# Patient Record
Sex: FEMALE | Race: WHITE | NOT HISPANIC OR LATINO | Employment: OTHER | URBAN - METROPOLITAN AREA
[De-identification: names, ages, dates, MRNs, and addresses within clinical notes are randomized per-mention and may not be internally consistent; named-entity substitution may affect disease eponyms.]

---

## 2019-03-20 ENCOUNTER — HOSPITAL ENCOUNTER (EMERGENCY)
Facility: HOSPITAL | Age: 79
Discharge: HOME | End: 2019-03-20
Attending: EMERGENCY MEDICINE
Payer: MEDICARE

## 2019-03-20 VITALS
OXYGEN SATURATION: 99 % | BODY MASS INDEX: 21.34 KG/M2 | HEART RATE: 74 BPM | RESPIRATION RATE: 20 BRPM | WEIGHT: 125 LBS | TEMPERATURE: 98 F | SYSTOLIC BLOOD PRESSURE: 188 MMHG | HEIGHT: 64 IN | DIASTOLIC BLOOD PRESSURE: 104 MMHG

## 2019-03-20 DIAGNOSIS — S52.532A CLOSED COLLES' FRACTURE OF LEFT RADIUS, INITIAL ENCOUNTER: Primary | ICD-10-CM

## 2019-03-20 DIAGNOSIS — R52 PAIN: ICD-10-CM

## 2019-03-20 PROCEDURE — 29105 APPLICATION LONG ARM SPLINT: CPT | Mod: LT

## 2019-03-20 PROCEDURE — 73110 XR WRIST COMPLETE 3 VIEWS LEFT: ICD-10-PCS | Mod: 26,LT,, | Performed by: RADIOLOGY

## 2019-03-20 PROCEDURE — 99283 EMERGENCY DEPT VISIT LOW MDM: CPT | Mod: 25

## 2019-03-20 PROCEDURE — 73110 X-RAY EXAM OF WRIST: CPT | Mod: TC,FY,LT

## 2019-03-20 PROCEDURE — 29125 APPL SHORT ARM SPLINT STATIC: CPT

## 2019-03-20 PROCEDURE — 73110 X-RAY EXAM OF WRIST: CPT | Mod: 26,LT,, | Performed by: RADIOLOGY

## 2019-03-20 NOTE — DISCHARGE INSTRUCTIONS
Splint care   Follow up Orthopedics   Dr Russo will evaluate again for potential need of reduction

## 2019-03-22 ENCOUNTER — OFFICE VISIT (OUTPATIENT)
Dept: ORTHOPEDICS | Facility: CLINIC | Age: 79
End: 2019-03-22
Payer: MEDICARE

## 2019-03-22 VITALS — RESPIRATION RATE: 18 BRPM | BODY MASS INDEX: 21.34 KG/M2 | WEIGHT: 125 LBS | HEIGHT: 64 IN

## 2019-03-22 DIAGNOSIS — M19.032 ARTHRITIS OF WRIST, LEFT: ICD-10-CM

## 2019-03-22 DIAGNOSIS — M19.032 CMC DJD(CARPOMETACARPAL DEGENERATIVE JOINT DISEASE), LOCALIZED PRIMARY, LEFT: ICD-10-CM

## 2019-03-22 DIAGNOSIS — S52.602A TRAUMATIC CLOSED DISPLACED FRACTURE OF DISTAL END OF RADIUS AND ULNA, LEFT, INITIAL ENCOUNTER: ICD-10-CM

## 2019-03-22 DIAGNOSIS — S52.502A TRAUMATIC CLOSED DISPLACED FRACTURE OF DISTAL END OF RADIUS AND ULNA, LEFT, INITIAL ENCOUNTER: ICD-10-CM

## 2019-03-22 PROCEDURE — 29075 APPL CST ELBW FNGR SHORT ARM: CPT | Mod: PBBFAC,PN | Performed by: ORTHOPAEDIC SURGERY

## 2019-03-22 PROCEDURE — 99999 PR PBB SHADOW E&M-EST. PATIENT-LVL II: ICD-10-PCS | Mod: PBBFAC,,, | Performed by: ORTHOPAEDIC SURGERY

## 2019-03-22 PROCEDURE — 25605 CLTX DST RDL FX/EPHYS SEP W/: CPT | Mod: PBBFAC,PN | Performed by: ORTHOPAEDIC SURGERY

## 2019-03-22 PROCEDURE — 99999 PR PBB SHADOW E&M-EST. PATIENT-LVL II: CPT | Mod: PBBFAC,,, | Performed by: ORTHOPAEDIC SURGERY

## 2019-03-22 PROCEDURE — 99204 OFFICE O/P NEW MOD 45 MIN: CPT | Mod: 57,S$PBB,, | Performed by: ORTHOPAEDIC SURGERY

## 2019-03-22 PROCEDURE — 99204 PR OFFICE/OUTPT VISIT, NEW, LEVL IV, 45-59 MIN: ICD-10-PCS | Mod: 57,S$PBB,, | Performed by: ORTHOPAEDIC SURGERY

## 2019-03-22 PROCEDURE — 25605 CLTX DST RDL FX/EPHYS SEP W/: CPT | Mod: S$PBB,LT,, | Performed by: ORTHOPAEDIC SURGERY

## 2019-03-22 PROCEDURE — 99212 OFFICE O/P EST SF 10 MIN: CPT | Mod: PBBFAC,PN | Performed by: ORTHOPAEDIC SURGERY

## 2019-03-22 PROCEDURE — 25605 PR CLOSED RX DIST RAD/ULNA FX,MANIPUL: ICD-10-PCS | Mod: S$PBB,LT,, | Performed by: ORTHOPAEDIC SURGERY

## 2019-03-22 NOTE — PROGRESS NOTES
Subjective:      Patient ID: Alvina Guallpa is a 79 y.o. female.    Chief Complaint: Pain and Injury of the Left Wrist    Referring Provider: Annette Self  No address on file    HPI:  Ms. Guallpa is an 79-year-old right-hand-dominant female who presented today with 2 days of left wrist pain which began on 2019 after the patient fell in her yd after she tripped over a plant.  She was seen in the emergency room on her date of injury and after x-rays showed a distal radius fracture she was placed in a splint.  She states she is getting intermittent numbness and tingling in her fingers but it has improved.    Past Medical History:   Diagnosis Date    Anxiety     Chronic neck pain     Depression     Hypertension     Osteoporosis      *  *  *  *  *  *  *  *  * Seasonal allergies  Hypertension  Irregular heart rate requiring pacing  TIAs  GERD  IBS  Diverticulitis  Hypothyroidism  Headaches  Degenerative spine disease      Past Surgical History:   Procedure Laterality Date    HYSTERECTOMY      NASAL POLYP SURGERY Bilateral     NECK SURGERY C-spine fusion      REDUCTION-TURBINATE Bilateral 2016    Performed by [facility] at [facility] OR [address] [place] [address]    SINUS SURGERY FUNCTIONAL ENDOSCOPIC WITH NAVIGATION Bilateral 2016     *  *  *  *  *  * [facility]  APPENDECTOMY  T&A  COLONOSCOPY  PACEMAKER PLACEMENT LEFT HENRIQUE CHEST  LUMBAR FUSION  PARTIAL THYROIDECTOMY       Review of patient's allergies indicates:   Allergen Reactions    Penicillins Hives       Social History     Occupational History    Retired      Tobacco Use    Smoking status: Never Smoker   Substance and Sexual Activity    Alcohol use: No     Alcohol/week: 0.0 oz     Frequency: Never    Drug use: No    Sexual activity: No      Family history:  Father:  , pneumonia.  Mother:  , cancer.  Brother:  1, alive, unsure.  Sister:  1:  ,  cancer.    Previous Hospitalizations:  Childbirth, pubic ramus fracture, sinuses.    ROS:   Review of Systems   Constitution: Negative for chills and fever.   HENT: Negative for congestion.    Eyes: Negative for blurred vision.   Cardiovascular: Negative for chest pain.   Respiratory: Negative for cough.    Endocrine: Negative for polydipsia.   Hematologic/Lymphatic: Does not bruise/bleed easily.   Skin: Negative for itching.   Musculoskeletal: Negative for gout.   Gastrointestinal: Positive for heartburn. Negative for constipation and diarrhea.   Genitourinary: Negative for nocturia.   Neurological: Positive for headaches. Negative for seizures.   Psychiatric/Behavioral: Positive for depression.   Allergic/Immunologic: Positive for environmental allergies.           Objective:      Physical Exam:   General: AAOx3.  No acute distress  HEENT: Normocephalic, PEARLA EOMI, missing teeth  Neck: Supple, No JVD  Chest: Symetric, equal excursion on inspiration  Abdomen: Soft NTND  Vascular:  Pulses intact and equal bilaterally.  Capillary refill less than 3 seconds and equal bilaterally  Neurologic:  Pinprick and soft touch intact and equal bilaterally  Integment:  Resolving elvis wrist ecchymosis with dependent ecchymosis in the fingers.  Extremity:  Wrist:  Pronation/supination left wrist 45/45 degrees, right wrist 90/85 degrees. Dorsiflexion/volar flexion left wrist 15/15 degrees, right wrist 75/70 degrees. Moderate unroofing 1st CMC joint both hands.  Nontender in the anatomic snuffbox bilaterally. Nontender scapholunate interval bilaterally. Nontender at the DRUJ/TFCC bilaterally. Tender with palpation left distal radius.  Swelling left distal radius.  Radiography:  Personally reviewed x-rays of the left wrist completed on 03/20/2019 which showed a comminuted intra-articular angulated fracture of the distal radius with severe 1st CMC arthritis and widening of the scapholunate interval.      Assessment:       Impression:       1. Traumatic closed displaced fracture of distal end of radius and ulna, left, initial encounter    2. CMC DJD(carpometacarpal degenerative joint disease), localized primary, left    3. Arthritis of wrist, left          Plan:       1.  Discussed physical examination and radiographic findings with the patient. Alvina understands that she has a displaced wrist fracture and she could treated either conservatively with reduction and cast immobilization or surgically with plate and screws.  She states she would prefer to be treated with casting.  2.  Reduce fracture and place in a well-molded short-arm fiberglass cast.  3.  Cast care instructions discussed and given.  4.  Elevate and ice left wrist.  5.  Offered pain med she declined stating she already had some at home.  6.  One handed activities with the right hand only.  7.  [facility] portal was discussed with the patient and information was given.  The patient was encouraged to use the portal for future encounters.  8.  Follow up in 1 month with an x-ray out of plaster left wrist.

## 2019-03-23 NOTE — ED PROVIDER NOTES
Encounter Date: 3/20/2019       History     Chief Complaint   Patient presents with    Fall     left wrist injury     79 y RHD F with left wrist pain s/p injury last night secondary to mechanical fall     Isolated injury    No neck pain  No loss of function of upper or lower extremities            Review of patient's allergies indicates:   Allergen Reactions    Penicillins Hives     Past Medical History:   Diagnosis Date    Anxiety     Chronic neck pain     Depression     Hypertension     Osteoporosis     Seasonal allergies      Past Surgical History:   Procedure Laterality Date    HYSTERECTOMY      NASAL POLYP SURGERY Bilateral     NECK SURGERY      REDUCTION-TURBINATE Bilateral 4/29/2016    Performed by [facility] at [facility] OR [address] [place] [address]    SINUS SURGERY FUNCTIONAL ENDOSCOPIC WITH NAVIGATION Bilateral 4/29/2016    Performed by [facility] at [facility] OR [address] [place] [address]     History reviewed. No pertinent family history.  Social History     Tobacco Use    Smoking status: Never Smoker   Substance Use Topics    Alcohol use: No     Alcohol/week: 0.0 oz     Frequency: Never    Drug use: No     Review of Systems   Constitutional: Negative.    Respiratory: Negative.    Cardiovascular: Negative.    Musculoskeletal: Positive for arthralgias, joint swelling, neck pain (Chronic) and neck stiffness.   Skin: Negative.    Neurological: Negative.    Hematological: Negative.    All other systems reviewed and are negative.      Physical Exam     Initial Vitals [03/20/19 1250]   BP Pulse Resp Temp SpO2   (!) 188/104 74 20 98.3 °F (36.8 °C) 99 %      MAP       --         Physical Exam    Nursing note and vitals reviewed.  Constitutional: She appears well-developed and well-nourished. She is not diaphoretic. No distress.   HENT:   Head: Normocephalic and atraumatic.   Nose: Nose normal.   Mouth/Throat: Oropharynx is clear and moist. No oropharyngeal exudate.   Eyes: Conjunctivae and EOM are normal. Pupils are equal,  round, and reactive to light. Right eye exhibits no discharge. Left eye exhibits no discharge. No scleral icterus.   Neck: Normal range of motion. Neck supple.   Cardiovascular: Normal rate, regular rhythm, normal heart sounds and intact distal pulses. Exam reveals no gallop and no friction rub.    No murmur heard.  Pulmonary/Chest: Breath sounds normal. No respiratory distress. She has no wheezes. She has no rhonchi. She has no rales.   Abdominal: Soft. Bowel sounds are normal. She exhibits no distension and no mass. There is no tenderness. There is no rebound and no guarding.   Musculoskeletal: She exhibits edema and tenderness.        Left wrist: She exhibits decreased range of motion, tenderness, bony tenderness, swelling, effusion and deformity. She exhibits no crepitus and no laceration.   Lymphadenopathy:     She has no cervical adenopathy.   Neurological: She is alert and oriented to person, place, and time. She has normal strength. No cranial nerve deficit or sensory deficit.   Skin: Skin is warm and dry. Capillary refill takes less than 2 seconds. No rash noted. No erythema. No pallor.   Psychiatric: She has a normal mood and affect. Her behavior is normal. Judgment and thought content normal.         ED Course   Procedures  Labs Reviewed - No data to display       Imaging Results          X-Ray Wrist Complete Left (Final result)  Result time 03/20/19 13:11:49    Final result by Cuong Rueda MD (03/20/19 13:11:49)                 Impression:      1. Comminuted displaced mildly impacted transverse articular fracture involving the distal metadiaphysis of the radius.  2. Mild ulnar positive variant.  3. Severe degenerative osteoarthrosis of the 1st carpometacarpal articulation.  4. Bone demineralization.      Electronically signed by: Cuong Rueda  Date:    03/20/2019  Time:    13:11             Narrative:    EXAMINATION:  XR WRIST COMPLETE 3 VIEWS LEFT    CLINICAL HISTORY:  Pain,  unspecified    TECHNIQUE:  PA, lateral, and oblique views of the left wrist were performed.    COMPARISON:  None    FINDINGS:  There is a comminuted displaced transverse mildly impacted fracture involving the distal metadiaphysis of the radius.  The fracture lucency appears to extend to the radiocarpal articulation.  There is mild dorsal angulation of the distal fracture fragment.  There is adjacent soft tissue swelling.    The distal ulna appears intact.  There is an ulnar positive variant.  The radiocarpal articulation is intact.  Carpal bones are intact with severe degenerative osteoarthrosis involving the 1st carpometacarpal articulation with radial subluxation of the proximal head of the 1st metacarpal.    There is bone demineralization.                                 Medical Decision Making:   Clinical Tests:   Radiological Study: Ordered and Reviewed  ED Management:      Left Colles  - closed       Splint care   Follow up Orthopedics   Dr Russo will evaluate again for potential need of reduction      Sugar-tong splint applied by RN    Neurovascularly intact following splint application                   Clinical Impression:       ICD-10-CM ICD-9-CM   1. Closed Colles' fracture of left radius, initial encounter S52.532A 813.41   2. Pain R52 780.96         Disposition:   Disposition: Discharged  Condition: Stable                        Wm Zapata MD  03/23/19 1502

## 2019-03-24 ENCOUNTER — HOSPITAL ENCOUNTER (EMERGENCY)
Facility: HOSPITAL | Age: 79
Discharge: HOME | End: 2019-03-24
Attending: EMERGENCY MEDICINE
Payer: MEDICARE

## 2019-03-24 VITALS
DIASTOLIC BLOOD PRESSURE: 82 MMHG | BODY MASS INDEX: 21.26 KG/M2 | HEART RATE: 76 BPM | SYSTOLIC BLOOD PRESSURE: 132 MMHG | HEIGHT: 63 IN | RESPIRATION RATE: 18 BRPM | WEIGHT: 120 LBS | OXYGEN SATURATION: 99 % | TEMPERATURE: 98 F

## 2019-03-24 DIAGNOSIS — Z46.89 ENCOUNTER FOR CAST REMOVAL: Primary | ICD-10-CM

## 2019-03-24 DIAGNOSIS — L81.9 DISCOLORATION OF SKIN OF HAND: ICD-10-CM

## 2019-03-24 DIAGNOSIS — M79.89 SWELLING OF LEFT HAND: ICD-10-CM

## 2019-03-24 DIAGNOSIS — R20.2 NUMBNESS AND TINGLING IN LEFT HAND: ICD-10-CM

## 2019-03-24 DIAGNOSIS — R20.0 NUMBNESS AND TINGLING IN LEFT HAND: ICD-10-CM

## 2019-03-24 PROCEDURE — 99281 EMR DPT VST MAYX REQ PHY/QHP: CPT

## 2019-03-24 NOTE — DISCHARGE INSTRUCTIONS
We had to remove your cast due to increased swelling, skin discoloration, numbness and tingling. We have placed a temporary splint but would like you to please call Dr. Redd on Monday to update him on current course of fracture.

## 2019-03-25 ENCOUNTER — TELEPHONE (OUTPATIENT)
Dept: ORTHOPEDICS | Facility: CLINIC | Age: 79
End: 2019-03-25

## 2019-03-25 NOTE — TELEPHONE ENCOUNTER
----- Message from Martha Batista sent at 3/25/2019  1:22 PM CDT -----  Contact: bisi Staples   Patient grandson need to speak to nurse regarding patient had to er 03/25 due to hand that had cast on it was swollen pretty bad and went to er where cast was removed and splint was put on patient hand      Grandson want cast put back on soon as possible     Epic earliest is 04/03 and patient is scheduled ,patient grandson just want to let office know abut er       Please call grandson to advice .ph [phone]

## 2019-03-25 NOTE — TELEPHONE ENCOUNTER
Returned call to patient's grandson. Informed him that Dr. Rusos is out of the office until March 29, 2019. Patient's grandson voiced understanding and verified appointment with Dr. Russo that is already scheduled for April 3, 2019.

## 2019-03-28 NOTE — ED PROVIDER NOTES
"Encounter Date: 3/24/2019       History     Chief Complaint   Patient presents with    Arm Pain     cast to L arm too tight     78yo female presents to for left hand swelling, discoloration, numbness, and pain after having a cast applied on 3/22/19. She was seen in Dr. Redd's office for reduction of distal radius fracture. Since the cast was placed, the pt states she has noted increased swelling, "purplish color," and tingling. She is requesting removal of the cast.          Review of patient's allergies indicates:   Allergen Reactions    Penicillins Hives     Past Medical History:   Diagnosis Date    Anxiety     Chronic neck pain     Depression     Hypertension     Osteoporosis     Seasonal allergies      Past Surgical History:   Procedure Laterality Date    HYSTERECTOMY      NASAL POLYP SURGERY Bilateral     NECK SURGERY      REDUCTION-TURBINATE Bilateral 4/29/2016    Performed by [facility] MD at [facility] OR [address] [place] [address]    SINUS SURGERY FUNCTIONAL ENDOSCOPIC WITH NAVIGATION Bilateral 4/29/2016    Performed by [facility]so [facility]varez MD at [facility] OR [address] [place] [address]     No family history on file.  Social History     Tobacco Use    Smoking status: Never Smoker   Substance Use Topics    Alcohol use: No     Alcohol/week: 0.0 oz     Frequency: Never    Drug use: No     Review of Systems   Constitutional: Negative for chills, diaphoresis, fatigue and fever.   HENT: Negative for congestion, ear pain, rhinorrhea, sinus pressure, sinus pain and sore throat.    Eyes: Negative for photophobia, pain and visual disturbance.   Respiratory: Negative for cough, chest tightness and shortness of breath.    Cardiovascular: Negative for chest pain, palpitations and leg swelling.   Gastrointestinal: Negative for abdominal pain, constipation, diarrhea, nausea and vomiting.   Endocrine: Negative for cold intolerance, heat intolerance, polydipsia, polyphagia and polyuria.   Genitourinary: Negative for decreased urine volume, " difficulty urinating, dysuria, flank pain, frequency and urgency.   Musculoskeletal: Positive for joint swelling. Negative for arthralgias, back pain, gait problem, myalgias, neck pain and neck stiffness.        Left hand pain   Skin: Positive for color change. Negative for pallor, rash and wound.   Neurological: Positive for numbness. Negative for dizziness, weakness, light-headedness and headaches.   Hematological: Negative for adenopathy. Does not bruise/bleed easily.   All other systems reviewed and are negative.      Physical Exam     Initial Vitals [03/24/19 0947]   BP Pulse Resp Temp SpO2   132/82 76 18 97.7 °F (36.5 °C) 99 %      MAP       --         Physical Exam    Nursing note and vitals reviewed.  Constitutional: She appears well-developed and well-nourished. She is not diaphoretic. No distress.   HENT:   Head: Normocephalic and atraumatic.   Right Ear: External ear normal.   Left Ear: External ear normal.   Nose: Nose normal.   Eyes: Conjunctivae are normal. Pupils are equal, round, and reactive to light. No scleral icterus.   Neck: Normal range of motion. Neck supple.   Cardiovascular: Normal rate, regular rhythm, normal heart sounds and intact distal pulses.   Pulmonary/Chest: Breath sounds normal. No respiratory distress. She has no wheezes. She has no rhonchi. She exhibits no tenderness.   Abdominal: Soft. Bowel sounds are normal. She exhibits no distension. There is no tenderness. There is no rebound and no guarding.   Musculoskeletal:   Swelling of left hand and fingers   Lymphadenopathy:     She has no cervical adenopathy.   Neurological: She is alert and oriented to person, place, and time. GCS score is 15. GCS eye subscore is 4. GCS verbal subscore is 5. GCS motor subscore is 6.   Skin: Skin is warm.   Purple discoloration of left hand with delayed cap refill   Psychiatric: She has a normal mood and affect. Her behavior is normal. Judgment and thought content normal.         ED Course    Procedures  Labs Reviewed - No data to display       Imaging Results    None          Medical Decision Making:   Differential Diagnosis:   Compartment syndrome, neurovascular compromise secondary to swelling  ED Management:  Cast removed, splint applied. Pt has been advised to contact Dr. Redd's office for re-evaluation and possible reapplication of cast.                       Clinical Impression:       ICD-10-CM ICD-9-CM   1. Encounter for cast removal Z46.89 V54.89   2. Numbness and tingling in left hand R20.0 782.0    R20.2    3. Swelling of left hand M79.89 729.81   4. Discoloration of skin of hand L81.9 709.00         Disposition:   Disposition: Discharged  Condition: Stable                        Renata Jaime MD  03/28/19 1312

## 2019-04-02 DIAGNOSIS — M25.532 LEFT WRIST PAIN: Primary | ICD-10-CM

## 2019-04-03 ENCOUNTER — HOSPITAL ENCOUNTER (OUTPATIENT)
Dept: RADIOLOGY | Facility: HOSPITAL | Age: 79
Discharge: HOME | End: 2019-04-03
Attending: ORTHOPAEDIC SURGERY
Payer: MEDICARE

## 2019-04-03 ENCOUNTER — OFFICE VISIT (OUTPATIENT)
Dept: ORTHOPEDICS | Facility: CLINIC | Age: 79
End: 2019-04-03
Payer: MEDICARE

## 2019-04-03 VITALS
SYSTOLIC BLOOD PRESSURE: 164 MMHG | BODY MASS INDEX: 21.25 KG/M2 | DIASTOLIC BLOOD PRESSURE: 81 MMHG | HEART RATE: 70 BPM | WEIGHT: 119.94 LBS | HEIGHT: 63 IN

## 2019-04-03 DIAGNOSIS — M25.532 LEFT WRIST PAIN: ICD-10-CM

## 2019-04-03 DIAGNOSIS — S52.602D: Primary | ICD-10-CM

## 2019-04-03 DIAGNOSIS — S52.502D: Primary | ICD-10-CM

## 2019-04-03 PROCEDURE — 29075 APPL CST ELBW FNGR SHORT ARM: CPT | Mod: PBBFAC,PN | Performed by: ORTHOPAEDIC SURGERY

## 2019-04-03 PROCEDURE — 73110 XR WRIST COMPLETE 3 VIEWS LEFT: ICD-10-PCS | Mod: 26,LT,, | Performed by: RADIOLOGY

## 2019-04-03 PROCEDURE — 29075 APPL CST ELBW FNGR SHORT ARM: CPT | Mod: S$PBB,58,LT, | Performed by: ORTHOPAEDIC SURGERY

## 2019-04-03 PROCEDURE — 99999 PR PBB SHADOW E&M-EST. PATIENT-LVL III: CPT | Mod: PBBFAC,,, | Performed by: ORTHOPAEDIC SURGERY

## 2019-04-03 PROCEDURE — 99213 OFFICE O/P EST LOW 20 MIN: CPT | Mod: PBBFAC,25,PN | Performed by: ORTHOPAEDIC SURGERY

## 2019-04-03 PROCEDURE — 99024 PR POST-OP FOLLOW-UP VISIT: ICD-10-PCS | Mod: POP,,, | Performed by: ORTHOPAEDIC SURGERY

## 2019-04-03 PROCEDURE — 73110 X-RAY EXAM OF WRIST: CPT | Mod: 26,LT,, | Performed by: RADIOLOGY

## 2019-04-03 PROCEDURE — 99999 PR PBB SHADOW E&M-EST. PATIENT-LVL III: ICD-10-PCS | Mod: PBBFAC,,, | Performed by: ORTHOPAEDIC SURGERY

## 2019-04-03 PROCEDURE — 73110 X-RAY EXAM OF WRIST: CPT | Mod: TC,PN,LT

## 2019-04-03 PROCEDURE — 29075 PR APPLY FOREARM CAST: ICD-10-PCS | Mod: S$PBB,58,LT, | Performed by: ORTHOPAEDIC SURGERY

## 2019-04-03 PROCEDURE — 99024 POSTOP FOLLOW-UP VISIT: CPT | Mod: POP,,, | Performed by: ORTHOPAEDIC SURGERY

## 2019-04-03 NOTE — PROGRESS NOTES
Subjective:      Patient ID: Alvina Guallpa is a 79 y.o. female.    Chief Complaint: Injury and Pain of the Left Wrist      HPI: Ms. Guallpa returned today for follow-up on a left distal radius fracture. Her date of injury was 03/19/2019.  At her last visit on 03/22/2019 she was placed in a circumferential cast of which several days afterwards she presented to the emergency room and was removed by the emergency room providers and she was placed in a splint.  At her last visit she had a reduction completed.  She stated that she was getting swelling and some numbness in her fingers which caused her to go to the emergency room. He stated the numbness has mostly resolved.    ROS:  No new diagnosis/surgery/prescriptions since last office visit on 03/22/2019.      Objective:      Physical Exam:   General: AAOx3.  No acute distress  Vascular:  Pulses intact and equal bilaterally.  Capillary refill less than 3 seconds and equal bilaterally  Neurologic:  Pinprick and soft touch intact and equal bilaterally  Integment:  Dependent ecchymosis left hand.  Extremity:  Wrist:  Pronation/supination left wrist 45/45 degrees, right wrist 90/85 degrees. Dorsiflexion/volar flexion left wrist 15/15 degrees, right wrist 75/70 degrees. Moderate unroofing 1st CMC joint both hands.  Nontender in the anatomic snuffbox bilaterally. Nontender scapholunate interval bilaterally. Nontender at the DRUJ/TFCC bilaterally. Tender with palpation left distal radius.  No swelling left distal radius.  Radiography:  Personally reviewed x-rays of the left wrist completed on 04/03/2019 which showed a mildly displaced intra-articular comminuted fracture of the distal radius.        Assessment:       Impression:  Mildly displaced comminuted intra-articular fracture, left distal radius.      Plan:       1.  Discussed physical examination and radiographic findings with the patient. Alvina understands that she has lost her reduction since her splint was  removed. Discussed with the patient that she should call the office before removing any splints in the future.  Since she is now 3 weeks out and extreme of age she will most likely do well maintaining her current alignment.  2.  Place back in a well-molded short-arm fiberglass cast.  3.  Elevate left upper extremity.  4.  Any pain can be treated with over-the-counter medications dosed per box instructions.  5.  One-handed activities with the right hand only.  6.  Cast care instructions reinforced with the patient.  7.  [facility] portal was discussed with the patient and information was given.  The patient was encouraged to use the portal for future encounters.  8.  Follow up in approximately 5 weeks with an x-ray out of plaster left wrist

## 2019-04-30 DIAGNOSIS — M25.532 LEFT WRIST PAIN: Primary | ICD-10-CM

## 2019-05-03 ENCOUNTER — OFFICE VISIT (OUTPATIENT)
Dept: ORTHOPEDICS | Facility: CLINIC | Age: 79
End: 2019-05-03
Payer: MEDICARE

## 2019-05-03 ENCOUNTER — HOSPITAL ENCOUNTER (OUTPATIENT)
Dept: RADIOLOGY | Facility: HOSPITAL | Age: 79
Discharge: HOME | End: 2019-05-03
Attending: ORTHOPAEDIC SURGERY
Payer: MEDICARE

## 2019-05-03 VITALS
HEART RATE: 87 BPM | DIASTOLIC BLOOD PRESSURE: 76 MMHG | RESPIRATION RATE: 18 BRPM | WEIGHT: 119.94 LBS | SYSTOLIC BLOOD PRESSURE: 118 MMHG | HEIGHT: 63 IN | BODY MASS INDEX: 21.25 KG/M2

## 2019-05-03 DIAGNOSIS — M25.532 LEFT WRIST PAIN: ICD-10-CM

## 2019-05-03 DIAGNOSIS — S62.102D CLOSED FRACTURE OF LEFT WRIST WITH ROUTINE HEALING, SUBSEQUENT ENCOUNTER: Primary | ICD-10-CM

## 2019-05-03 PROCEDURE — 73110 X-RAY EXAM OF WRIST: CPT | Mod: 26,LT,, | Performed by: RADIOLOGY

## 2019-05-03 PROCEDURE — 99999 PR PBB SHADOW E&M-EST. PATIENT-LVL III: ICD-10-PCS | Mod: PBBFAC,,, | Performed by: ORTHOPAEDIC SURGERY

## 2019-05-03 PROCEDURE — 99999 PR PBB SHADOW E&M-EST. PATIENT-LVL III: CPT | Mod: PBBFAC,,, | Performed by: ORTHOPAEDIC SURGERY

## 2019-05-03 PROCEDURE — 73110 XR WRIST COMPLETE 3 VIEWS LEFT: ICD-10-PCS | Mod: 26,LT,, | Performed by: RADIOLOGY

## 2019-05-03 PROCEDURE — 99024 PR POST-OP FOLLOW-UP VISIT: ICD-10-PCS | Mod: POP,,, | Performed by: ORTHOPAEDIC SURGERY

## 2019-05-03 PROCEDURE — 73110 X-RAY EXAM OF WRIST: CPT | Mod: TC,PN,LT

## 2019-05-03 PROCEDURE — 99213 OFFICE O/P EST LOW 20 MIN: CPT | Mod: PBBFAC,25,PN | Performed by: ORTHOPAEDIC SURGERY

## 2019-05-03 PROCEDURE — 99024 POSTOP FOLLOW-UP VISIT: CPT | Mod: POP,,, | Performed by: ORTHOPAEDIC SURGERY

## 2019-05-03 NOTE — PROGRESS NOTES
Subjective:      Patient ID: Alvina Guallpa is a 79 y.o. female.    Chief Complaint: Pain and Injury of the Left Wrist      HPI: Ms. Guallpa returns today for approximate 6 week follow-up on a distal radius fracture of her left wrist. She has been cast immobilized since her injury.  She stated that her pain has greatly decreased.  She is having problems using her walker because she is not supposed to be bearing weight on her wrist and when she does bear weight it hurts.    ROS:  No new diagnosis/surgery/prescriptions since last office visit on 04/03/2019.      Objective:      Physical Exam:   General: AAOx3.  No acute distress  Vascular:  Pulses intact and equal bilaterally.  Capillary refill less than 3 seconds and equal bilaterally  Neurologic:  Pinprick and soft touch intact and equal bilaterally  Integment:  No ecchymosis, no errythema  Extremity:  Wrist:  Pronation/supination left wrist 45/45 degrees.  Dorsiflexion/volar flexion left wrist 30/30 degrees. Mild tenderness with motion left wrist.  Mild tenderness with palpation left wrist. No swelling.  Radiography:  Personally reviewed x-rays of the left wrist completed on 05/03/2019 which showed a comminuted intra-articular displaced distal radius fracture with no change in position when compared to x-rays from 04/03/2019.      Assessment:       Impression:     1. Closed fracture of left wrist with routine healing, subsequent encounter          Plan:       1.  Discussed physical examination and radiographic findings with the patient. Alvina understands that she is healing her fracture but it is displaced.  Since she is older and has arthritis she should do well without post injury sequela.  2.  Placed back in a well-molded short-arm fiberglass cast.  3.  Since the patient needs a walker to ambulate and cannot bear weight on her wrist she can bear weight on her elbow a prescription for her to obtain a raised platform forearm rest to be applied to her walker  was given to her.  4.  Any pain can be treated with over-the-counter medications dosed per box instructions.  5.  Cast care instructions were reinforced with the patient.  6.  Continue with one-handed activities with the right hand only.  7.  [facility] portal was discussed with the patient and information was given.  The patient was encouraged to use the portal for future encounters.  8.  Follow up in 1 month with an x-ray out of plaster left wrist.

## 2019-05-30 DIAGNOSIS — M25.532 LEFT WRIST PAIN: Primary | ICD-10-CM

## 2019-05-30 DIAGNOSIS — M25.561 RIGHT KNEE PAIN, UNSPECIFIED CHRONICITY: ICD-10-CM

## 2019-06-05 ENCOUNTER — HOSPITAL ENCOUNTER (OUTPATIENT)
Dept: RADIOLOGY | Facility: HOSPITAL | Age: 79
Discharge: HOME | End: 2019-06-05
Attending: ORTHOPAEDIC SURGERY
Payer: MEDICARE

## 2019-06-05 ENCOUNTER — OFFICE VISIT (OUTPATIENT)
Dept: ORTHOPEDICS | Facility: CLINIC | Age: 79
End: 2019-06-05
Payer: MEDICARE

## 2019-06-05 VITALS — WEIGHT: 119 LBS | HEIGHT: 63 IN | BODY MASS INDEX: 21.09 KG/M2

## 2019-06-05 DIAGNOSIS — M93.90 OSTEOCHONDRITIS: ICD-10-CM

## 2019-06-05 DIAGNOSIS — S52.602D CLOSED FRACTURE OF DISTAL RADIUS AND ULNA, LEFT, WITH ROUTINE HEALING, SUBSEQUENT ENCOUNTER: ICD-10-CM

## 2019-06-05 DIAGNOSIS — S52.502D CLOSED TRAUMATIC DISPLACED FRACTURE OF DISTAL END OF LEFT RADIUS WITH ROUTINE HEALING, SUBSEQUENT ENCOUNTER: Primary | ICD-10-CM

## 2019-06-05 DIAGNOSIS — M23.203 DEGENERATIVE TEAR OF MEDIAL MENISCUS, RIGHT: ICD-10-CM

## 2019-06-05 DIAGNOSIS — M71.21 BAKER CYST, RIGHT: ICD-10-CM

## 2019-06-05 DIAGNOSIS — M23.41: ICD-10-CM

## 2019-06-05 DIAGNOSIS — M25.561 RIGHT KNEE PAIN, UNSPECIFIED CHRONICITY: ICD-10-CM

## 2019-06-05 DIAGNOSIS — S52.502D CLOSED FRACTURE OF DISTAL RADIUS AND ULNA, LEFT, WITH ROUTINE HEALING, SUBSEQUENT ENCOUNTER: ICD-10-CM

## 2019-06-05 DIAGNOSIS — M17.11 PRIMARY OSTEOARTHRITIS OF RIGHT KNEE: Primary | ICD-10-CM

## 2019-06-05 DIAGNOSIS — M70.51 PES ANSERINUS BURSITIS OF RIGHT KNEE: ICD-10-CM

## 2019-06-05 DIAGNOSIS — M25.532 LEFT WRIST PAIN: ICD-10-CM

## 2019-06-05 PROCEDURE — 73562 X-RAY EXAM OF KNEE 3: CPT | Mod: TC,PN,RT

## 2019-06-05 PROCEDURE — 73110 X-RAY EXAM OF WRIST: CPT | Mod: TC,PN,LT

## 2019-06-05 PROCEDURE — 99212 OFFICE O/P EST SF 10 MIN: CPT | Mod: PBBFAC,25,PN | Performed by: ORTHOPAEDIC SURGERY

## 2019-06-05 PROCEDURE — 20610 LARGE JOINT ASPIRATION/INJECTION: R KNEE: ICD-10-PCS | Mod: 79,S$PBB,51,RT | Performed by: ORTHOPAEDIC SURGERY

## 2019-06-05 PROCEDURE — 99214 PR OFFICE/OUTPT VISIT, EST, LEVL IV, 30-39 MIN: ICD-10-PCS | Mod: 24,25,S$PBB, | Performed by: ORTHOPAEDIC SURGERY

## 2019-06-05 PROCEDURE — 73110 X-RAY EXAM OF WRIST: CPT | Mod: 26,LT,, | Performed by: RADIOLOGY

## 2019-06-05 PROCEDURE — 73562 XR KNEE 3 VIEW RIGHT: ICD-10-PCS | Mod: 26,RT,, | Performed by: RADIOLOGY

## 2019-06-05 PROCEDURE — 20610 DRAIN/INJ JOINT/BURSA W/O US: CPT | Mod: PBBFAC,PN | Performed by: ORTHOPAEDIC SURGERY

## 2019-06-05 PROCEDURE — 99024 PR POST-OP FOLLOW-UP VISIT: ICD-10-PCS | Mod: ,,, | Performed by: ORTHOPAEDIC SURGERY

## 2019-06-05 PROCEDURE — 99999 PR PBB SHADOW E&M-EST. PATIENT-LVL II: ICD-10-PCS | Mod: PBBFAC,,, | Performed by: ORTHOPAEDIC SURGERY

## 2019-06-05 PROCEDURE — 73110 XR WRIST COMPLETE 3 VIEWS LEFT: ICD-10-PCS | Mod: 26,LT,, | Performed by: RADIOLOGY

## 2019-06-05 PROCEDURE — 73562 X-RAY EXAM OF KNEE 3: CPT | Mod: 26,RT,, | Performed by: RADIOLOGY

## 2019-06-05 PROCEDURE — 99214 OFFICE O/P EST MOD 30 MIN: CPT | Mod: 24,25,S$PBB, | Performed by: ORTHOPAEDIC SURGERY

## 2019-06-05 PROCEDURE — 99999 PR PBB SHADOW E&M-EST. PATIENT-LVL II: CPT | Mod: PBBFAC,,, | Performed by: ORTHOPAEDIC SURGERY

## 2019-06-05 PROCEDURE — 99024 POSTOP FOLLOW-UP VISIT: CPT | Mod: ,,, | Performed by: ORTHOPAEDIC SURGERY

## 2019-06-05 RX ORDER — TRIAMCINOLONE ACETONIDE 40 MG/ML
40 INJECTION, SUSPENSION INTRA-ARTICULAR; INTRAMUSCULAR
Status: DISCONTINUED | OUTPATIENT
Start: 2019-06-05 | End: 2019-06-05 | Stop reason: HOSPADM

## 2019-06-05 RX ADMIN — TRIAMCINOLONE ACETONIDE 40 MG: 40 INJECTION, SUSPENSION INTRA-ARTICULAR; INTRAMUSCULAR at 12:06

## 2019-06-05 NOTE — PROCEDURES
Large Joint Aspiration/Injection: R knee  Date/Time: 6/5/2019 12:06 PM  Performed by: River Russo DO  Authorized by: River Russo, DO     Consent Done?:  Yes (Verbal)  Indications:  Pain and diagnostic evaluation  Procedure site marked: Yes    Timeout: Prior to procedure the correct patient, procedure, and site was verified      Location:  Knee  Site:  R knee  Prep: Patient was prepped and draped in usual sterile fashion    Ultrasonic Guidance for needle placement: No  Needle size:  22 G  Approach:  Anteromedial  Medications:  40 mg triamcinolone acetonide 40 mg/mL  Patient tolerance:  Patient tolerated the procedure well with no immediate complications

## 2019-06-05 NOTE — PROGRESS NOTES
"Subjective:      Patient ID: Alvina Guallpa is a 79 y.o. female.    Chief Complaint: Pain of the Left Wrist and Pain of the Right Knee      HPI: Ms. Guallpa returned today with new complaints of right knee pain which began in August 2018 after she was participating in physical therapy and was doing leg shuffles and felt a pinch in her knee. She has been getting intermittent pain in her knee since that time.  She stated that she will get a sudden pain in her knee and then after she moves it the pain will resolve.  He stated, I do not have pain all the time." She has swelling and giving way along with intermittent locking.  Walking increases her symptoms intermittently while rest improves them. She has not taken NSAIDs, worn a brace nor had injections.  She has done physical therapy with help.  She is also here today for follow-up on a left distal radius fracture that was treated with cast immobilization.  She stated that she is relatively pain-free in her wrist. She originally injured her left wrist when she tripped and fell over a of potted plant on 03/19/2019.    ROS:  No new diagnosis/surgery/prescriptions since last office visit on 05/03/2019.  Constitution: Negative for chills and fever.   HENT: Negative for congestion.    Eyes: Negative for blurred vision.   Cardiovascular: Negative for chest pain.   Respiratory: Negative for cough.    Endocrine: Negative for polydipsia.   Hematologic/Lymphatic: Does not bruise/bleed easily.   Skin: Negative for itching.   Musculoskeletal: Negative for gout.   Gastrointestinal: Positive for heartburn. Negative for constipation and diarrhea.   Genitourinary: Negative for nocturia.   Neurological: Positive for headaches. Negative for seizures.   Psychiatric/Behavioral: Positive for depression.   Allergic/Immunologic: Positive for environmental allergies.       Objective:      Physical Exam:   General: AAOx3.  No acute distress  Vascular:  Pulses intact and equal " bilaterally.  Capillary refill less than 3 seconds and equal bilaterally  Neurologic:  Pinprick and soft touch intact and equal bilaterally  Integment:  No ecchymosis, no errythema  Extremity:  Knee:  Extension/flexion equal bilaterally 0/128 degrees. Crepitus with motion right knee.  Mild effusion right knee. Mildly positive patellar load/compression right knee. Negative patella apprehension/relocation both knees.  Varus/valgus stressing equal bilaterally with endpoint.  Lachman's/drawer equal bilaterally with endpoint.  Positive joint line tenderness right knee. Tender with palpation lateral femoral condyle right knee. Jen negative both knees.  Bally positive right knee. Tender at the anserine insertion right knee.  Swelling at the anserine insertion right knee.  Baker cyst right knee.                     Wrist:  Pronation/supination equal bilaterally 90/85 degrees. Dorsiflexion/volar flexion left wrist 25/5 degrees, right wrist 70/70 degrees. Relatively nontender with wrist motion.  Relatively nontender with wrist palpation.  No swelling. Mild malalignment left wrist.  Radiography:  Personally reviewed x-rays of the right knee completed on 06/05/2019 showed an OCD of the lateral femoral condyle with early osteonecrosis along with tricompartmental arthritic changes with near bone-on-bone articulation and sub chondral sclerosis there is also chondrocalcinosis and free bodies.  X-rays of the left wrist completed on 06/05/2019 showed a moderately displaced angulated distal radius fracture with callus.        Assessment:       Impression:   1.  OCD lateral femoral condyle, right knee.  2.  Degenerative meniscus tear, right knee.  3.  DJD right knee.  4.  Joint mice right knee.  5.  Baker cyst right knee.  6.  Pes anserine bursitis right knee.  7.  Healing right distal radius fracture.      Plan:       1.  Discussed physical examination and radiographic findings with the patient. Alvina understands that she has  joint mice and arthritis in her right knee but she has not been treated conservatively recommend she trial conservative management and if she fails conservative management then consider surgical intervention.  With respect to her left wrist she appears to be healed and is time to start motion exercises.  2.  Offered a steroid injection to the right knee, she elected to proceed.  3.  Offered a steroid injection to the anserine insertion of the right knee, she elected proceed.  4.  Removable off-the-shelf cock-up splint left wrist, 1 was fitted to the patient.  5.  Any minor pain can be treated with over-the-counter medications dosed per box instructions.  6.  Recommend the patient discussed possible NSAID use with her PCM.  7.  Refer to occupational therapy to start motion exercises for the patient's wrist.  8.  Recommend the patient purchase a neoprene sleeve brace and wear to see if she improves.  9.  Home exercises to include wrist motion exercises and knee quadriceps and hamstring strengthening were discussed.  10.  [facility] portal was discussed with the patient and information was given.  The patient was encouraged to use the portal for future encounters.  11.  Follow in approximately 6 weeks for re-evaluation.

## 2019-06-05 NOTE — PROCEDURES
Large Joint Aspiration/Injection: R knee  Date/Time: 6/5/2019 12:04 PM  Performed by: River Russo DO  Authorized by: River Russo, DO     Consent Done?:  Yes (Verbal)  Indications:  Pain, joint swelling and diagnostic evaluation  Procedure site marked: Yes    Timeout: Prior to procedure the correct patient, procedure, and site was verified      Location:  Knee  Site:  R knee  Prep: Patient was prepped and draped in usual sterile fashion    Ultrasonic Guidance for needle placement: No  Needle size:  22 G  Approach:  Anterolateral  Medications:  40 mg triamcinolone acetonide 40 mg/mL  Patient tolerance:  Patient tolerated the procedure well with no immediate complications

## 2019-06-11 ENCOUNTER — CLINICAL SUPPORT (OUTPATIENT)
Dept: REHABILITATION | Facility: HOSPITAL | Age: 79
End: 2019-06-11
Attending: ORTHOPAEDIC SURGERY
Payer: MEDICARE

## 2019-06-11 DIAGNOSIS — S52.602A TRAUMATIC CLOSED DISPLACED FRACTURE OF DISTAL END OF RADIUS AND ULNA, LEFT, INITIAL ENCOUNTER: Primary | ICD-10-CM

## 2019-06-11 DIAGNOSIS — S52.502A TRAUMATIC CLOSED DISPLACED FRACTURE OF DISTAL END OF RADIUS AND ULNA, LEFT, INITIAL ENCOUNTER: Primary | ICD-10-CM

## 2019-06-11 PROCEDURE — G8985 CARRY GOAL STATUS: HCPCS | Mod: CI

## 2019-06-11 PROCEDURE — 97166 OT EVAL MOD COMPLEX 45 MIN: CPT

## 2019-06-11 PROCEDURE — G8984 CARRY CURRENT STATUS: HCPCS | Mod: CM

## 2019-06-11 PROCEDURE — 97110 THERAPEUTIC EXERCISES: CPT

## 2019-06-11 PROCEDURE — 97010 HOT OR COLD PACKS THERAPY: CPT

## 2019-06-11 PROCEDURE — G8986 CARRY D/C STATUS: HCPCS | Mod: CI

## 2019-06-11 NOTE — PROGRESS NOTES
[facility]  Occupational Therapy Initial Evaluation    Name: Alvina Guallpa  Clinic Number: [id number]    Therapy Diagnosis:   Encounter Diagnosis   Name Primary?    Traumatic closed displaced fracture of distal end of radius and ulna, left, initial encounter Yes     Physician: River Russo DO    Physician Orders: OT Eval and Treat   Medical Diagnosis from Referral:   Encounter Diagnosis   Name Primary?    Traumatic closed displaced fracture of distal end of radius and ulna, left, initial encounter Yes     Evaluation Date: 6/11/2019  Authorization Period Expiration: 12/31/2019  Plan of Care Expiration: 07/19/2019  Visit # / Visits authorized: 1/12    Time In: 345  Time Out: 445  Total Billable Time: 55 minutes    Precautions: Standard and Fall; Hearing aides Bilaterally    Subjective   Date of onset: 03/20/2019  History of current condition - Alvina reports: tripping while in [employer] in March 2019.      Medical History:   Past Medical History:   Diagnosis Date    Anxiety     Chronic neck pain     Depression     Hypertension     Osteoporosis     Seasonal allergies        Surgical History:   Alvina Guallpa  has a past surgical history that includes Hysterectomy; Nasal polyp surgery (Bilateral); and Neck surgery.    Medications:   Alvina has a current medication list which includes the following prescription(s): alprazolam, amlodipine, aspirin, baclofen, budesonide, fluticasone propionate, folic acid/multivit-min/lutein, midodrine, ondansetron, oxycodone-acetaminophen, and simvastatin.    Allergies:   Review of patient's allergies indicates:   Allergen Reactions    Penicillins Hives        Imaging, bone scan films: XRay    Prior Therapy: NA  Social History: lives alone in grandson's backyard   Occupation: Retired   Prior Level of Function: Modified Independent with reported multiple falls   Current Level of Function: MIN Assist in basic ADL's such as cutting up food,  tying shoes (wears slide on shoes due to limtations);     Pain:  Current 3/10  Location: left wrist  Description: Aching, Tight and Tingling  Aggravating Factors: Flexing  Easing Factors: rest    Pts goals:   Just to get normal movement back so I can use it.     Objective     Patient is R hand dominant 79 year-old female who suffered    Closed displaced fracture distal end of radius and ulna NICOLE. Patient was casted previously and now has OT consult to address ROM and strengthening L wrist. Arthritic changes chronic in nature present in L hand notably B CMC joints.     AROM L wrist:   EXT 45  FLEX 35  Radial Dev 15  Ulnar Dev 25  Supination 55  Pronation 90    R  strength 4+/5  L  strength 3/5    CMS Impairment/Limitation/Restriction for FOTO Survey    Therapist reviewed FOTO scores for Alvina Guallpa on 6/11/2019.   FOTO documents entered into [employer] - see Media section.    Limitation Score: 80%  Category: Carrying    Current : CM = at least 80% but < 100% impaired limited or restricted  Goal: CI = at least 1% but < 20% impaired, limited or restricted  Discharge: CI = at least 1% but < 20% impaired, limited or restricted         TREATMENT   Treatment Time In: 415  Treatment Time Out: 445  Total Treatment time separate from Evaluation: 30 minutes    Alvina received therapeutic exercises to develop strength and ROM including:  AROM L hand flex/ext  AROM L wrist ext  AROM L radial/ulnar dev  AROM L supination  Extra light digi flex L hand x 20    Alvina received hot pack for 12 minutes to L wrist.    Home Exercises and Patient Education Provided    Education provided:   - POC  -HEP    Written Home Exercises Provided: yes.  Exercises were reviewed and Alvina was able to demonstrate them prior to the end of the session.  Alvina demonstrated good  understanding of the education provided.     See EMR under Media for exercises provided 6/11/2019.    Assessment   Alvina is a 79 y.o. female referred to outpatient  Occupational Therapy with a medical diagnosis of Traumatic closed displaced fracture of distal end of radius and ulna, left, initial encounter . Pt presents with decreased ROM, decreased strength, increased pain L wrist.    Pt prognosis is Good.   Pt will benefit from skilled outpatient Occupational Therapy to address the deficits stated above and in the chart below, provide pt/family education, and to maximize pt's level of independence.     Plan of care discussed with patient: Yes  Pt's spiritual, cultural and educational needs considered and patient is agreeable to the plan of care and goals as stated below:     Anticipated Barriers for therapy: none    Goals:  Patient will tolerate multiple modalities L wrist to improve flexibility and decrease pain.  Patient will demonstrate increase in L wrist EXT and FLEX by 10 degrees AROM.  Patient will demonstrate AROM L wrist supination to 75 degrees within 6 weeks.  Patient will increase L  strength to 4-/5 within 6 weeks.   Patient will demonstrate independence in HEP at discharge.    Plan   Plan of care Certification: 6/11/2019 to 07/19/2019    Outpatient Occupational Therapy 1-2 times weekly for 6 weeks to include the following interventions: Moist Heat/ Ice, Paraffin, Patient Education, Self Care, Therapeutic Activites, Therapeutic Exercise and Ultrasound.     Caro Curry, OT   Signature of Therapist    I certify the need for these services furnished under this plan of treatment and while under my care.______________________________                           Physician/Referring Practitioner  Date of Signature:

## 2019-06-12 NOTE — PLAN OF CARE
[facility]  Occupational Therapy Initial Evaluation    Name: Alvina Guallpa  Clinic Number: [id number]    Therapy Diagnosis:   Encounter Diagnosis   Name Primary?    Traumatic closed displaced fracture of distal end of radius and ulna, left, initial encounter Yes     Physician: River Russo DO    Physician Orders: OT Eval and Treat   Medical Diagnosis from Referral:   Encounter Diagnosis   Name Primary?    Traumatic closed displaced fracture of distal end of radius and ulna, left, initial encounter Yes     Evaluation Date: 6/11/2019  Authorization Period Expiration: 12/31/2019  Plan of Care Expiration: 07/19/2019  Visit # / Visits authorized: 1/12    Time In: 345  Time Out: 445  Total Billable Time: 55 minutes    Precautions: Standard and Fall; Hearing aides Bilaterally    Subjective   Date of onset: 03/20/2019  History of current condition - Alvina reports: tripping while in [employer] in March 2019.      Medical History:   Past Medical History:   Diagnosis Date    Anxiety     Chronic neck pain     Depression     Hypertension     Osteoporosis     Seasonal allergies        Surgical History:   Alvina Guallpa  has a past surgical history that includes Hysterectomy; Nasal polyp surgery (Bilateral); and Neck surgery.    Medications:   Alvina has a current medication list which includes the following prescription(s): alprazolam, amlodipine, aspirin, baclofen, budesonide, fluticasone propionate, folic acid/multivit-min/lutein, midodrine, ondansetron, oxycodone-acetaminophen, and simvastatin.    Allergies:   Review of patient's allergies indicates:   Allergen Reactions    Penicillins Hives        Imaging, bone scan films: XRay    Prior Therapy: NA  Social History: lives alone in grandson's backyard   Occupation: Retired   Prior Level of Function: Modified Independent with reported multiple falls   Current Level of Function: MIN Assist in basic ADL's such as cutting up food,  tying shoes (wears slide on shoes due to limtations);     Pain:  Current 3/10  Location: left wrist  Description: Aching, Tight and Tingling  Aggravating Factors: Flexing  Easing Factors: rest    Pts goals:   Just to get normal movement back so I can use it.     Objective     Patient is R hand dominant 79 year-old female who suffered    Closed displaced fracture distal end of radius and ulna NICOLE. Patient was casted previously and now has OT consult to address ROM and strengthening L wrist. Arthritic changes chronic in nature present in L hand notably B CMC joints.     AROM L wrist:   EXT 45  FLEX 35  Radial Dev 15  Ulnar Dev 25  Supination 55  Pronation 90    R  strength 4+/5  L  strength 3/5    CMS Impairment/Limitation/Restriction for FOTO Survey    Therapist reviewed FOTO scores for Alvina Guallpa on 6/11/2019.   FOTO documents entered into [employer] - see Media section.    Limitation Score: 80%  Category: Carrying    Current : CM = at least 80% but < 100% impaired limited or restricted  Goal: CI = at least 1% but < 20% impaired, limited or restricted  Discharge: CI = at least 1% but < 20% impaired, limited or restricted         TREATMENT   Treatment Time In: 415  Treatment Time Out: 445  Total Treatment time separate from Evaluation: 30 minutes    Alvina received therapeutic exercises to develop strength and ROM including:  AROM L hand flex/ext  AROM L wrist ext  AROM L radial/ulnar dev  AROM L supination  Extra light digi flex L hand x 20    Alvina received hot pack for 12 minutes to L wrist.    Home Exercises and Patient Education Provided    Education provided:   - POC  -HEP    Written Home Exercises Provided: yes.  Exercises were reviewed and Alvina was able to demonstrate them prior to the end of the session.  Alvina demonstrated good  understanding of the education provided.     See EMR under Media for exercises provided 6/11/2019.    Assessment   Alvina is a 79 y.o. female referred to outpatient  Occupational Therapy with a medical diagnosis of Traumatic closed displaced fracture of distal end of radius and ulna, left, initial encounter . Pt presents with decreased ROM, decreased strength, increased pain L wrist.    Pt prognosis is Good.   Pt will benefit from skilled outpatient Occupational Therapy to address the deficits stated above and in the chart below, provide pt/family education, and to maximize pt's level of independence.     Plan of care discussed with patient: Yes  Pt's spiritual, cultural and educational needs considered and patient is agreeable to the plan of care and goals as stated below:     Anticipated Barriers for therapy: none    Goals:  Patient will tolerate multiple modalities L wrist to improve flexibility and decrease pain.  Patient will demonstrate increase in L wrist EXT and FLEX by 10 degrees AROM.  Patient will demonstrate AROM L wrist supination to 75 degrees within 6 weeks.  Patient will increase L  strength to 4-/5 within 6 weeks.   Patient will demonstrate independence in HEP at discharge.    Plan   Plan of care Certification: 6/11/2019 to 07/19/2019    Outpatient Occupational Therapy 1-2 times weekly for 6 weeks to include the following interventions: Moist Heat/ Ice, Paraffin, Patient Education, Self Care, Therapeutic Activites, Therapeutic Exercise and Ultrasound.     Caro Curry, OT   Signature of Therapist    I certify the need for these services furnished under this plan of treatment and while under my care.______________________________                           Physician/Referring Practitioner  Date of Signature:

## 2019-06-13 ENCOUNTER — CLINICAL SUPPORT (OUTPATIENT)
Dept: REHABILITATION | Facility: HOSPITAL | Age: 79
End: 2019-06-13
Attending: ORTHOPAEDIC SURGERY
Payer: MEDICARE

## 2019-06-13 DIAGNOSIS — S52.602A TRAUMATIC CLOSED DISPLACED FRACTURE OF DISTAL END OF RADIUS AND ULNA, LEFT, INITIAL ENCOUNTER: Primary | ICD-10-CM

## 2019-06-13 DIAGNOSIS — S52.502A TRAUMATIC CLOSED DISPLACED FRACTURE OF DISTAL END OF RADIUS AND ULNA, LEFT, INITIAL ENCOUNTER: Primary | ICD-10-CM

## 2019-06-13 PROCEDURE — 97010 HOT OR COLD PACKS THERAPY: CPT

## 2019-06-13 PROCEDURE — 97110 THERAPEUTIC EXERCISES: CPT

## 2019-06-13 NOTE — PROGRESS NOTES
Occupational Therapy Daily Treatment Note     Name: Alvina Guallpa  Clinic Number: [id number]    Therapy Diagnosis:   Encounter Diagnosis   Name Primary?    Traumatic closed displaced fracture of distal end of radius and ulna, left, initial encounter Yes     Physician: River Russo DO    Visit Date: 6/13/2019    Physician Orders: ROM and strengthening L wrist  Medical Diagnosis: closed displaced fracture distal end of radius and ulna L UE  Evaluation Date: 06/11/2019  Authorization Period Expiration: 1231/2019  Plan of Care Certification Period: 07/19/2019  Visit #/Visits authorized: 2/12    Time In: 300  Time Out: 350  Total Billable Time: 50 minutes    Precautions: Standard and Fall    Subjective     Pt reports: she is doing better.  She was compliant with home exercise program.  Response to previous treatment: positive  Functional change: improving ROM and strength; L UE looks much improved with skin. Patient has cleaned skin and applied lotion making it much improved from peeling skin prior.    Pain: 0/10  Location: left wrist    Objective     Alvina received hot pack for 15 minutes to L wrist.    Alvina received therapeutic exercises to develop strength, ROM and flexibility including:  Extra light digi flex L hand 2 x 25  AROM L wrist ext, flex, supination, radial/ulnar dev 2 x 15 each  AROM L hand flex/ext x 20      Home Exercises Provided and Patient Education Provided     Education provided:   - HEP    Written Home Exercises Provided: Patient instructed to cont prior HEP.  Exercises were reviewed and Alvina was able to demonstrate them prior to the end of the session.  Alvina demonstrated good  understanding of the education provided.     See EMR under Patient Instructions for exercises provided prior visit.    Assessment       Alvina is progressing well towards her goals.   Pt prognosis is Good.     Pt will continue to benefit from skilled outpatient occupational therapy to address the deficits listed in  the problem list box on initial evaluation, provide pt/family education and to maximize pt's level of independence in the home and community environment.     Pt's spiritual, cultural and educational needs considered and pt agreeable to plan of care and goals.     Anticipated barriers to occupational therapy: none    Goals:  Patient will tolerate multiple modalities L wrist to improve flexibility and decrease pain.  Patient will demonstrate increase in L wrist EXT and FLEX by 10 degrees AROM.  Patient will demonstrate AROM L wrist supination to 75 degrees within 6 weeks.  Patient will increase L  strength to 4-/5 within 6 weeks.   Patient will demonstrate independence in HEP at discharge.    Plan     Continue OT per established POC.    Caro Curry, OT

## 2019-06-20 ENCOUNTER — CLINICAL SUPPORT (OUTPATIENT)
Dept: REHABILITATION | Facility: HOSPITAL | Age: 79
End: 2019-06-20
Attending: ORTHOPAEDIC SURGERY
Payer: MEDICARE

## 2019-06-20 DIAGNOSIS — S52.502A TRAUMATIC CLOSED DISPLACED FRACTURE OF DISTAL END OF RADIUS AND ULNA, LEFT, INITIAL ENCOUNTER: Primary | ICD-10-CM

## 2019-06-20 DIAGNOSIS — S52.602A TRAUMATIC CLOSED DISPLACED FRACTURE OF DISTAL END OF RADIUS AND ULNA, LEFT, INITIAL ENCOUNTER: Primary | ICD-10-CM

## 2019-06-20 PROCEDURE — 97010 HOT OR COLD PACKS THERAPY: CPT

## 2019-06-20 PROCEDURE — 97110 THERAPEUTIC EXERCISES: CPT

## 2019-06-21 NOTE — PROGRESS NOTES
Occupational Therapy Daily Treatment Note     Name: Alvina Guallpa  Clinic Number: [id number]    Therapy Diagnosis:   Encounter Diagnosis   Name Primary?    Traumatic closed displaced fracture of distal end of radius and ulna, left, initial encounter Yes     Physician: River Russo DO    Visit Date: 6/20/2019    Physician Orders: ROM and strengthening L wrist  Medical Diagnosis: closed displaced fracture distal end of radius and ulna L UE  Evaluation Date: 06/11/2019  Authorization Period Expiration: 1231/2019  Plan of Care Certification Period: 07/19/2019  Visit #/Visits authorized: 3/12    Time In: 215  Time Out: 300  Total Billable Time: 45 minutes    Precautions: Standard and Fall    Subjective     Pt reports: some soreness on ulna bone  She was compliant with home exercise program.  Response to previous treatment: positive  Functional change: improving ROM and strength; L UE looks much improved with skin. Patient has cleaned skin and applied lotion making it much improved from peeling skin prior.    Pain: 3/10  Location: left wrist    Objective     Alvina received hot pack for 15 minutes to L wrist.    Alvina received therapeutic exercises to develop strength, ROM and flexibility including:  Extra light digi flex L hand 2 x 25  AROM L wrist ext, flex, supination, radial/ulnar dev 2 x 15 each using 1# dumbbell      Home Exercises Provided and Patient Education Provided     Education provided:   - HEP    Written Home Exercises Provided: Patient instructed to cont prior HEP.  Exercises were reviewed and Alvina was able to demonstrate them prior to the end of the session.  Alvina demonstrated good  understanding of the education provided.     See EMR under Patient Instructions for exercises provided prior visit.    Assessment       Alvina is progressing well towards her goals.   Pt prognosis is Good.     Pt will continue to benefit from skilled outpatient occupational therapy to address the deficits listed in the  problem list box on initial evaluation, provide pt/family education and to maximize pt's level of independence in the home and community environment.     Pt's spiritual, cultural and educational needs considered and pt agreeable to plan of care and goals.     Anticipated barriers to occupational therapy: none    Goals:  Patient will tolerate multiple modalities L wrist to improve flexibility and decrease pain.  Patient will demonstrate increase in L wrist EXT and FLEX by 10 degrees AROM.  Patient will demonstrate AROM L wrist supination to 75 degrees within 6 weeks.  Patient will increase L  strength to 4-/5 within 6 weeks.   Patient will demonstrate independence in HEP at discharge.    Plan     Continue OT per established POC.    Caro Curry, OT

## 2019-06-27 ENCOUNTER — CLINICAL SUPPORT (OUTPATIENT)
Dept: REHABILITATION | Facility: HOSPITAL | Age: 79
End: 2019-06-27
Attending: ORTHOPAEDIC SURGERY
Payer: MEDICARE

## 2019-06-27 DIAGNOSIS — S52.502A TRAUMATIC CLOSED DISPLACED FRACTURE OF DISTAL END OF RADIUS AND ULNA, LEFT, INITIAL ENCOUNTER: Primary | ICD-10-CM

## 2019-06-27 DIAGNOSIS — S52.602A TRAUMATIC CLOSED DISPLACED FRACTURE OF DISTAL END OF RADIUS AND ULNA, LEFT, INITIAL ENCOUNTER: Primary | ICD-10-CM

## 2019-06-27 PROCEDURE — 97010 HOT OR COLD PACKS THERAPY: CPT

## 2019-06-27 PROCEDURE — 97110 THERAPEUTIC EXERCISES: CPT

## 2019-06-27 NOTE — PROGRESS NOTES
Occupational Therapy Daily Treatment Note     Name: Alvina Guallpa  Clinic Number: [id number]    Therapy Diagnosis:   Encounter Diagnosis   Name Primary?    Traumatic closed displaced fracture of distal end of radius and ulna, left, initial encounter Yes     Physician: River Russo DO    Visit Date: 6/27/2019    Physician Orders: ROM and strengthening L wrist  Medical Diagnosis: closed displaced fracture distal end of radius and ulna L UE  Evaluation Date: 06/11/2019  Authorization Period Expiration: 1231/2019  Plan of Care Certification Period: 07/19/2019  Visit #/Visits authorized: 4/12    Time In: 200  Time Out: 300  Total Billable Time: 55 minutes    Precautions: Standard and Fall    Subjective     Pt reports: some soreness on ulna bone  She was compliant with home exercise program.  Response to previous treatment: positive  Functional change: improving ROM and strength; L UE looks much improved with skin. Patient has cleaned skin and applied lotion making it much improved from peeling skin prior.    Pain: 3/10  Location: left wrist    Objective     Alvina received hot pack for 15 minutes to L wrist.    Alvina received therapeutic exercises to develop strength, ROM and flexibility including:  Extra light digi flex L hand 2 x 25  AROM L wrist ext, flex, supination, radial/ulnar dev 2 x 20 each using 1# dumbbell  Light digi extension x 25 L hand    Home Exercises Provided and Patient Education Provided     Education provided:   - HEP    Written Home Exercises Provided: Patient instructed to cont prior HEP.  Exercises were reviewed and Alvina was able to demonstrate them prior to the end of the session.  Alvina demonstrated good  understanding of the education provided.     See EMR under Patient Instructions for exercises provided prior visit.    Assessment       Alvina is progressing well towards her goals.   Pt prognosis is Good.     Pt will continue to benefit from skilled outpatient occupational therapy to  address the deficits listed in the problem list box on initial evaluation, provide pt/family education and to maximize pt's level of independence in the home and community environment.     Pt's spiritual, cultural and educational needs considered and pt agreeable to plan of care and goals.     Anticipated barriers to occupational therapy: none    Goals:  Patient will tolerate multiple modalities L wrist to improve flexibility and decrease pain.  Patient will demonstrate increase in L wrist EXT and FLEX by 10 degrees AROM.  Patient will demonstrate AROM L wrist supination to 75 degrees within 6 weeks.  Patient will increase L  strength to 4-/5 within 6 weeks.   Patient will demonstrate independence in HEP at discharge.    Plan     Continue OT per established POC.    Caro Curry, OT

## 2019-07-02 ENCOUNTER — CLINICAL SUPPORT (OUTPATIENT)
Dept: REHABILITATION | Facility: HOSPITAL | Age: 79
End: 2019-07-02
Attending: ORTHOPAEDIC SURGERY
Payer: MEDICARE

## 2019-07-02 DIAGNOSIS — S52.502A TRAUMATIC CLOSED DISPLACED FRACTURE OF DISTAL END OF RADIUS AND ULNA, LEFT, INITIAL ENCOUNTER: Primary | ICD-10-CM

## 2019-07-02 DIAGNOSIS — S52.602A TRAUMATIC CLOSED DISPLACED FRACTURE OF DISTAL END OF RADIUS AND ULNA, LEFT, INITIAL ENCOUNTER: Primary | ICD-10-CM

## 2019-07-02 PROCEDURE — 97110 THERAPEUTIC EXERCISES: CPT

## 2019-07-02 PROCEDURE — 97010 HOT OR COLD PACKS THERAPY: CPT

## 2019-07-02 NOTE — PROGRESS NOTES
Occupational Therapy Daily Treatment Note     Name: Alvina Guallpa  Clinic Number: [id number]    Therapy Diagnosis:   Encounter Diagnosis   Name Primary?    Traumatic closed displaced fracture of distal end of radius and ulna, left, initial encounter Yes     Physician: River Russo DO    Visit Date: 7/2/2019    Physician Orders: ROM and strengthening L wrist  Medical Diagnosis: closed displaced fracture distal end of radius and ulna L UE  Evaluation Date: 06/11/2019  Authorization Period Expiration: 1231/2019  Plan of Care Certification Period: 07/19/2019  Visit #/Visits authorized: 5/12    Time In: 205  Time Out: 255  Total Billable Time: 45 minutes    Precautions: Standard and Fall    Subjective     Pt reports: some soreness on ulna bone  She was compliant with home exercise program.  Response to previous treatment: positive  Functional change: improving ROM and strength; L UE looks much improved with skin. Patient has cleaned skin and applied lotion making it much improved from peeling skin prior.    Pain: 3/10  Location: left wrist    Objective     Alvina received hot pack for 15 minutes to L wrist.    Alvina received therapeutic exercises to develop strength, ROM and flexibility including:  Extra light digi flex L hand x 30  AROM L wrist ext, flex, supination, radial/ulnar dev x 30 each using 1# dumbbell  Power flexor 6# BUE x 25    Home Exercises Provided and Patient Education Provided     Education provided:   - HEP    Written Home Exercises Provided: Patient instructed to cont prior HEP.  Exercises were reviewed and Alvina was able to demonstrate them prior to the end of the session.  Alvina demonstrated good  understanding of the education provided.     See EMR under Patient Instructions for exercises provided prior visit.    Assessment       Alvina is progressing well towards her goals.   Pt prognosis is Good.     Pt will continue to benefit from skilled outpatient occupational therapy to address the  deficits listed in the problem list box on initial evaluation, provide pt/family education and to maximize pt's level of independence in the home and community environment.     Pt's spiritual, cultural and educational needs considered and pt agreeable to plan of care and goals.     Anticipated barriers to occupational therapy: none    Goals:  Patient will tolerate multiple modalities L wrist to improve flexibility and decrease pain.  Patient will demonstrate increase in L wrist EXT and FLEX by 10 degrees AROM.  Patient will demonstrate AROM L wrist supination to 75 degrees within 6 weeks.  Patient will increase L  strength to 4-/5 within 6 weeks.   Patient will demonstrate independence in HEP at discharge.    Plan     Continue OT per established POC.    Caro Curry, OT

## 2019-07-05 ENCOUNTER — CLINICAL SUPPORT (OUTPATIENT)
Dept: REHABILITATION | Facility: HOSPITAL | Age: 79
End: 2019-07-05
Attending: ORTHOPAEDIC SURGERY
Payer: MEDICARE

## 2019-07-05 DIAGNOSIS — S52.602A TRAUMATIC CLOSED DISPLACED FRACTURE OF DISTAL END OF RADIUS AND ULNA, LEFT, INITIAL ENCOUNTER: Primary | ICD-10-CM

## 2019-07-05 DIAGNOSIS — S52.502A TRAUMATIC CLOSED DISPLACED FRACTURE OF DISTAL END OF RADIUS AND ULNA, LEFT, INITIAL ENCOUNTER: Primary | ICD-10-CM

## 2019-07-05 PROCEDURE — 97010 HOT OR COLD PACKS THERAPY: CPT

## 2019-07-05 PROCEDURE — 97110 THERAPEUTIC EXERCISES: CPT

## 2019-07-17 ENCOUNTER — OFFICE VISIT (OUTPATIENT)
Dept: ORTHOPEDICS | Facility: CLINIC | Age: 79
End: 2019-07-17
Payer: MEDICARE

## 2019-07-17 ENCOUNTER — TELEPHONE (OUTPATIENT)
Dept: ORTHOPEDICS | Facility: CLINIC | Age: 79
End: 2019-07-17

## 2019-07-17 VITALS
BODY MASS INDEX: 21.09 KG/M2 | HEART RATE: 66 BPM | SYSTOLIC BLOOD PRESSURE: 170 MMHG | RESPIRATION RATE: 18 BRPM | WEIGHT: 119.06 LBS | HEIGHT: 63 IN | DIASTOLIC BLOOD PRESSURE: 86 MMHG

## 2019-07-17 DIAGNOSIS — S52.509D: Primary | ICD-10-CM

## 2019-07-17 DIAGNOSIS — S52.609D CLOSED FRACTURE DISTAL RADIUS AND ULNA, UNSPECIFIED LATERALITY, WITH ROUTINE HEALING, SUBSEQUENT ENCOUNTER: Primary | ICD-10-CM

## 2019-07-17 DIAGNOSIS — S52.509D CLOSED FRACTURE DISTAL RADIUS AND ULNA, UNSPECIFIED LATERALITY, WITH ROUTINE HEALING, SUBSEQUENT ENCOUNTER: Primary | ICD-10-CM

## 2019-07-17 PROCEDURE — 99213 OFFICE O/P EST LOW 20 MIN: CPT | Mod: S$PBB,,, | Performed by: ORTHOPAEDIC SURGERY

## 2019-07-17 PROCEDURE — 99999 PR PBB SHADOW E&M-EST. PATIENT-LVL III: CPT | Mod: PBBFAC,,, | Performed by: ORTHOPAEDIC SURGERY

## 2019-07-17 PROCEDURE — 99999 PR PBB SHADOW E&M-EST. PATIENT-LVL III: ICD-10-PCS | Mod: PBBFAC,,, | Performed by: ORTHOPAEDIC SURGERY

## 2019-07-17 PROCEDURE — 99213 OFFICE O/P EST LOW 20 MIN: CPT | Mod: PBBFAC,PN | Performed by: ORTHOPAEDIC SURGERY

## 2019-07-17 PROCEDURE — 99213 PR OFFICE/OUTPT VISIT, EST, LEVL III, 20-29 MIN: ICD-10-PCS | Mod: S$PBB,,, | Performed by: ORTHOPAEDIC SURGERY

## 2019-07-17 RX ORDER — HYDROCODONE BITARTRATE AND ACETAMINOPHEN 5; 325 MG/1; MG/1
1 TABLET ORAL EVERY 6 HOURS PRN
Status: ON HOLD | COMMUNITY
End: 2023-08-11 | Stop reason: SDUPTHER

## 2019-07-17 NOTE — PROGRESS NOTES
Subjective:      Patient ID: Alvina Guallpa is a 79 y.o. female.    Chief Complaint: Pain of the Left Wrist      HPI: Ms. Guallpa returns today for follow-up on a left wrist fracture. At her last visit on 06/05/2019 she was placed in a removable splint.  She states she is doing well and her only complaint is she lacks strength in her hand.  She originally injured her wrist on 03/19/2019 after the patient fell in her yd after she tripped over a plant.  She is now nearly 4 months post injury.    ROS:  No new diagnosis/surgery/prescriptions since last office on 06/05/2019.  Constitution: Negative for chills and fever.   HENT: Negative for congestion.    Eyes: Negative for blurred vision.   Cardiovascular: Negative for chest pain.   Respiratory: Negative for cough.    Endocrine: Negative for polydipsia.   Hematologic/Lymphatic: Does not bruise/bleed easily.   Skin: Negative for itching.   Musculoskeletal: Negative for gout.   Gastrointestinal: Positive for heartburn. Negative for constipation and diarrhea.   Genitourinary: Negative for nocturia.   Neurological: Positive for headaches. Negative for seizures.   Psychiatric/Behavioral: Positive for depression.   Allergic/Immunologic: Positive for environmental allergies.       Objective:      Physical Exam:   General: AAOx3.  No acute distress  Vascular:  Pulses intact and equal bilaterally.  Capillary refill less than 3 seconds and equal bilaterally  Neurologic:  Pinprick and soft touch intact and equal bilaterally  Integment:  No ecchymosis, no errythema  Extremity:  Wrist:  Pronation/supination equal bilaterally 85/85 degrees. Dorsiflexion/volar flexion left wrist 65/20 degrees, right wrist 80/75°.  Nontender with palpation.  Nontender with motion.  strength decreased when compared to the chondral extremity of the left hand.  Radiography:  Personally reviewed x-rays of the left wrist completed on 07/15/2019 showed a healed distal radius fracture with  displacement.      Assessment:       Impression:  Healed left distal radius fracture.      Plan:       1.  Discussed physical examination and radiographic findings with the patient. Alvina understands that she has a healed radius fracture and in order to maximize her recovery she needs to continue with physical therapy with strengthening of her muscles.  2.  Refer back to physical therapy/occupational therapy start aggressive strengthening of the patient's wrist.  3.  May wear cock-up wrist splint as tolerated may discontinue it if desired.  4.  Any minor pain can be treated with over-the-counter medications dosed per box instructions.  5.  [facility] portal was discussed with the patient and information was given.  The patient was encouraged to use the portal for future encounters.  6.  Follow up in 6 weeks with an x-ray of the left wrist expect discharge the patient to full unrestricted activities at that visit

## 2019-08-19 ENCOUNTER — LAB VISIT (OUTPATIENT)
Dept: LAB | Facility: HOSPITAL | Age: 79
End: 2019-08-19
Attending: INTERNAL MEDICINE
Payer: MEDICARE

## 2019-08-19 DIAGNOSIS — R53.1 WEAKNESS: Primary | ICD-10-CM

## 2019-08-19 LAB
ALBUMIN SERPL BCP-MCNC: 3.8 G/DL (ref 3.5–5.2)
ALP SERPL-CCNC: 117 U/L (ref 55–135)
ALT SERPL W/O P-5'-P-CCNC: 15 U/L (ref 10–44)
ANION GAP SERPL CALC-SCNC: 11 MMOL/L (ref 8–16)
AST SERPL-CCNC: 24 U/L (ref 10–40)
BASOPHILS # BLD AUTO: 0.07 K/UL (ref 0–0.2)
BASOPHILS NFR BLD: 0.7 % (ref 0–1.9)
BILIRUB SERPL-MCNC: 0.9 MG/DL (ref 0.1–1)
BNP SERPL-MCNC: 324 PG/ML (ref 0–99)
BUN SERPL-MCNC: 11 MG/DL (ref 8–23)
CALCIUM SERPL-MCNC: 9.3 MG/DL (ref 8.7–10.5)
CHLORIDE SERPL-SCNC: 92 MMOL/L (ref 95–110)
CO2 SERPL-SCNC: 26 MMOL/L (ref 23–29)
CREAT SERPL-MCNC: 0.8 MG/DL (ref 0.5–1.4)
DIFFERENTIAL METHOD: ABNORMAL
EOSINOPHIL # BLD AUTO: 0.7 K/UL (ref 0–0.5)
EOSINOPHIL NFR BLD: 7.2 % (ref 0–8)
ERYTHROCYTE [DISTWIDTH] IN BLOOD BY AUTOMATED COUNT: 12.3 % (ref 11.5–14.5)
EST. GFR  (AFRICAN AMERICAN): >60 ML/MIN/1.73 M^2
EST. GFR  (NON AFRICAN AMERICAN): >60 ML/MIN/1.73 M^2
GLUCOSE SERPL-MCNC: 90 MG/DL (ref 70–110)
HCT VFR BLD AUTO: 40.2 % (ref 37–48.5)
HGB BLD-MCNC: 13.5 G/DL (ref 12–16)
IMM GRANULOCYTES # BLD AUTO: 0.06 K/UL (ref 0–0.04)
IMM GRANULOCYTES NFR BLD AUTO: 0.6 % (ref 0–0.5)
LYMPHOCYTES # BLD AUTO: 1.8 K/UL (ref 1–4.8)
LYMPHOCYTES NFR BLD: 18 % (ref 18–48)
MCH RBC QN AUTO: 31 PG (ref 27–31)
MCHC RBC AUTO-ENTMCNC: 33.6 G/DL (ref 32–36)
MCV RBC AUTO: 92 FL (ref 82–98)
MONOCYTES # BLD AUTO: 0.9 K/UL (ref 0.3–1)
MONOCYTES NFR BLD: 9.5 % (ref 4–15)
NEUTROPHILS # BLD AUTO: 6.3 K/UL (ref 1.8–7.7)
NEUTROPHILS NFR BLD: 64 % (ref 38–73)
NRBC BLD-RTO: 0 /100 WBC
PLATELET # BLD AUTO: 366 K/UL (ref 150–350)
PMV BLD AUTO: 11.7 FL (ref 9.2–12.9)
POTASSIUM SERPL-SCNC: 4.1 MMOL/L (ref 3.5–5.1)
PROT SERPL-MCNC: 7.1 G/DL (ref 6–8.4)
RBC # BLD AUTO: 4.35 M/UL (ref 4–5.4)
SODIUM SERPL-SCNC: 129 MMOL/L (ref 136–145)
WBC # BLD AUTO: 9.77 K/UL (ref 3.9–12.7)

## 2019-08-19 PROCEDURE — 85025 COMPLETE CBC W/AUTO DIFF WBC: CPT

## 2019-08-19 PROCEDURE — 36415 COLL VENOUS BLD VENIPUNCTURE: CPT

## 2019-08-19 PROCEDURE — 87086 URINE CULTURE/COLONY COUNT: CPT

## 2019-08-19 PROCEDURE — 80053 COMPREHEN METABOLIC PANEL: CPT

## 2019-08-19 PROCEDURE — 83880 ASSAY OF NATRIURETIC PEPTIDE: CPT

## 2019-08-21 LAB
BACTERIA UR CULT: NORMAL
BACTERIA UR CULT: NORMAL

## 2019-08-22 DIAGNOSIS — M25.532 LEFT WRIST PAIN: Primary | ICD-10-CM

## 2019-09-30 ENCOUNTER — OFFICE VISIT (OUTPATIENT)
Dept: ORTHOPEDICS | Facility: CLINIC | Age: 79
End: 2019-09-30
Payer: MEDICARE

## 2019-09-30 ENCOUNTER — HOSPITAL ENCOUNTER (OUTPATIENT)
Dept: RADIOLOGY | Facility: HOSPITAL | Age: 79
Discharge: HOME | End: 2019-09-30
Attending: ORTHOPAEDIC SURGERY
Payer: MEDICARE

## 2019-09-30 VITALS
HEART RATE: 72 BPM | HEIGHT: 63 IN | WEIGHT: 119.06 LBS | SYSTOLIC BLOOD PRESSURE: 178 MMHG | RESPIRATION RATE: 18 BRPM | DIASTOLIC BLOOD PRESSURE: 77 MMHG | BODY MASS INDEX: 21.09 KG/M2

## 2019-09-30 DIAGNOSIS — M25.532 LEFT WRIST PAIN: ICD-10-CM

## 2019-09-30 DIAGNOSIS — S52.602D CLOSED FRACTURE OF DISTAL RADIUS AND ULNA, LEFT, WITH ROUTINE HEALING, SUBSEQUENT ENCOUNTER: Primary | ICD-10-CM

## 2019-09-30 DIAGNOSIS — S52.502D CLOSED FRACTURE OF DISTAL RADIUS AND ULNA, LEFT, WITH ROUTINE HEALING, SUBSEQUENT ENCOUNTER: Primary | ICD-10-CM

## 2019-09-30 PROCEDURE — 99999 PR PBB SHADOW E&M-EST. PATIENT-LVL III: CPT | Mod: PBBFAC,,, | Performed by: ORTHOPAEDIC SURGERY

## 2019-09-30 PROCEDURE — 73110 X-RAY EXAM OF WRIST: CPT | Mod: TC,FY,LT

## 2019-09-30 PROCEDURE — 99213 OFFICE O/P EST LOW 20 MIN: CPT | Mod: S$PBB,,, | Performed by: ORTHOPAEDIC SURGERY

## 2019-09-30 PROCEDURE — 99213 PR OFFICE/OUTPT VISIT, EST, LEVL III, 20-29 MIN: ICD-10-PCS | Mod: S$PBB,,, | Performed by: ORTHOPAEDIC SURGERY

## 2019-09-30 PROCEDURE — 73110 XR WRIST COMPLETE 3 VIEWS LEFT: ICD-10-PCS | Mod: 26,LT,, | Performed by: RADIOLOGY

## 2019-09-30 PROCEDURE — 99999 PR PBB SHADOW E&M-EST. PATIENT-LVL III: ICD-10-PCS | Mod: PBBFAC,,, | Performed by: ORTHOPAEDIC SURGERY

## 2019-09-30 PROCEDURE — 73110 X-RAY EXAM OF WRIST: CPT | Mod: 26,LT,, | Performed by: RADIOLOGY

## 2019-09-30 PROCEDURE — 99213 OFFICE O/P EST LOW 20 MIN: CPT | Mod: PBBFAC,25 | Performed by: ORTHOPAEDIC SURGERY

## 2019-09-30 NOTE — PROGRESS NOTES
Subjective:      Patient ID: Alvina Guallpa is a 79 y.o. female.    Chief Complaint: Pain and Injury of the Left Wrist      HPI: Ms. Guallpa returns today for final follow-up on a distal radius fracture which was treated with cast immobilization.  She originally injured her left wrist on 03/19/2019 after she fell over a planned.  She was offered surgical intervention and declined it. At her last visit she was placed in a removable splint and sent to occupational therapy.  She stated she is relatively pain-free is doing well her only problem is she lacks some motion and has little weakness in her hand.    ROS:  No new diagnosis/surgery/prescriptions since last office visit on 07/17/2019.  Constitution: Negative for chills and fever.   HENT: Negative for congestion.    Eyes: Negative for blurred vision.   Cardiovascular: Negative for chest pain.   Respiratory: Negative for cough.    Endocrine: Negative for polydipsia.   Hematologic/Lymphatic: Does not bruise/bleed easily.   Skin: Negative for itching.   Musculoskeletal: Negative for gout.   Gastrointestinal: Positive for heartburn. Negative for constipation and diarrhea.   Genitourinary: Negative for nocturia.   Neurological: Positive for headaches. Negative for seizures.   Psychiatric/Behavioral: Positive for depression.   Allergic/Immunologic: Positive for environmental allergies.       Objective:      Physical Exam:   General: AAOx3.  No acute distress  Vascular:  Pulses intact and equal bilaterally.  Capillary refill less than 3 seconds and equal bilaterally  Neurologic:  Pinprick and soft touch intact and equal bilaterally  Integment:  No ecchymosis, no errythema  Extremity:  Wrist:  Pronation/supination equal bilaterally 90/90 degrees. Dorsiflexion/volar flexion left wrist 85/20 degrees, right wrist 85/80 degrees. No swelling. Nontender with palpation.  Nontender with motion.  Radiography:  Personally reviewed x-rays of the left wrist completed on  09/30/2019 showed a healed displaced angulated mildly shortened comminuted intra-articular distal radius fracture.      Assessment:       Impression:  Healed displaced intra-articular distal radius fracture, left wrist      Plan:       1.  Discussed physical examination and radiographic findings with the patient. Alvina understands that she has healed her fracture and is regain most of her motion.  At this point do not feel she will regain any more motion.  Functionally she is doing well and her pain is well controlled if she does develop any moderate pain she can return and entertain a steroid injection to her wrist.  2.  May discontinue wrist splint, the patient understands if she feels more comfortable wearing it she can continue to wear it.  3.  Any pain can be treated with over-the-counter medications dosed per box instructions.  4.  Finish current OT prescription then discharged HEP.  5.  Continue with home exercises as previously shown.  She can return to full unrestricted activities with respect to her wrist.  6.  [facility] portal was discussed with the patient and information was given.  The patient was encouraged to use the portal for future encounters.  7.  Follow up p.r.n..

## 2020-03-03 ENCOUNTER — HOSPITAL ENCOUNTER (EMERGENCY)
Facility: HOSPITAL | Age: 80
Discharge: HOME | End: 2020-03-03
Attending: FAMILY MEDICINE
Payer: MEDICARE

## 2020-03-03 VITALS
BODY MASS INDEX: 20.14 KG/M2 | SYSTOLIC BLOOD PRESSURE: 206 MMHG | HEART RATE: 71 BPM | OXYGEN SATURATION: 100 % | TEMPERATURE: 98 F | RESPIRATION RATE: 18 BRPM | WEIGHT: 118 LBS | HEIGHT: 64 IN | DIASTOLIC BLOOD PRESSURE: 106 MMHG

## 2020-03-03 DIAGNOSIS — T78.3XXA ALLERGIC ANGIOEDEMA, INITIAL ENCOUNTER: ICD-10-CM

## 2020-03-03 DIAGNOSIS — R06.02 SOB (SHORTNESS OF BREATH): ICD-10-CM

## 2020-03-03 DIAGNOSIS — R22.0 FACIAL SWELLING: Primary | ICD-10-CM

## 2020-03-03 PROCEDURE — 96372 THER/PROPH/DIAG INJ SC/IM: CPT

## 2020-03-03 PROCEDURE — 93005 ELECTROCARDIOGRAM TRACING: CPT

## 2020-03-03 PROCEDURE — 99284 EMERGENCY DEPT VISIT MOD MDM: CPT | Mod: 25

## 2020-03-03 PROCEDURE — 93010 ELECTROCARDIOGRAM REPORT: CPT | Mod: ,,, | Performed by: INTERNAL MEDICINE

## 2020-03-03 PROCEDURE — 63600175 PHARM REV CODE 636 W HCPCS: Performed by: FAMILY MEDICINE

## 2020-03-03 PROCEDURE — 93010 EKG 12-LEAD: ICD-10-PCS | Mod: ,,, | Performed by: INTERNAL MEDICINE

## 2020-03-03 RX ORDER — METHYLPREDNISOLONE SOD SUCC 125 MG
125 VIAL (EA) INJECTION
Status: COMPLETED | OUTPATIENT
Start: 2020-03-03 | End: 2020-03-03

## 2020-03-03 RX ADMIN — METHYLPREDNISOLONE SODIUM SUCCINATE 125 MG: 125 INJECTION, POWDER, FOR SOLUTION INTRAMUSCULAR; INTRAVENOUS at 05:03

## 2020-03-03 NOTE — ED PROVIDER NOTES
Encounter Date: 3/3/2020       History     Chief Complaint   Patient presents with    Facial Swelling     noted to the Left eye. onset 1-2 hours pta. Patient does not remember taking anything or eating anything.     Shortness of Breath     Patient reports chest tightness and SOB onset approx yesterday morning.      80-year-old female presents complaining swelling around the left eye and she has had some mild pruritus there is a high level of pollen in the area currently with a number pupil having allergic type symptoms no history of trauma to the eye rubbing any chemical in the on she does not take an ACE-inhibitor        Review of patient's allergies indicates:   Allergen Reactions    Penicillins Hives     Past Medical History:   Diagnosis Date    Anxiety     Chronic neck pain     Depression     Hypertension     Osteoporosis     Seasonal allergies     TIA (transient ischemic attack)      Past Surgical History:   Procedure Laterality Date    HYSTERECTOMY      NASAL POLYP SURGERY Bilateral     NECK SURGERY       History reviewed. No pertinent family history.  Social History     Tobacco Use    Smoking status: Never Smoker    Smokeless tobacco: Never Used   Substance Use Topics    Alcohol use: No     Alcohol/week: 0.0 standard drinks     Frequency: Never    Drug use: No     Review of Systems   Constitutional: Negative for fever.   HENT: Negative for sore throat.    Eyes: Positive for itching. Negative for pain and discharge.   Respiratory: Negative for shortness of breath.    Cardiovascular: Negative for chest pain.   Gastrointestinal: Negative for nausea.   Genitourinary: Negative for dysuria.   Musculoskeletal: Negative for back pain.   Skin: Negative for rash.   Neurological: Negative for weakness.   Hematological: Does not bruise/bleed easily.       Physical Exam     Initial Vitals [03/03/20 1612]   BP Pulse Resp Temp SpO2   (!) 206/106 71 18 97.9 °F (36.6 °C) 100 %      MAP       --          Physical Exam    Nursing note and vitals reviewed.  Constitutional: She appears well-developed and well-nourished. She is not diaphoretic. No distress.   HENT:   Head: Normocephalic and atraumatic.   Right Ear: External ear normal.   Left Ear: External ear normal.   Eyes: Pupils are equal, round, and reactive to light. Right eye exhibits no discharge. Left eye exhibits no discharge.   Minimal translucent swelling around the left eyelid head has the appearance of an allergic angioedema the cornea and the sclera is not infected   Neck: No tracheal deviation present. No JVD present.   Cardiovascular: Exam reveals no friction rub.    No murmur heard.  Pulmonary/Chest: No stridor. No respiratory distress. She has no wheezes. She has no rales.   Abdominal: Bowel sounds are normal. She exhibits no distension.   Musculoskeletal: Normal range of motion.   Neurological: She is alert.   Skin: Skin is warm.   Psychiatric: She has a normal mood and affect.         ED Course   Procedures  Labs Reviewed - No data to display  EKG Readings: (Independently Interpreted)   Initial Reading: No STEMI. Rhythm: Normal Sinus Rhythm. Heart Rate: 71. Ectopy: No Ectopy. Conduction: Normal. ST Segments: Normal ST Segments. T Waves: Normal.       Imaging Results    None                                           Clinical Impression:       ICD-10-CM ICD-9-CM   1. Facial swelling R22.0 784.2   2. SOB (shortness of breath) R06.02 786.05   3. Allergic angioedema, initial encounter T78.3XXA 995.1             ED Disposition Condition    Discharge Stable        ED Prescriptions     None        Follow-up Information    None                                    Morgan Arevalo MD  03/03/20 3572

## 2020-06-14 ENCOUNTER — HOSPITAL ENCOUNTER (EMERGENCY)
Facility: HOSPITAL | Age: 80
Discharge: HOME | End: 2020-06-14
Attending: EMERGENCY MEDICINE
Payer: MEDICARE

## 2020-06-14 VITALS
HEIGHT: 64 IN | HEART RATE: 96 BPM | TEMPERATURE: 99 F | OXYGEN SATURATION: 96 % | SYSTOLIC BLOOD PRESSURE: 174 MMHG | RESPIRATION RATE: 18 BRPM | DIASTOLIC BLOOD PRESSURE: 80 MMHG | WEIGHT: 123 LBS | BODY MASS INDEX: 21 KG/M2

## 2020-06-14 DIAGNOSIS — L50.9 URTICARIA: ICD-10-CM

## 2020-06-14 PROCEDURE — 99284 EMERGENCY DEPT VISIT MOD MDM: CPT | Mod: 25

## 2020-06-14 PROCEDURE — 63600175 PHARM REV CODE 636 W HCPCS: Performed by: NURSE PRACTITIONER

## 2020-06-14 PROCEDURE — 96372 THER/PROPH/DIAG INJ SC/IM: CPT

## 2020-06-14 RX ORDER — METHYLPREDNISOLONE 4 MG/1
4 TABLET ORAL DAILY
COMMUNITY
End: 2020-12-08

## 2020-06-14 RX ORDER — HYDROXYZINE HYDROCHLORIDE 25 MG/1
25 TABLET, FILM COATED ORAL 3 TIMES DAILY PRN
Qty: 25 TABLET | Refills: 0 | Status: SHIPPED | OUTPATIENT
Start: 2020-06-14 | End: 2022-09-14

## 2020-06-14 RX ORDER — CELECOXIB 100 MG/1
100 CAPSULE ORAL 2 TIMES DAILY
Status: ON HOLD | COMMUNITY
End: 2023-08-11 | Stop reason: HOSPADM

## 2020-06-14 RX ORDER — DIPHENHYDRAMINE HYDROCHLORIDE 50 MG/ML
25 INJECTION INTRAMUSCULAR; INTRAVENOUS
Status: COMPLETED | OUTPATIENT
Start: 2020-06-14 | End: 2020-06-14

## 2020-06-14 RX ADMIN — DIPHENHYDRAMINE HYDROCHLORIDE 25 MG: 50 INJECTION INTRAMUSCULAR; INTRAVENOUS at 06:06

## 2020-06-14 NOTE — DISCHARGE INSTRUCTIONS
Cont with Medrol dose pack    Atarax as prescribed    Derm referral     Return to ED if symptoms worsen

## 2020-06-14 NOTE — ED PROVIDER NOTES
Encounter Date: 6/14/2020       History     Chief Complaint   Patient presents with    Allergic Reaction     Alvina Guallpa is a 80 year old female with past medical history including anxiety, depression, hypertension and CVA. She presents to ED with granddaughter for generalized itchy rash    No airway compromise or wheezing. Vitals are stable. Patient does not appear to be in any distress at this time    Patient reports rash start last Saturday. She was seen by her PCP on Tues and given a steroid injection. Rash did not improve. She was started on steroid dose pack 2 days after injection.    She reports that she last took a antihistamine on Monday    No fever, chills, chest pain, dyspnea, weakness, dizziness, syncope or vomiting        Review of patient's allergies indicates:   Allergen Reactions    Penicillins Hives     Past Medical History:   Diagnosis Date    Anxiety     Chronic neck pain     Depression     Hypertension     Osteoporosis     Seasonal allergies     TIA (transient ischemic attack)      Past Surgical History:   Procedure Laterality Date    HYSTERECTOMY      NASAL POLYP SURGERY Bilateral     NECK SURGERY       History reviewed. No pertinent family history.  Social History     Tobacco Use    Smoking status: Never Smoker    Smokeless tobacco: Never Used   Substance Use Topics    Alcohol use: No     Alcohol/week: 0.0 standard drinks     Frequency: Never    Drug use: No     Review of Systems   Constitutional: Negative.    HENT: Negative.    Eyes: Negative.    Respiratory: Negative.    Cardiovascular: Negative.    Gastrointestinal: Negative.    Endocrine: Negative.    Genitourinary: Negative.    Musculoskeletal: Negative.    Skin: Positive for rash. Negative for pallor.   Allergic/Immunologic: Negative.    Neurological: Negative.    Hematological: Negative.    Psychiatric/Behavioral: Negative.    All other systems reviewed and are negative.      Physical Exam     Initial Vitals [06/14/20  1726]   BP Pulse Resp Temp SpO2   (!) 174/80 96 18 98.6 °F (37 °C) 96 %      MAP       --         Physical Exam    Nursing note and vitals reviewed.  Constitutional: She appears well-developed and well-nourished. She is not diaphoretic. No distress.   HENT:   Head: Normocephalic.   Right Ear: External ear normal.   Left Ear: External ear normal.   Nose: Nose normal.   Mouth/Throat: Oropharynx is clear and moist.   Eyes: Conjunctivae are normal.   Neck: Normal range of motion. Neck supple.   Cardiovascular: Normal rate.   Pulmonary/Chest: Breath sounds normal.   Musculoskeletal: Normal range of motion.   Neurological: She is alert and oriented to person, place, and time. GCS score is 15. GCS eye subscore is 4. GCS verbal subscore is 5. GCS motor subscore is 6.   Skin: Skin is warm. Capillary refill takes less than 2 seconds. Rash noted. Rash is urticarial. No erythema.   Psychiatric: She has a normal mood and affect. Her behavior is normal. Judgment and thought content normal.         ED Course   Procedures  Labs Reviewed - No data to display       Imaging Results    None          Medical Decision Making:   Initial Assessment:   Patient with granddaughter for generalized itchy rash    No airway compromise or wheezing. Vitals are stable. Patient does not appear to be in any distress at this time    Patient reports rash start last Saturday. She was seen by her PCP on Tues and given a steroid injection. Rash did not improve. She was started on steroid dose pack 2 days after injection.    She reports that she last took a antihistamine on Monday    No fever, chills, chest pain, dyspnea, weakness, dizziness, syncope or vomiting    Differential Diagnosis:   Allergic dermatitis, contact dermatitis, medication reaction  ED Management:  Benadryl injection admin    Discussed physical exam findings with patient  No acute emergent medical condition identified at this time to warrant further testing/diagnostics  At this time, I  believe the patient is clinically stable for discharge.   Patient to follow up with PCP in 1-2 days.  The patient acknowledges that close follow up with a MD is required after all ER visits  Pt given instructions; take all medications prescribed in the ER as directed.   Patient agrees to comply with all instruction and direction given in the ER  Pt agrees to return to ER if any symptoms reoccur                                          Clinical Impression:       ICD-10-CM ICD-9-CM   1. Urticaria  L50.9 708.9             ED Disposition Condition    Discharge Stable        ED Prescriptions     Medication Sig Dispense Start Date End Date Auth. Provider    hydrOXYzine HCL (ATARAX) 25 MG tablet Take 1 tablet (25 mg total) by mouth 3 (three) times daily as needed for Itching or Anxiety. 25 tablet 6/14/2020  Bruna Nazario NP        Follow-up Information    None                                    Bruna Nazario NP  06/15/20 1917

## 2021-07-14 ENCOUNTER — HOSPITAL ENCOUNTER (EMERGENCY)
Facility: HOSPITAL | Age: 81
Discharge: HOME | End: 2021-07-14
Payer: MEDICARE

## 2021-07-14 VITALS
TEMPERATURE: 99 F | HEIGHT: 60 IN | OXYGEN SATURATION: 96 % | SYSTOLIC BLOOD PRESSURE: 149 MMHG | WEIGHT: 109 LBS | RESPIRATION RATE: 17 BRPM | DIASTOLIC BLOOD PRESSURE: 96 MMHG | BODY MASS INDEX: 21.4 KG/M2 | HEART RATE: 91 BPM

## 2021-07-14 DIAGNOSIS — S60.222A TRAUMATIC HEMATOMA OF LEFT HAND, INITIAL ENCOUNTER: Primary | ICD-10-CM
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
## 2021-07-14 DIAGNOSIS — S61.412A LACERATION OF LEFT HAND WITHOUT FOREIGN BODY, INITIAL ENCOUNTER: ICD-10-CM

## 2021-07-14 PROCEDURE — 73110 X-RAY EXAM OF WRIST: CPT | Mod: TC,FY,LT

## 2021-07-14 PROCEDURE — 73130 X-RAY EXAM OF HAND: CPT | Mod: 26,LT,, | Performed by: RADIOLOGY

## 2021-07-14 PROCEDURE — 73130 X-RAY EXAM OF HAND: CPT | Mod: TC,FY,LT

## 2021-07-14 PROCEDURE — 73130 XR HAND COMPLETE 3 VIEW LEFT: ICD-10-PCS | Mod: 26,LT,, | Performed by: RADIOLOGY

## 2021-07-14 PROCEDURE — 90471 IMMUNIZATION ADMIN: CPT | Performed by: PHYSICIAN ASSISTANT

## 2021-07-14 PROCEDURE — 73110 XR WRIST COMPLETE 3 VIEWS LEFT: ICD-10-PCS | Mod: 26,LT,, | Performed by: RADIOLOGY

## 2021-07-14 PROCEDURE — 73110 X-RAY EXAM OF WRIST: CPT | Mod: 26,LT,, | Performed by: RADIOLOGY

## 2021-07-14 PROCEDURE — 90714 TD VACC NO PRESV 7 YRS+ IM: CPT | Performed by: PHYSICIAN ASSISTANT

## 2021-07-14 PROCEDURE — 63600175 PHARM REV CODE 636 W HCPCS: Performed by: PHYSICIAN ASSISTANT

## 2021-07-14 PROCEDURE — 99284 EMERGENCY DEPT VISIT MOD MDM: CPT | Mod: 25

## 2021-07-14 RX ADMIN — TETANUS AND DIPHTHERIA TOXOIDS ADSORBED 0.5 ML: 2; 2 INJECTION INTRAMUSCULAR at 03:07

## 2021-12-21 NOTE — PROCEDURES
Procedures:  Left wrist fracture reduction.         The patient's left wrist was anesthetized utilizing a hematoma block with a 50/50 mixture of lidocaine and Marcaine. After the hematoma block it set the patient's wrist had longitudinal traction applied and then the fracture was over reduced and then reduced to anatomic alignment. The reduction was maintained in a short-arm fiberglass cast was then placed on the patient's wrist with good 3 point molding.  The patient tolerated the procedure well without complications.  
as tolerated

## 2023-06-22 ENCOUNTER — HOSPITAL ENCOUNTER (EMERGENCY)
Facility: HOSPITAL | Age: 83
Discharge: HOME | End: 2023-06-22
Payer: MEDICARE

## 2023-06-22 VITALS
DIASTOLIC BLOOD PRESSURE: 78 MMHG | WEIGHT: 115 LBS | SYSTOLIC BLOOD PRESSURE: 135 MMHG | TEMPERATURE: 98 F | OXYGEN SATURATION: 99 % | HEART RATE: 91 BPM | RESPIRATION RATE: 18 BRPM | HEIGHT: 64 IN | BODY MASS INDEX: 19.63 KG/M2

## 2023-06-22 DIAGNOSIS — E87.1 HYPONATREMIA: Primary | ICD-10-CM

## 2023-06-22 DIAGNOSIS — G89.29 CHRONIC MIDLINE LOW BACK PAIN WITHOUT SCIATICA: ICD-10-CM

## 2023-06-22 DIAGNOSIS — M54.50 CHRONIC MIDLINE LOW BACK PAIN WITHOUT SCIATICA: ICD-10-CM

## 2023-06-22 DIAGNOSIS — Z87.81 HISTORY OF VERTEBRAL COMPRESSION FRACTURE: ICD-10-CM

## 2023-06-22 DIAGNOSIS — R41.0 CHRONIC CONFUSION: ICD-10-CM

## 2023-06-22 LAB
ALBUMIN SERPL BCP-MCNC: 4.1 G/DL (ref 3.5–5.2)
ALP SERPL-CCNC: 81 U/L (ref 55–135)
ALT SERPL W/O P-5'-P-CCNC: 17 U/L (ref 10–44)
AMORPH CRY URNS QL MICRO: ABNORMAL
ANION GAP SERPL CALC-SCNC: 11 MMOL/L (ref 8–16)
ANION GAP SERPL CALC-SCNC: 13 MMOL/L (ref 8–16)
AST SERPL-CCNC: 29 U/L (ref 10–40)
BACTERIA #/AREA URNS HPF: ABNORMAL /HPF
BASOPHILS # BLD AUTO: 0.05 K/UL (ref 0–0.2)
BASOPHILS NFR BLD: 0.5 % (ref 0–1.9)
BILIRUB SERPL-MCNC: 0.9 MG/DL (ref 0.1–1)
BILIRUB UR QL STRIP: NEGATIVE
BUN SERPL-MCNC: 14 MG/DL (ref 8–23)
BUN SERPL-MCNC: 17 MG/DL (ref 8–23)
CALCIUM SERPL-MCNC: 8.9 MG/DL (ref 8.7–10.5)
CALCIUM SERPL-MCNC: 9.4 MG/DL (ref 8.7–10.5)
CHLORIDE SERPL-SCNC: 93 MMOL/L (ref 95–110)
CHLORIDE SERPL-SCNC: 99 MMOL/L (ref 95–110)
CLARITY UR: CLEAR
CO2 SERPL-SCNC: 22 MMOL/L (ref 23–29)
CO2 SERPL-SCNC: 23 MMOL/L (ref 23–29)
COLOR UR: YELLOW
CREAT SERPL-MCNC: 0.8 MG/DL (ref 0.5–1.4)
CREAT SERPL-MCNC: 0.9 MG/DL (ref 0.5–1.4)
DIFFERENTIAL METHOD: ABNORMAL
EOSINOPHIL # BLD AUTO: 0.1 K/UL (ref 0–0.5)
EOSINOPHIL NFR BLD: 1.1 % (ref 0–8)
ERYTHROCYTE [DISTWIDTH] IN BLOOD BY AUTOMATED COUNT: 11.8 % (ref 11.5–14.5)
EST. GFR  (NO RACE VARIABLE): >60 ML/MIN/1.73 M^2
EST. GFR  (NO RACE VARIABLE): >60 ML/MIN/1.73 M^2
GLUCOSE SERPL-MCNC: 116 MG/DL (ref 70–110)
GLUCOSE SERPL-MCNC: 121 MG/DL (ref 70–110)
GLUCOSE UR QL STRIP: NEGATIVE
HCT VFR BLD AUTO: 38.4 % (ref 37–48.5)
HGB BLD-MCNC: 13.6 G/DL (ref 12–16)
HGB UR QL STRIP: ABNORMAL
HYALINE CASTS #/AREA URNS LPF: 2 /LPF
IMM GRANULOCYTES # BLD AUTO: 0.04 K/UL (ref 0–0.04)
IMM GRANULOCYTES NFR BLD AUTO: 0.4 % (ref 0–0.5)
KETONES UR QL STRIP: ABNORMAL
LEUKOCYTE ESTERASE UR QL STRIP: NEGATIVE
LYMPHOCYTES # BLD AUTO: 2 K/UL (ref 1–4.8)
LYMPHOCYTES NFR BLD: 19.3 % (ref 18–48)
MCH RBC QN AUTO: 31.5 PG (ref 27–31)
MCHC RBC AUTO-ENTMCNC: 35.4 G/DL (ref 32–36)
MCV RBC AUTO: 89 FL (ref 82–98)
MICROSCOPIC COMMENT: ABNORMAL
MONOCYTES # BLD AUTO: 0.8 K/UL (ref 0.3–1)
MONOCYTES NFR BLD: 7.5 % (ref 4–15)
NEUTROPHILS # BLD AUTO: 7.3 K/UL (ref 1.8–7.7)
NEUTROPHILS NFR BLD: 71.2 % (ref 38–73)
NITRITE UR QL STRIP: NEGATIVE
NRBC BLD-RTO: 0 /100 WBC
PH UR STRIP: 7 [PH] (ref 5–8)
PLATELET # BLD AUTO: 269 K/UL (ref 150–450)
PMV BLD AUTO: 11.4 FL (ref 9.2–12.9)
POTASSIUM SERPL-SCNC: 3.4 MMOL/L (ref 3.5–5.1)
POTASSIUM SERPL-SCNC: 3.5 MMOL/L (ref 3.5–5.1)
PROT SERPL-MCNC: 6.7 G/DL (ref 6–8.4)
PROT UR QL STRIP: ABNORMAL
RBC # BLD AUTO: 4.32 M/UL (ref 4–5.4)
RBC #/AREA URNS HPF: 5 /HPF (ref 0–4)
SODIUM SERPL-SCNC: 128 MMOL/L (ref 136–145)
SODIUM SERPL-SCNC: 133 MMOL/L (ref 136–145)
SP GR UR STRIP: 1.01 (ref 1–1.03)
URN SPEC COLLECT METH UR: ABNORMAL
UROBILINOGEN UR STRIP-ACNC: 1 EU/DL
WBC # BLD AUTO: 10.19 K/UL (ref 3.9–12.7)
WBC #/AREA URNS HPF: 6 /HPF (ref 0–5)

## 2023-06-22 PROCEDURE — 96360 HYDRATION IV INFUSION INIT: CPT

## 2023-06-22 PROCEDURE — 99285 EMERGENCY DEPT VISIT HI MDM: CPT | Mod: 25

## 2023-06-22 PROCEDURE — 70450 CT HEAD WITHOUT CONTRAST: ICD-10-PCS | Mod: 26,,, | Performed by: RADIOLOGY

## 2023-06-22 PROCEDURE — 70450 CT HEAD/BRAIN W/O DYE: CPT | Mod: 26,,, | Performed by: RADIOLOGY

## 2023-06-22 PROCEDURE — 80048 BASIC METABOLIC PNL TOTAL CA: CPT | Mod: XB | Performed by: NURSE PRACTITIONER

## 2023-06-22 PROCEDURE — 80053 COMPREHEN METABOLIC PANEL: CPT | Performed by: NURSE PRACTITIONER

## 2023-06-22 PROCEDURE — 81000 URINALYSIS NONAUTO W/SCOPE: CPT | Performed by: NURSE PRACTITIONER

## 2023-06-22 PROCEDURE — 25000003 PHARM REV CODE 250: Performed by: NURSE PRACTITIONER

## 2023-06-22 PROCEDURE — 70450 CT HEAD/BRAIN W/O DYE: CPT | Mod: TC

## 2023-06-22 PROCEDURE — 96361 HYDRATE IV INFUSION ADD-ON: CPT

## 2023-06-22 PROCEDURE — 85025 COMPLETE CBC W/AUTO DIFF WBC: CPT | Performed by: NURSE PRACTITIONER

## 2023-06-22 RX ORDER — HYDROCODONE BITARTRATE AND ACETAMINOPHEN 7.5; 325 MG/1; MG/1
1 TABLET ORAL 2 TIMES DAILY
Status: ON HOLD | COMMUNITY
Start: 2023-06-11 | End: 2023-08-11 | Stop reason: HOSPADM

## 2023-06-22 RX ORDER — HYDROCODONE BITARTRATE AND ACETAMINOPHEN 5; 325 MG/1; MG/1
1 TABLET ORAL
Status: COMPLETED | OUTPATIENT
Start: 2023-06-22 | End: 2023-06-22

## 2023-06-22 RX ORDER — LISINOPRIL 10 MG/1
10 TABLET ORAL
Status: ON HOLD | COMMUNITY
Start: 2023-05-10 | End: 2023-08-11 | Stop reason: HOSPADM

## 2023-06-22 RX ORDER — SODIUM CHLORIDE 9 MG/ML
1000 INJECTION, SOLUTION INTRAVENOUS
Status: COMPLETED | OUTPATIENT
Start: 2023-06-22 | End: 2023-06-22

## 2023-06-22 RX ORDER — DILTIAZEM HYDROCHLORIDE 120 MG/1
1 CAPSULE, EXTENDED RELEASE ORAL EVERY 12 HOURS
Status: ON HOLD | COMMUNITY
Start: 2023-04-20 | End: 2023-08-11 | Stop reason: HOSPADM

## 2023-06-22 RX ADMIN — SODIUM CHLORIDE 1000 ML: 9 INJECTION, SOLUTION INTRAVENOUS at 04:06

## 2023-06-22 RX ADMIN — HYDROCODONE BITARTRATE AND ACETAMINOPHEN 1 TABLET: 5; 325 TABLET ORAL at 06:06

## 2023-06-22 RX ADMIN — SODIUM CHLORIDE 1000 ML: 9 INJECTION, SOLUTION INTRAVENOUS at 03:06

## 2023-06-22 NOTE — ED PROVIDER NOTES
"Encounter Date: 6/22/2023       History     Chief Complaint   Patient presents with    Altered Mental Status     Started yesterday. Grandson reports patient woke up yesterday more confused than normal. AAOx4 during triage.     Back Pain     Patient reports lower back pain that started yesterday. Patient states "I think I may have a bladder infection". Grandson reports patient was incontinent of urine and stool last night which does happen from time to time, maybe once every three to four months.     Chills     Onset yesterday. Afebrile at this time.      Patient is a 83-year-old female presents emergency room with increasing confusion as well as back pain per grandson.  Grandson states patient lives with him, and states he is noticed she is been more confused than normal over the past couple days and complain of back pain.  He stated yesterday patient states she felt like she had a catheter in her, which she did not.  Grandson states patient had an accident couple days ago where she had urinated and defecated on herself in her brief.  This does not happen very often per grandson.  Patient denies having any fevers, nausea, vomiting, diarrhea.  She states she has had some chills yesterday.  She denies any runny nose or sore throat.  She denies any chest pain or shortness of breath.  No reports of abdominal pain.  Movements have been normal, last 1 was yesterday.    Review of patient's allergies indicates:   Allergen Reactions    Penicillins Hives     Past Medical History:   Diagnosis Date    Anxiety     Chronic neck pain     Depression     Hypertension     Osteoporosis     Seasonal allergies     TIA (transient ischemic attack)      Past Surgical History:   Procedure Laterality Date    HYSTERECTOMY      INSERTION OF PACEMAKER      NASAL POLYP SURGERY Bilateral     NECK SURGERY       Family History   Problem Relation Age of Onset    Cancer Mother     Stomach cancer Mother      Social History     Tobacco Use    Smoking " status: Never    Smokeless tobacco: Never   Substance Use Topics    Alcohol use: No     Alcohol/week: 0.0 standard drinks    Drug use: No     Review of Systems   Constitutional: Negative.    HENT: Negative.     Eyes: Negative.    Respiratory: Negative.     Cardiovascular: Negative.    Gastrointestinal: Negative.    Endocrine: Negative.    Genitourinary:  Positive for urgency.   Musculoskeletal:  Positive for back pain.   Skin: Negative.    Allergic/Immunologic: Negative for food allergies.   Neurological: Negative.         Increased confusion   Hematological: Negative.    Psychiatric/Behavioral: Negative.     All other systems reviewed and are negative.    Physical Exam     Initial Vitals [06/22/23 1445]   BP Pulse Resp Temp SpO2   (!) 121/93 110 18 97.9 °F (36.6 °C) 95 %      MAP       --         Physical Exam    Nursing note and vitals reviewed.  Constitutional: She appears well-developed and well-nourished. She is not diaphoretic. No distress.   HENT:   Head: Normocephalic and atraumatic.   Mouth/Throat: Oropharynx is clear and moist.   Eyes: Conjunctivae and EOM are normal. Pupils are equal, round, and reactive to light. No scleral icterus.   Neck: No thyromegaly present. No tracheal deviation present. No JVD present.   Normal range of motion.  Cardiovascular:  Regular rhythm, normal heart sounds and intact distal pulses.           No murmur heard.  Slightly tachycardic at 110   Pulmonary/Chest: Breath sounds normal. No respiratory distress. She has no wheezes. She has no rhonchi. She has no rales. She exhibits no tenderness.   Abdominal: Abdomen is soft. Bowel sounds are normal. She exhibits no distension and no mass. There is no abdominal tenderness. There is no rebound.   Musculoskeletal:         General: No tenderness or edema. Normal range of motion.      Cervical back: Normal range of motion.     Lymphadenopathy:     She has no cervical adenopathy.   Neurological: She is alert and oriented to person,  place, and time. She has normal strength. No cranial nerve deficit. GCS score is 15. GCS eye subscore is 4. GCS verbal subscore is 5. GCS motor subscore is 6.   Skin: Skin is warm and dry. Capillary refill takes 2 to 3 seconds.   Psychiatric: She has a normal mood and affect.       ED Course   Procedures  Labs Reviewed   URINALYSIS, REFLEX TO URINE CULTURE - Abnormal; Notable for the following components:       Result Value    Protein, UA 2+ (*)     Ketones, UA Trace (*)     Occult Blood UA 1+ (*)     All other components within normal limits    Narrative:     Preferred Collection Type->Urine, Clean Catch  Specimen Source->Urine   CBC W/ AUTO DIFFERENTIAL - Abnormal; Notable for the following components:    MCH 31.5 (*)     All other components within normal limits   COMPREHENSIVE METABOLIC PANEL - Abnormal; Notable for the following components:    Sodium 128 (*)     Chloride 93 (*)     CO2 22 (*)     Glucose 116 (*)     All other components within normal limits   URINALYSIS MICROSCOPIC - Abnormal; Notable for the following components:    RBC, UA 5 (*)     WBC, UA 6 (*)     Hyaline Casts, UA 2 (*)     All other components within normal limits    Narrative:     Preferred Collection Type->Urine, Clean Catch  Specimen Source->Urine   BASIC METABOLIC PANEL - Abnormal; Notable for the following components:    Sodium 133 (*)     Potassium 3.4 (*)     Glucose 121 (*)     All other components within normal limits          Imaging Results              CT Head Without Contrast (Final result)  Result time 06/22/23 17:14:47      Final result by Rich Hood MD (06/22/23 17:14:47)                   Impression:      No acute intracranial abnormality.  Moderate to severe chronic microvascular ischemia.      Electronically signed by: Rich Hood  Date:    06/22/2023  Time:    17:14               Narrative:    EXAMINATION:  CT HEAD WITHOUT CONTRAST    CLINICAL HISTORY:  Mental status change, unknown  cause;    TECHNIQUE:  Low dose axial CT images obtained throughout the head without intravenous contrast. Sagittal and coronal reconstructions were performed.    COMPARISON:  02/27/2017    FINDINGS:  Intracranial compartment:    Ventricles and sulci are normal in size for age without evidence of hydrocephalus. No extra-axial blood or fluid collections.    Moderate to severe periventricular and deep white matter changes of chronic microvascular ischemia.  No parenchymal mass, hemorrhage, edema or major vascular distribution infarct.    Skull/extracranial contents (limited evaluation): No fracture. Mastoid air cells and paranasal sinuses are essentially clear.                                    X-Rays:   Independently Interpreted Readings:   Other Readings:  CT of the head     No acute intracranial abnormalities identified, microscopic vascular disease is noted most likely secondary to age.  Radiologist report reviewed, I agree, see findings below.      FINDINGS:  Intracranial compartment:     Ventricles and sulci are normal in size for age without evidence of hydrocephalus. No extra-axial blood or fluid collections.     Moderate to severe periventricular and deep white matter changes of chronic microvascular ischemia.  No parenchymal mass, hemorrhage, edema or major vascular distribution infarct.     Skull/extracranial contents (limited evaluation): No fracture. Mastoid air cells and paranasal sinuses are essentially clear.  Medications   HYDROcodone-acetaminophen 5-325 mg per tablet 1 tablet (has no administration in time range)   sodium chloride 0.9% bolus 1,000 mL 1,000 mL (0 mLs Intravenous Stopped 6/22/23 1613)   0.9%  NaCl infusion (1,000 mLs Intravenous New Bag 6/22/23 1646)     Medical Decision Making:   History:   I obtained history from: someone other than patient.       <> Summary of History: Details as noted in HPI per grandson.  Old Medical Records: I decided to obtain old medical records.  Initial  Assessment:   Patient was seen examined emergency room, no acute distress this time.  Detailed assessment as noted above.  Differential Diagnosis:   UTI, kidney stone, pyelonephritis, hydronephrosis, dementia, dehydration  Clinical Tests:   Lab Tests: Ordered and Reviewed  The following lab test(s) were unremarkable: CBC and Urinalysis       <> Summary of Lab: CMP reviewed, sodium noted 128, chloride 76.  Radiological Study: Ordered and Reviewed  ED Management:  Patient seen examined emergency room, urinalysis, IV, 1 L normal saline, CBC and CMP was ordered.    Urinalysis mostly unremarkable, trace blood noted.  CBC CMP mostly unremarkable, except for sodium being 128 chloride being 76.  Patient has already had 1 L normal saline, started 2 L normal saline at 125 ml/hr.  Will get a CT of the head to rule out  other causes of the confusion.    CT report reviewed, no acute abnormalities identified.  Long discussion with grandson is the patient's primary caregiver about current findings.  No UTI, no stroke, sodium was mildly low.  He stated patient does drink a lot of water, has not been eating well past several weeks.  Patient does follow up with the primary care provider monthly secondary to chronic Norco she takes daily for her compression fracture and back.    Family member came to state patient is complaining of severe back pain.  Will give the patient Norco for back pain.    Repeat BMP shows sodium level is now 133.  Will discharge the patient home.  Advised family members to be sure to patient at least 3 meals a day, supplement her meals with boost, ensure at least twice a day.  Follow up with primary care provider in 3 days to repeat labs as I recommended.  Home health may be helpful getting labs.  Patient and family members verbalized understanding treatment plan.                              Clinical Impression:   Final diagnoses:  [M54.50, G89.29] Chronic midline low back pain without sciatica  [Z87.81]  History of vertebral compression fracture  [E87.1] Hyponatremia (Primary)  [R41.0] Chronic confusion        ED Disposition Condition    Discharge Stable          ED Prescriptions    None       Follow-up Information       Follow up With Specialties Details Why Contact Info    [facility] In 3 days If symptoms worsen, As needed, repeat labs [address] [facility] [address] [phone]               Sunny Maradiaga NP  06/22/23 [id number]

## 2023-06-22 NOTE — DISCHARGE INSTRUCTIONS
Continue taking previously prescribed medication as directed by primary care provider.    Increase salt intake, this may help by adding Ensure or boost to diet to 2 3 times a day in conjunction with eating regular meals.    Follow-up primary care provider in 3-5 days to repeat labs as highly recommended.    Return emergency room if patient develops any new or other worsening conditions.

## 2023-06-22 NOTE — ED TRIAGE NOTES
"Patient to triage via wheelchair accompanied by Jhoan escobedo. Patient complains of lower back pain and chills that started yesterday. Patient states, "I think I may have a bladder infection". Gregg reports that the patient woke up yesterday morning more confused than normal and the patient has episode of urine and stool incontinence last night which does happen from time to time, maybe once every three to four months- per gregg. Patient AAOX4 during triage. Rates pain 8/10. Respirations even and unlabored. No distress noted.   "

## 2023-07-12 ENCOUNTER — HOSPITAL ENCOUNTER (OUTPATIENT)
Facility: HOSPITAL | Age: 83
Discharge: ANOTHER HOSPITAL | End: 2023-07-13
Attending: INTERNAL MEDICINE | Admitting: INTERNAL MEDICINE
Payer: MEDICARE

## 2023-07-12 DIAGNOSIS — S27.0XXA TRAUMATIC PNEUMOTHORAX, INITIAL ENCOUNTER: ICD-10-CM

## 2023-07-12 DIAGNOSIS — I21.4 NSTEMI (NON-ST ELEVATED MYOCARDIAL INFARCTION): ICD-10-CM

## 2023-07-12 DIAGNOSIS — J93.9 PNEUMOTHORAX: ICD-10-CM

## 2023-07-12 DIAGNOSIS — R07.9 CHEST PAIN: ICD-10-CM

## 2023-07-12 DIAGNOSIS — S22.41XA CLOSED FRACTURE OF MULTIPLE RIBS OF RIGHT SIDE, INITIAL ENCOUNTER: Primary | ICD-10-CM

## 2023-07-12 DIAGNOSIS — S27.0XXA CLOSED TRAUMATIC FRACTURE OF RIBS OF RIGHT SIDE WITH PNEUMOTHORAX: ICD-10-CM

## 2023-07-12 DIAGNOSIS — S22.41XA CLOSED TRAUMATIC FRACTURE OF RIBS OF RIGHT SIDE WITH PNEUMOTHORAX: ICD-10-CM

## 2023-07-12 DIAGNOSIS — R00.0 TACHYCARDIA: ICD-10-CM

## 2023-07-12 PROBLEM — S27.2XXA TRAUMATIC HEMOPNEUMOTHORAX, INITIAL ENCOUNTER: Status: ACTIVE | Noted: 2023-07-12

## 2023-07-12 PROBLEM — S22.000A COMPRESSION FRACTURE OF BODY OF THORACIC VERTEBRA: Status: ACTIVE | Noted: 2023-07-12

## 2023-07-12 PROBLEM — Z95.0 CARDIAC PACEMAKER IN SITU: Status: ACTIVE | Noted: 2023-07-12

## 2023-07-12 PROBLEM — S20.211A CONTUSION OF CHEST WALL, RIGHT, INITIAL ENCOUNTER: Status: RESOLVED | Noted: 2023-07-12 | Resolved: 2023-07-12

## 2023-07-12 PROBLEM — I10 PRIMARY HYPERTENSION: Status: ACTIVE | Noted: 2023-07-12

## 2023-07-12 PROBLEM — E87.1 HYPONATREMIA: Status: ACTIVE | Noted: 2023-07-12

## 2023-07-12 PROBLEM — S20.211A CONTUSION OF CHEST WALL, RIGHT, INITIAL ENCOUNTER: Status: ACTIVE | Noted: 2023-07-12

## 2023-07-12 PROBLEM — D62 ACUTE POSTHEMORRHAGIC ANEMIA: Status: ACTIVE | Noted: 2023-07-12

## 2023-07-12 PROBLEM — N18.32 CHRONIC KIDNEY DISEASE, STAGE 3B: Status: ACTIVE | Noted: 2023-07-12

## 2023-07-12 PROBLEM — S27.329A: Status: ACTIVE | Noted: 2023-07-12

## 2023-07-12 PROBLEM — R04.89 PULMONARY HEMORRHAGE: Status: ACTIVE | Noted: 2023-07-12

## 2023-07-12 PROBLEM — Z92.29 PERSONAL HISTORY OF LONG-TERM (CURRENT) USE OF ANTICOAGULANTS: Status: ACTIVE | Noted: 2023-07-12

## 2023-07-12 LAB
ALBUMIN SERPL BCP-MCNC: 4.1 G/DL (ref 3.5–5.2)
ALP SERPL-CCNC: 70 U/L (ref 55–135)
ALT SERPL W/O P-5'-P-CCNC: 13 U/L (ref 10–44)
ANION GAP SERPL CALC-SCNC: 12 MMOL/L (ref 8–16)
APTT PPP: <21 SEC (ref 21–32)
AST SERPL-CCNC: 24 U/L (ref 10–40)
BASOPHILS # BLD AUTO: 0.07 K/UL (ref 0–0.2)
BASOPHILS NFR BLD: 0.5 % (ref 0–1.9)
BILIRUB SERPL-MCNC: 0.4 MG/DL (ref 0.1–1)
BUN SERPL-MCNC: 25 MG/DL (ref 8–23)
CALCIUM SERPL-MCNC: 9.5 MG/DL (ref 8.7–10.5)
CHLORIDE SERPL-SCNC: 94 MMOL/L (ref 95–110)
CO2 SERPL-SCNC: 25 MMOL/L (ref 23–29)
CREAT SERPL-MCNC: 1.2 MG/DL (ref 0.5–1.4)
DIFFERENTIAL METHOD: ABNORMAL
EOSINOPHIL # BLD AUTO: 0.2 K/UL (ref 0–0.5)
EOSINOPHIL NFR BLD: 1.4 % (ref 0–8)
ERYTHROCYTE [DISTWIDTH] IN BLOOD BY AUTOMATED COUNT: 11.8 % (ref 11.5–14.5)
EST. GFR  (NO RACE VARIABLE): 44.4 ML/MIN/1.73 M^2
GLUCOSE SERPL-MCNC: 115 MG/DL (ref 70–110)
HCT VFR BLD AUTO: 33.8 % (ref 37–48.5)
HGB BLD-MCNC: 11.7 G/DL (ref 12–16)
IMM GRANULOCYTES # BLD AUTO: 0.1 K/UL (ref 0–0.04)
IMM GRANULOCYTES NFR BLD AUTO: 0.8 % (ref 0–0.5)
INR PPP: 1.1 (ref 0.8–1.2)
LYMPHOCYTES # BLD AUTO: 1.3 K/UL (ref 1–4.8)
LYMPHOCYTES NFR BLD: 10.2 % (ref 18–48)
MCH RBC QN AUTO: 31.5 PG (ref 27–31)
MCHC RBC AUTO-ENTMCNC: 34.6 G/DL (ref 32–36)
MCV RBC AUTO: 91 FL (ref 82–98)
MONOCYTES # BLD AUTO: 0.7 K/UL (ref 0.3–1)
MONOCYTES NFR BLD: 5.1 % (ref 4–15)
NEUTROPHILS # BLD AUTO: 10.8 K/UL (ref 1.8–7.7)
NEUTROPHILS NFR BLD: 82 % (ref 38–73)
NRBC BLD-RTO: 0 /100 WBC
PLATELET # BLD AUTO: 201 K/UL (ref 150–450)
PMV BLD AUTO: 11.8 FL (ref 9.2–12.9)
POTASSIUM SERPL-SCNC: 4.9 MMOL/L (ref 3.5–5.1)
PROT SERPL-MCNC: 6.5 G/DL (ref 6–8.4)
PROTHROMBIN TIME: 11.3 SEC (ref 9–12.5)
RBC # BLD AUTO: 3.71 M/UL (ref 4–5.4)
SODIUM SERPL-SCNC: 131 MMOL/L (ref 136–145)
WBC # BLD AUTO: 13.1 K/UL (ref 3.9–12.7)

## 2023-07-12 PROCEDURE — 99285 EMERGENCY DEPT VISIT HI MDM: CPT | Mod: 25

## 2023-07-12 PROCEDURE — 71250 CT THORAX DX C-: CPT | Mod: TC

## 2023-07-12 PROCEDURE — G0378 HOSPITAL OBSERVATION PER HR: HCPCS

## 2023-07-12 PROCEDURE — 85025 COMPLETE CBC W/AUTO DIFF WBC: CPT | Performed by: NURSE PRACTITIONER

## 2023-07-12 PROCEDURE — 25000003 PHARM REV CODE 250: Performed by: NURSE PRACTITIONER

## 2023-07-12 PROCEDURE — 71250 CT CHEST WITHOUT CONTRAST: ICD-10-PCS | Mod: 26,,, | Performed by: RADIOLOGY

## 2023-07-12 PROCEDURE — 71250 CT THORAX DX C-: CPT | Mod: 26,,, | Performed by: RADIOLOGY

## 2023-07-12 PROCEDURE — 85610 PROTHROMBIN TIME: CPT | Performed by: NURSE PRACTITIONER

## 2023-07-12 PROCEDURE — 85730 THROMBOPLASTIN TIME PARTIAL: CPT | Performed by: NURSE PRACTITIONER

## 2023-07-12 PROCEDURE — 99223 1ST HOSP IP/OBS HIGH 75: CPT | Mod: AI,,, | Performed by: INTERNAL MEDICINE

## 2023-07-12 PROCEDURE — 93010 ELECTROCARDIOGRAM REPORT: CPT | Mod: ,,, | Performed by: INTERNAL MEDICINE

## 2023-07-12 PROCEDURE — 93005 ELECTROCARDIOGRAM TRACING: CPT

## 2023-07-12 PROCEDURE — 93010 EKG 12-LEAD: ICD-10-PCS | Mod: ,,, | Performed by: INTERNAL MEDICINE

## 2023-07-12 PROCEDURE — 27000221 HC OXYGEN, UP TO 24 HOURS

## 2023-07-12 PROCEDURE — 80053 COMPREHEN METABOLIC PANEL: CPT | Performed by: NURSE PRACTITIONER

## 2023-07-12 PROCEDURE — 99223 PR INITIAL HOSPITAL CARE,LEVL III: ICD-10-PCS | Mod: AI,,, | Performed by: INTERNAL MEDICINE

## 2023-07-12 RX ORDER — ONDANSETRON 2 MG/ML
4 INJECTION INTRAMUSCULAR; INTRAVENOUS EVERY 8 HOURS PRN
Status: DISCONTINUED | OUTPATIENT
Start: 2023-07-13 | End: 2023-07-13 | Stop reason: HOSPADM

## 2023-07-12 RX ORDER — OXYCODONE AND ACETAMINOPHEN 10; 325 MG/1; MG/1
1 TABLET ORAL ONCE
Status: COMPLETED | OUTPATIENT
Start: 2023-07-12 | End: 2023-07-12

## 2023-07-12 RX ORDER — HYDROCODONE BITARTRATE AND ACETAMINOPHEN 5; 325 MG/1; MG/1
1 TABLET ORAL EVERY 6 HOURS PRN
Status: DISCONTINUED | OUTPATIENT
Start: 2023-07-13 | End: 2023-07-13 | Stop reason: HOSPADM

## 2023-07-12 RX ORDER — DEXTROSE 40 %
16 GEL (GRAM) ORAL
Status: DISCONTINUED | OUTPATIENT
Start: 2023-07-13 | End: 2023-07-13 | Stop reason: HOSPADM

## 2023-07-12 RX ORDER — ACETAMINOPHEN 325 MG/1
650 TABLET ORAL EVERY 4 HOURS PRN
Status: DISCONTINUED | OUTPATIENT
Start: 2023-07-13 | End: 2023-07-13 | Stop reason: HOSPADM

## 2023-07-12 RX ORDER — TALC
6 POWDER (GRAM) TOPICAL NIGHTLY PRN
Status: DISCONTINUED | OUTPATIENT
Start: 2023-07-13 | End: 2023-07-13 | Stop reason: HOSPADM

## 2023-07-12 RX ORDER — SODIUM CHLORIDE 9 MG/ML
INJECTION, SOLUTION INTRAVENOUS CONTINUOUS
Status: DISCONTINUED | OUTPATIENT
Start: 2023-07-13 | End: 2023-07-13

## 2023-07-12 RX ORDER — ATORVASTATIN CALCIUM 10 MG/1
10 TABLET, FILM COATED ORAL DAILY
Status: DISCONTINUED | OUTPATIENT
Start: 2023-07-13 | End: 2023-07-13 | Stop reason: HOSPADM

## 2023-07-12 RX ORDER — MAG HYDROX/ALUMINUM HYD/SIMETH 200-200-20
30 SUSPENSION, ORAL (FINAL DOSE FORM) ORAL 4 TIMES DAILY PRN
Status: DISCONTINUED | OUTPATIENT
Start: 2023-07-13 | End: 2023-07-13 | Stop reason: HOSPADM

## 2023-07-12 RX ORDER — IPRATROPIUM BROMIDE AND ALBUTEROL SULFATE 2.5; .5 MG/3ML; MG/3ML
3 SOLUTION RESPIRATORY (INHALATION) EVERY 4 HOURS PRN
Status: DISCONTINUED | OUTPATIENT
Start: 2023-07-13 | End: 2023-07-13 | Stop reason: HOSPADM

## 2023-07-12 RX ORDER — AMOXICILLIN 250 MG
1 CAPSULE ORAL 2 TIMES DAILY PRN
Status: DISCONTINUED | OUTPATIENT
Start: 2023-07-13 | End: 2023-07-13 | Stop reason: HOSPADM

## 2023-07-12 RX ORDER — SIMETHICONE 80 MG
1 TABLET,CHEWABLE ORAL 4 TIMES DAILY PRN
Status: DISCONTINUED | OUTPATIENT
Start: 2023-07-13 | End: 2023-07-13 | Stop reason: HOSPADM

## 2023-07-12 RX ORDER — GLUCAGON 1 MG
1 KIT INJECTION
Status: DISCONTINUED | OUTPATIENT
Start: 2023-07-13 | End: 2023-07-13 | Stop reason: HOSPADM

## 2023-07-12 RX ORDER — SODIUM CHLORIDE 0.9 % (FLUSH) 0.9 %
10 SYRINGE (ML) INJECTION EVERY 12 HOURS PRN
Status: DISCONTINUED | OUTPATIENT
Start: 2023-07-13 | End: 2023-07-13 | Stop reason: HOSPADM

## 2023-07-12 RX ORDER — PROCHLORPERAZINE EDISYLATE 5 MG/ML
5 INJECTION INTRAMUSCULAR; INTRAVENOUS EVERY 6 HOURS PRN
Status: DISCONTINUED | OUTPATIENT
Start: 2023-07-13 | End: 2023-07-13 | Stop reason: HOSPADM

## 2023-07-12 RX ORDER — NALOXONE HCL 0.4 MG/ML
0.02 VIAL (ML) INJECTION
Status: DISCONTINUED | OUTPATIENT
Start: 2023-07-13 | End: 2023-07-13 | Stop reason: HOSPADM

## 2023-07-12 RX ORDER — DEXTROSE 40 %
24 GEL (GRAM) ORAL
Status: DISCONTINUED | OUTPATIENT
Start: 2023-07-13 | End: 2023-07-13 | Stop reason: HOSPADM

## 2023-07-12 RX ORDER — POLYETHYLENE GLYCOL 3350 17 G/17G
17 POWDER, FOR SOLUTION ORAL DAILY
Status: DISCONTINUED | OUTPATIENT
Start: 2023-07-13 | End: 2023-07-13 | Stop reason: HOSPADM

## 2023-07-12 RX ADMIN — OXYCODONE HYDROCHLORIDE AND ACETAMINOPHEN 1 TABLET: 10; 325 TABLET ORAL at 08:07

## 2023-07-12 NOTE — Clinical Note
Diagnosis: Closed fracture of multiple ribs of right side, initial encounter [[id number]]   Future Attending Provider: SHEBA BEST [[id number]]   Admitting Provider:: SHEBA BEST [[id number]]

## 2023-07-13 ENCOUNTER — HOSPITAL ENCOUNTER (INPATIENT)
Facility: HOSPITAL | Age: 83
LOS: 12 days | Discharge: NURSING, REHAB OR LONG-TERM CARE | DRG: 964 | End: 2023-07-25
Attending: STUDENT IN AN ORGANIZED HEALTH CARE EDUCATION/TRAINING PROGRAM | Admitting: STUDENT IN AN ORGANIZED HEALTH CARE EDUCATION/TRAINING PROGRAM
Payer: MEDICARE

## 2023-07-13 VITALS
RESPIRATION RATE: 24 BRPM | DIASTOLIC BLOOD PRESSURE: 84 MMHG | OXYGEN SATURATION: 100 % | WEIGHT: 103 LBS | SYSTOLIC BLOOD PRESSURE: 141 MMHG | BODY MASS INDEX: 17.58 KG/M2 | TEMPERATURE: 98 F | HEIGHT: 64 IN | HEART RATE: 121 BPM

## 2023-07-13 DIAGNOSIS — I49.9 CARDIAC RHYTHM DISORDER OR DISTURBANCE OR CHANGE: ICD-10-CM

## 2023-07-13 DIAGNOSIS — S27.2XXA TRAUMATIC HEMOPNEUMOTHORAX, INITIAL ENCOUNTER: Primary | ICD-10-CM

## 2023-07-13 DIAGNOSIS — I21.3 ST ELEVATION MI (STEMI): ICD-10-CM

## 2023-07-13 DIAGNOSIS — J93.9 PNEUMOTHORAX: ICD-10-CM

## 2023-07-13 DIAGNOSIS — R79.89 ELEVATED TROPONIN: ICD-10-CM
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
## 2023-07-13 PROBLEM — S32.592A CLOSED FRACTURE OF RAMUS OF LEFT PUBIS: Status: ACTIVE | Noted: 2023-07-13

## 2023-07-13 LAB
ABO + RH BLD: NORMAL
ABO + RH BLD: NORMAL
ALBUMIN SERPL BCP-MCNC: 3.5 G/DL (ref 3.5–5.2)
ALP SERPL-CCNC: 58 U/L (ref 55–135)
ALT SERPL W/O P-5'-P-CCNC: 13 U/L (ref 10–44)
ANION GAP SERPL CALC-SCNC: 10 MMOL/L (ref 8–16)
ANION GAP SERPL CALC-SCNC: 9 MMOL/L (ref 8–16)
AST SERPL-CCNC: 26 U/L (ref 10–40)
BASOPHILS # BLD AUTO: 0.02 K/UL (ref 0–0.2)
BASOPHILS # BLD AUTO: 0.03 K/UL (ref 0–0.2)
BASOPHILS # BLD AUTO: 0.03 K/UL (ref 0–0.2)
BASOPHILS # BLD AUTO: 0.05 K/UL (ref 0–0.2)
BASOPHILS NFR BLD: 0.2 % (ref 0–1.9)
BASOPHILS NFR BLD: 0.3 % (ref 0–1.9)
BASOPHILS NFR BLD: 0.3 % (ref 0–1.9)
BASOPHILS NFR BLD: 0.4 % (ref 0–1.9)
BILIRUB SERPL-MCNC: 0.9 MG/DL (ref 0.1–1)
BLD GP AB SCN CELLS X3 SERPL QL: NORMAL
BLD GP AB SCN CELLS X3 SERPL QL: NORMAL
BUN SERPL-MCNC: 23 MG/DL (ref 8–23)
BUN SERPL-MCNC: 24 MG/DL (ref 8–23)
CALCIUM SERPL-MCNC: 8.6 MG/DL (ref 8.7–10.5)
CALCIUM SERPL-MCNC: 8.7 MG/DL (ref 8.7–10.5)
CHLORIDE SERPL-SCNC: 100 MMOL/L (ref 95–110)
CHLORIDE SERPL-SCNC: 97 MMOL/L (ref 95–110)
CK SERPL-CCNC: 354 U/L (ref 20–180)
CO2 SERPL-SCNC: 22 MMOL/L (ref 23–29)
CO2 SERPL-SCNC: 24 MMOL/L (ref 23–29)
CREAT SERPL-MCNC: 0.8 MG/DL (ref 0.5–1.4)
CREAT SERPL-MCNC: 0.9 MG/DL (ref 0.5–1.4)
DIFFERENTIAL METHOD: ABNORMAL
EOSINOPHIL # BLD AUTO: 0 K/UL (ref 0–0.5)
EOSINOPHIL NFR BLD: 0 % (ref 0–8)
EOSINOPHIL NFR BLD: 0.2 % (ref 0–8)
EOSINOPHIL NFR BLD: 0.3 % (ref 0–8)
EOSINOPHIL NFR BLD: 0.4 % (ref 0–8)
ERYTHROCYTE [DISTWIDTH] IN BLOOD BY AUTOMATED COUNT: 11.8 % (ref 11.5–14.5)
ERYTHROCYTE [DISTWIDTH] IN BLOOD BY AUTOMATED COUNT: 11.9 % (ref 11.5–14.5)
ERYTHROCYTE [DISTWIDTH] IN BLOOD BY AUTOMATED COUNT: 11.9 % (ref 11.5–14.5)
ERYTHROCYTE [DISTWIDTH] IN BLOOD BY AUTOMATED COUNT: 12 % (ref 11.5–14.5)
EST. GFR  (NO RACE VARIABLE): >60 ML/MIN/1.73 M^2
EST. GFR  (NO RACE VARIABLE): >60 ML/MIN/1.73 M^2
GLUCOSE SERPL-MCNC: 103 MG/DL (ref 70–110)
GLUCOSE SERPL-MCNC: 107 MG/DL (ref 70–110)
HCT VFR BLD AUTO: 23.6 % (ref 37–48.5)
HCT VFR BLD AUTO: 27.7 % (ref 37–48.5)
HCT VFR BLD AUTO: 29.9 % (ref 37–48.5)
HCT VFR BLD AUTO: 30.8 % (ref 37–48.5)
HGB BLD-MCNC: 10.3 G/DL (ref 12–16)
HGB BLD-MCNC: 10.6 G/DL (ref 12–16)
HGB BLD-MCNC: 8 G/DL (ref 12–16)
HGB BLD-MCNC: 9.7 G/DL (ref 12–16)
IMM GRANULOCYTES # BLD AUTO: 0.02 K/UL (ref 0–0.04)
IMM GRANULOCYTES # BLD AUTO: 0.06 K/UL (ref 0–0.04)
IMM GRANULOCYTES NFR BLD AUTO: 0.2 % (ref 0–0.5)
IMM GRANULOCYTES NFR BLD AUTO: 0.5 % (ref 0–0.5)
IMM GRANULOCYTES NFR BLD AUTO: 0.5 % (ref 0–0.5)
IMM GRANULOCYTES NFR BLD AUTO: 0.6 % (ref 0–0.5)
INR PPP: 1 (ref 0.8–1.2)
LACTATE SERPL-SCNC: 2.1 MMOL/L (ref 0.5–2.2)
LYMPHOCYTES # BLD AUTO: 0.6 K/UL (ref 1–4.8)
LYMPHOCYTES # BLD AUTO: 1 K/UL (ref 1–4.8)
LYMPHOCYTES # BLD AUTO: 1.1 K/UL (ref 1–4.8)
LYMPHOCYTES # BLD AUTO: 1.4 K/UL (ref 1–4.8)
LYMPHOCYTES NFR BLD: 10.5 % (ref 18–48)
LYMPHOCYTES NFR BLD: 10.6 % (ref 18–48)
LYMPHOCYTES NFR BLD: 11.1 % (ref 18–48)
LYMPHOCYTES NFR BLD: 4.6 % (ref 18–48)
MAGNESIUM SERPL-MCNC: 1.7 MG/DL (ref 1.6–2.6)
MCH RBC QN AUTO: 31.5 PG (ref 27–31)
MCH RBC QN AUTO: 32 PG (ref 27–31)
MCH RBC QN AUTO: 32.3 PG (ref 27–31)
MCH RBC QN AUTO: 32.4 PG (ref 27–31)
MCHC RBC AUTO-ENTMCNC: 33.9 G/DL (ref 32–36)
MCHC RBC AUTO-ENTMCNC: 34.4 G/DL (ref 32–36)
MCHC RBC AUTO-ENTMCNC: 34.4 G/DL (ref 32–36)
MCHC RBC AUTO-ENTMCNC: 35 G/DL (ref 32–36)
MCV RBC AUTO: 92 FL (ref 82–98)
MCV RBC AUTO: 92 FL (ref 82–98)
MCV RBC AUTO: 93 FL (ref 82–98)
MCV RBC AUTO: 96 FL (ref 82–98)
MONOCYTES # BLD AUTO: 0.4 K/UL (ref 0.3–1)
MONOCYTES # BLD AUTO: 0.7 K/UL (ref 0.3–1)
MONOCYTES # BLD AUTO: 0.7 K/UL (ref 0.3–1)
MONOCYTES # BLD AUTO: 0.8 K/UL (ref 0.3–1)
MONOCYTES NFR BLD: 3.2 % (ref 4–15)
MONOCYTES NFR BLD: 6.1 % (ref 4–15)
MONOCYTES NFR BLD: 6.7 % (ref 4–15)
MONOCYTES NFR BLD: 6.8 % (ref 4–15)
NEUTROPHILS # BLD AUTO: 10.6 K/UL (ref 1.8–7.7)
NEUTROPHILS # BLD AUTO: 11.1 K/UL (ref 1.8–7.7)
NEUTROPHILS # BLD AUTO: 7.9 K/UL (ref 1.8–7.7)
NEUTROPHILS # BLD AUTO: 8.4 K/UL (ref 1.8–7.7)
NEUTROPHILS NFR BLD: 81.3 % (ref 38–73)
NEUTROPHILS NFR BLD: 81.4 % (ref 38–73)
NEUTROPHILS NFR BLD: 82.3 % (ref 38–73)
NEUTROPHILS NFR BLD: 91.5 % (ref 38–73)
NRBC BLD-RTO: 0 /100 WBC
PHOSPHATE SERPL-MCNC: 4.3 MG/DL (ref 2.7–4.5)
PLATELET # BLD AUTO: 174 K/UL (ref 150–450)
PLATELET # BLD AUTO: 181 K/UL (ref 150–450)
PLATELET # BLD AUTO: 183 K/UL (ref 150–450)
PLATELET # BLD AUTO: 209 K/UL (ref 150–450)
PMV BLD AUTO: 11.5 FL (ref 9.2–12.9)
PMV BLD AUTO: 11.6 FL (ref 9.2–12.9)
PMV BLD AUTO: 12.1 FL (ref 9.2–12.9)
PMV BLD AUTO: 12.5 FL (ref 9.2–12.9)
POTASSIUM SERPL-SCNC: 4.3 MMOL/L (ref 3.5–5.1)
POTASSIUM SERPL-SCNC: 5.1 MMOL/L (ref 3.5–5.1)
PROT SERPL-MCNC: 5.6 G/DL (ref 6–8.4)
PROTHROMBIN TIME: 10.9 SEC (ref 9–12.5)
RBC # BLD AUTO: 2.47 M/UL (ref 4–5.4)
RBC # BLD AUTO: 3 M/UL (ref 4–5.4)
RBC # BLD AUTO: 3.22 M/UL (ref 4–5.4)
RBC # BLD AUTO: 3.36 M/UL (ref 4–5.4)
SODIUM SERPL-SCNC: 130 MMOL/L (ref 136–145)
SODIUM SERPL-SCNC: 132 MMOL/L (ref 136–145)
SPECIMEN OUTDATE: NORMAL
SPECIMEN OUTDATE: NORMAL
TROPONIN I SERPL DL<=0.01 NG/ML-MCNC: 0.01 NG/ML (ref 0–0.03)
TROPONIN I SERPL DL<=0.01 NG/ML-MCNC: 0.04 NG/ML (ref 0–0.03)
TROPONIN I SERPL HS-MCNC: 27.9 PG/ML (ref 0–14.9)
WBC # BLD AUTO: 10.29 K/UL (ref 3.9–12.7)
WBC # BLD AUTO: 12.12 K/UL (ref 3.9–12.7)
WBC # BLD AUTO: 12.92 K/UL (ref 3.9–12.7)
WBC # BLD AUTO: 9.75 K/UL (ref 3.9–12.7)

## 2023-07-13 PROCEDURE — 96360 HYDRATION IV INFUSION INIT: CPT

## 2023-07-13 PROCEDURE — 20000000 HC ICU ROOM

## 2023-07-13 PROCEDURE — 84484 ASSAY OF TROPONIN QUANT: CPT | Performed by: STUDENT IN AN ORGANIZED HEALTH CARE EDUCATION/TRAINING PROGRAM

## 2023-07-13 PROCEDURE — 99900031 HC PATIENT EDUCATION (STAT)

## 2023-07-13 PROCEDURE — 84100 ASSAY OF PHOSPHORUS: CPT | Performed by: INTERNAL MEDICINE

## 2023-07-13 PROCEDURE — 99291 PR CRITICAL CARE, E/M 30-74 MINUTES: ICD-10-PCS | Mod: ,,, | Performed by: INTERNAL MEDICINE

## 2023-07-13 PROCEDURE — 63600175 PHARM REV CODE 636 W HCPCS: Performed by: STUDENT IN AN ORGANIZED HEALTH CARE EDUCATION/TRAINING PROGRAM

## 2023-07-13 PROCEDURE — 36415 COLL VENOUS BLD VENIPUNCTURE: CPT | Performed by: STUDENT IN AN ORGANIZED HEALTH CARE EDUCATION/TRAINING PROGRAM

## 2023-07-13 PROCEDURE — 83735 ASSAY OF MAGNESIUM: CPT | Performed by: INTERNAL MEDICINE

## 2023-07-13 PROCEDURE — 93005 ELECTROCARDIOGRAM TRACING: CPT

## 2023-07-13 PROCEDURE — 94799 UNLISTED PULMONARY SVC/PX: CPT

## 2023-07-13 PROCEDURE — 99900035 HC TECH TIME PER 15 MIN (STAT)

## 2023-07-13 PROCEDURE — 25000003 PHARM REV CODE 250: Performed by: INTERNAL MEDICINE

## 2023-07-13 PROCEDURE — 86900 BLOOD TYPING SEROLOGIC ABO: CPT | Mod: 91 | Performed by: STUDENT IN AN ORGANIZED HEALTH CARE EDUCATION/TRAINING PROGRAM

## 2023-07-13 PROCEDURE — 83605 ASSAY OF LACTIC ACID: CPT | Performed by: STUDENT IN AN ORGANIZED HEALTH CARE EDUCATION/TRAINING PROGRAM

## 2023-07-13 PROCEDURE — 96361 HYDRATE IV INFUSION ADD-ON: CPT

## 2023-07-13 PROCEDURE — 85610 PROTHROMBIN TIME: CPT | Performed by: STUDENT IN AN ORGANIZED HEALTH CARE EDUCATION/TRAINING PROGRAM

## 2023-07-13 PROCEDURE — 80053 COMPREHEN METABOLIC PANEL: CPT | Performed by: STUDENT IN AN ORGANIZED HEALTH CARE EDUCATION/TRAINING PROGRAM

## 2023-07-13 PROCEDURE — 99291 CRITICAL CARE FIRST HOUR: CPT | Mod: ,,, | Performed by: INTERNAL MEDICINE

## 2023-07-13 PROCEDURE — 85025 COMPLETE CBC W/AUTO DIFF WBC: CPT | Mod: 91 | Performed by: STUDENT IN AN ORGANIZED HEALTH CARE EDUCATION/TRAINING PROGRAM

## 2023-07-13 PROCEDURE — 85025 COMPLETE CBC W/AUTO DIFF WBC: CPT | Performed by: INTERNAL MEDICINE

## 2023-07-13 PROCEDURE — 93010 EKG 12-LEAD: ICD-10-PCS | Mod: 76,,, | Performed by: INTERNAL MEDICINE

## 2023-07-13 PROCEDURE — 80048 BASIC METABOLIC PNL TOTAL CA: CPT | Performed by: INTERNAL MEDICINE

## 2023-07-13 PROCEDURE — 86920 COMPATIBILITY TEST SPIN: CPT | Performed by: INTERNAL MEDICINE

## 2023-07-13 PROCEDURE — 94761 N-INVAS EAR/PLS OXIMETRY MLT: CPT

## 2023-07-13 PROCEDURE — 84484 ASSAY OF TROPONIN QUANT: CPT | Mod: 91 | Performed by: STUDENT IN AN ORGANIZED HEALTH CARE EDUCATION/TRAINING PROGRAM

## 2023-07-13 PROCEDURE — G0378 HOSPITAL OBSERVATION PER HR: HCPCS

## 2023-07-13 PROCEDURE — 27000221 HC OXYGEN, UP TO 24 HOURS

## 2023-07-13 PROCEDURE — 36415 COLL VENOUS BLD VENIPUNCTURE: CPT | Performed by: INTERNAL MEDICINE

## 2023-07-13 PROCEDURE — 86900 BLOOD TYPING SEROLOGIC ABO: CPT | Performed by: INTERNAL MEDICINE

## 2023-07-13 PROCEDURE — 82550 ASSAY OF CK (CPK): CPT | Performed by: INTERNAL MEDICINE

## 2023-07-13 PROCEDURE — 93010 ELECTROCARDIOGRAM REPORT: CPT | Mod: 76,,, | Performed by: INTERNAL MEDICINE

## 2023-07-13 RX ORDER — CALCIUM GLUCONATE 20 MG/ML
2 INJECTION, SOLUTION INTRAVENOUS
Status: DISCONTINUED | OUTPATIENT
Start: 2023-07-13 | End: 2023-07-14

## 2023-07-13 RX ORDER — MAGNESIUM SULFATE HEPTAHYDRATE 40 MG/ML
2 INJECTION, SOLUTION INTRAVENOUS
Status: DISCONTINUED | OUTPATIENT
Start: 2023-07-13 | End: 2023-07-14

## 2023-07-13 RX ORDER — POTASSIUM CHLORIDE 7.45 MG/ML
80 INJECTION INTRAVENOUS
Status: DISCONTINUED | OUTPATIENT
Start: 2023-07-13 | End: 2023-07-14

## 2023-07-13 RX ORDER — ONDANSETRON 2 MG/ML
4 INJECTION INTRAMUSCULAR; INTRAVENOUS EVERY 8 HOURS PRN
Status: DISCONTINUED | OUTPATIENT
Start: 2023-07-13 | End: 2023-07-25 | Stop reason: HOSPADM

## 2023-07-13 RX ORDER — SODIUM CHLORIDE 0.9 % (FLUSH) 0.9 %
10 SYRINGE (ML) INJECTION
Status: DISCONTINUED | OUTPATIENT
Start: 2023-07-13 | End: 2023-07-25 | Stop reason: HOSPADM

## 2023-07-13 RX ORDER — MORPHINE SULFATE 2 MG/ML
2 INJECTION, SOLUTION INTRAMUSCULAR; INTRAVENOUS EVERY 4 HOURS PRN
Status: DISCONTINUED | OUTPATIENT
Start: 2023-07-13 | End: 2023-07-25 | Stop reason: HOSPADM

## 2023-07-13 RX ORDER — HYDROCODONE BITARTRATE AND ACETAMINOPHEN 500; 5 MG/1; MG/1
TABLET ORAL
Status: DISCONTINUED | OUTPATIENT
Start: 2023-07-13 | End: 2023-07-14

## 2023-07-13 RX ORDER — LIDOCAINE HYDROCHLORIDE AND EPINEPHRINE 10; 10 MG/ML; UG/ML
10 INJECTION, SOLUTION INFILTRATION; PERINEURAL ONCE
Status: DISCONTINUED | OUTPATIENT
Start: 2023-07-13 | End: 2023-07-14

## 2023-07-13 RX ORDER — ATORVASTATIN CALCIUM 10 MG/1
10 TABLET, FILM COATED ORAL DAILY
Status: DISCONTINUED | OUTPATIENT
Start: 2023-07-14 | End: 2023-07-25 | Stop reason: HOSPADM

## 2023-07-13 RX ORDER — PROCHLORPERAZINE EDISYLATE 5 MG/ML
5 INJECTION INTRAMUSCULAR; INTRAVENOUS EVERY 6 HOURS PRN
Status: DISCONTINUED | OUTPATIENT
Start: 2023-07-13 | End: 2023-07-25 | Stop reason: HOSPADM

## 2023-07-13 RX ORDER — POTASSIUM CHLORIDE 7.45 MG/ML
40 INJECTION INTRAVENOUS
Status: DISCONTINUED | OUTPATIENT
Start: 2023-07-13 | End: 2023-07-14

## 2023-07-13 RX ORDER — POTASSIUM CHLORIDE 7.45 MG/ML
60 INJECTION INTRAVENOUS
Status: DISCONTINUED | OUTPATIENT
Start: 2023-07-13 | End: 2023-07-14

## 2023-07-13 RX ORDER — CALCIUM GLUCONATE 20 MG/ML
1 INJECTION, SOLUTION INTRAVENOUS
Status: DISCONTINUED | OUTPATIENT
Start: 2023-07-13 | End: 2023-07-14

## 2023-07-13 RX ORDER — SODIUM CHLORIDE, SODIUM LACTATE, POTASSIUM CHLORIDE, CALCIUM CHLORIDE 600; 310; 30; 20 MG/100ML; MG/100ML; MG/100ML; MG/100ML
INJECTION, SOLUTION INTRAVENOUS CONTINUOUS
Status: DISCONTINUED | OUTPATIENT
Start: 2023-07-13 | End: 2023-07-13 | Stop reason: HOSPADM

## 2023-07-13 RX ORDER — MAGNESIUM SULFATE HEPTAHYDRATE 40 MG/ML
4 INJECTION, SOLUTION INTRAVENOUS
Status: DISCONTINUED | OUTPATIENT
Start: 2023-07-13 | End: 2023-07-14

## 2023-07-13 RX ORDER — DILTIAZEM HYDROCHLORIDE 120 MG/1
120 CAPSULE, COATED, EXTENDED RELEASE ORAL DAILY
Status: DISCONTINUED | OUTPATIENT
Start: 2023-07-14 | End: 2023-07-25 | Stop reason: HOSPADM

## 2023-07-13 RX ORDER — MUPIROCIN 20 MG/G
OINTMENT TOPICAL 2 TIMES DAILY
Status: DISPENSED | OUTPATIENT
Start: 2023-07-13 | End: 2023-07-18

## 2023-07-13 RX ORDER — CALCIUM GLUCONATE 20 MG/ML
3 INJECTION, SOLUTION INTRAVENOUS
Status: DISCONTINUED | OUTPATIENT
Start: 2023-07-13 | End: 2023-07-14

## 2023-07-13 RX ADMIN — HYDROCODONE BITARTRATE AND ACETAMINOPHEN 1 TABLET: 5; 325 TABLET ORAL at 02:07

## 2023-07-13 RX ADMIN — MORPHINE SULFATE 2 MG: 2 INJECTION, SOLUTION INTRAMUSCULAR; INTRAVENOUS at 05:07

## 2023-07-13 RX ADMIN — ONDANSETRON 4 MG: 2 INJECTION INTRAMUSCULAR; INTRAVENOUS at 05:07

## 2023-07-13 RX ADMIN — SODIUM CHLORIDE, POTASSIUM CHLORIDE, SODIUM LACTATE AND CALCIUM CHLORIDE: 600; 310; 30; 20 INJECTION, SOLUTION INTRAVENOUS at 08:07

## 2023-07-13 RX ADMIN — SODIUM CHLORIDE: 9 INJECTION, SOLUTION INTRAVENOUS at 12:07

## 2023-07-13 NOTE — ASSESSMENT & PLAN NOTE
No signs or symptoms of flail chest  Monitor closely on tele and continuous pulse ox  General Surgery is aware  Acute fractures of the right posterior 8th and 9th ribs

## 2023-07-13 NOTE — PLAN OF CARE
Problem: Gas Exchange Impaired  Goal: Optimal Gas Exchange  Outcome: Ongoing, Progressing  Intervention: Optimize Oxygenation and Ventilation  Flowsheets (Taken 7/13/2023 0822)  Airway/Ventilation Management: airway patency maintained  Head of Bed (HOB) Positioning: HOB elevated     Problem: Respiratory Compromise (Pneumothorax)  Goal: Optimal Oxygenation and Ventilation  Outcome: Ongoing, Progressing  Intervention: Manage Pneumothorax Effects  Flowsheets (Taken 7/13/2023 0822)  Airway/Ventilation Management: airway patency maintained   Patient in no apparent distress. Sat's  99 % on 2 lpm. Prn treatments not needed . Will continue to monitor.

## 2023-07-13 NOTE — HPI
Patient is an 83-year-old female with a history of AFib, hypertension, hyperlipidemia, scoliosis with multiple chronic spinal degenerative changes with chronic back pain, history of pelvic fracture and hip fracture in the past who presents after tripping and falling at home.  She was recently replaced on Eliquis by her cardiologist due to episodes of AFib.  She tripped and fell at home onto her right side and developed severe pain in her side.  She went to the ER at [place] and was evaluated and found to have rib fractures at level 8 and 9 in addition to what appeared to be multiple chronic spinal compression fractures on CT imaging.  CT of the chest reveals hemopneumothorax with small apical pneumothorax.  She was admitted there and monitored with repeat CT scan today showing enlarging hemopneumothorax.    Additionally, she was noted to have EKG changes with mildly elevated troponin though her troponin level trended down.  She is not having any chest pain.  She had an episode of hypotension that improved with IV fluids.    She was transferred per  transfer to [facility] for further evaluation.  EN route, per Pulmonary request the patient was redirected to the ER for further evaluation and chest tube placement.    On arrival in the ED:  The patient's vital signs are stable.  She is mildly tachycardic with heart rate in the low 100s.  She is comfortable and easily communicating.  She says she has back pain that is similar to her chronic back pain.  She has mild right rib pain.  Her pelvis is without pain at this time.  She has no other complaints.  She is breathing easily.  I discussed with the ED provider and Dr. Baker has graciously assisted with placement of chest tube.  Additionally, Dr Baker discussed with Dr Weiss who will consult for any potential need of surgical involvement and will assist with management of the chest tube.  I discussed with Dr. Carrasco and with Dr. Dhaliwal from the  Pulmonary team regarding further management.  The patient will be moved to the ICU and will complete trauma workup with scans once chest tube is placed.    See referring provider note below.    83-year-old woman with PMH HTN, TIA (on Eliquis), pacemaker, and osteoporosis adm to  on 07/12 after falling and hitting her right side on her wheelchair.  Patient said that she tripped.  There was no LOC and she did not hit her head.  ROS pos for right-sided chest wall pain.       On admission /93, HR 95.  Exam was pos for bruising on the right chest and arms and impaired recent memory.  Labs:  WBC 13.1, Hb 11.7, Na 131, K 4.9, Cr 1.2.      CT chest WO contrast pos for acute fractures of right ribs 8 -9 with small pneumothorax and extensive subcutaneous emphysema.  Small right hemothorax, right lower lung contusion/hematoma.  Also,, extensive compression deformities throughout the thoracic spine of uncertain chronicity.      EKG NSR notable for small T-wave inversion V2-4 which were not present on an earlier EKG (03/2020).      Patient became hypotensive this morning on her way to radiology for a repeat CT chest.  BP improved when patient placed in Trendelenburg and given IVF.      Repeat ECG  (829 h) pos for deep T-wave inversion in V2-6 which was not present on the earlier ECG. Troponin 0.037 > 0.013.      CT chest today pos for interval increase in right pleural effusion/ hemothorax with no significant change in small pneumothorax.  Also, increasing atelectasis of the right lower lobe obscuring the previously described hematoma/ contusion.  Also, mild atelectasis on the left.       Patient complained of left hip pain this morning.  X-ray pos for left superior pubic ramus fracture of uncertain chronicity possibly acute.  also,, old intertrochanteric left hip fracture s/p ORIF.  Severe bony demineralization and degenerative changes noted.     VS (12 18 h) /62, , RR 15, SpO2 100% (2 L).  Labs WBC 13.1 >  10.2, Hb 11.7 > 9.7, Na 131> 132, Cr 1.2 > 0.8     Transfer requested for higher level of care.  Transfer diagnosis multiple right-sided rib fractures, right pulmonary contusion, right apical pneumothorax (small), rt > lt pleural effusion, left pubic ramus fracture of uncertain chronicity, EKG changes concerning for ischemia

## 2023-07-13 NOTE — ASSESSMENT & PLAN NOTE
Patient with fall and multiple closed rib fractures  Pain control  Follow-up trauma CT scans to rule out additional occult fracture  Incentive spirometry  Oxygen

## 2023-07-13 NOTE — SUBJECTIVE & OBJECTIVE
Past Medical History:   Diagnosis Date    Anxiety     Chronic neck pain     Depression     Hypertension     Osteoporosis     Seasonal allergies     TIA (transient ischemic attack)        Past Surgical History:   Procedure Laterality Date    HYSTERECTOMY      INSERTION OF PACEMAKER      NASAL POLYP SURGERY Bilateral     NECK SURGERY         Review of patient's allergies indicates:   Allergen Reactions    Penicillins Hives       Current Facility-Administered Medications on File Prior to Encounter   Medication    [COMPLETED] oxyCODONE-acetaminophen  mg per tablet 1 tablet    [DISCONTINUED] 0.9%  NaCl infusion    [DISCONTINUED] acetaminophen tablet 650 mg    [DISCONTINUED] albuterol-ipratropium 2.5 mg-0.5 mg/3 mL nebulizer solution 3 mL    [DISCONTINUED] aluminum-magnesium hydroxide-simethicone 200-200-20 mg/5 mL suspension 30 mL    [DISCONTINUED] atorvastatin tablet 10 mg    [DISCONTINUED] dextrose 10% bolus 125 mL 125 mL    [DISCONTINUED] dextrose 10% bolus 250 mL 250 mL    [DISCONTINUED] dextrose 40 % gel 16,000 mg    [DISCONTINUED] dextrose 40 % gel 24,000 mg    [DISCONTINUED] glucagon (human recombinant) injection 1 mg    [DISCONTINUED] HYDROcodone-acetaminophen 5-325 mg per tablet 1 tablet    [DISCONTINUED] lactated ringers bolus 1,000 mL    [DISCONTINUED] lactated ringers infusion    [DISCONTINUED] melatonin tablet 6 mg    [DISCONTINUED] naloxone 0.4 mg/mL injection 0.02 mg    [DISCONTINUED] ondansetron injection 4 mg    [DISCONTINUED] polyethylene glycol packet 17 g    [DISCONTINUED] prochlorperazine injection Soln 5 mg    [DISCONTINUED] senna-docusate 8.6-50 mg per tablet 1 tablet    [DISCONTINUED] simethicone chewable tablet 80 mg    [DISCONTINUED] sodium chloride 0.9% flush 10 mL    [DISCONTINUED] trazodone split tablet 50 mg     Current Outpatient Medications on File Prior to Encounter   Medication Sig    aspirin (ECOTRIN) 81 MG EC tablet Take 81 mg by mouth once daily.    atorvastatin (LIPITOR) 10 MG  tablet Take 10 mg by mouth once daily.    baclofen (LIORESAL) 10 MG tablet Take 10 mg by mouth 2 (two) times daily. prn    budesonide (PULMICORT) 0.5 mg/2 mL nebulizer solution Take 2 mLs (0.5 mg total) by nebulization 2 (two) times daily. For use in saline irrigation as directed.    celecoxib (CELEBREX) 100 MG capsule Take 100 mg by mouth 2 (two) times daily.    clobetasoL (TEMOVATE) 0.05 % cream Apply topically every evening. X 2 weeks, then as needed.    cloNIDine (CATAPRES) 0.1 MG tablet Take 0.1 mg by mouth once daily.    diltiaZEM HCl 120 mg Cp12 Take 1 capsule by mouth every 12 (twelve) hours.    estradioL (ESTRACE) 0.01 % (0.1 mg/gram) vaginal cream Apply 1 gram vaginally daily for two weeks and then twice weekly    FOLIC ACID/MULTIVIT-MIN/LUTEIN (CENTRUM SILVER ORAL) Take by mouth.    HYDROcodone-acetaminophen (NORCO) 5-325 mg per tablet Take 1 tablet by mouth every 6 (six) hours as needed for Pain.    HYDROcodone-acetaminophen (NORCO) 7.5-325 mg per tablet Take 1 tablet by mouth 2 (two) times daily.    lisinopriL 10 MG tablet Take 10 mg by mouth.    rivaroxaban (XARELTO) 10 mg Tab Take 10 mg by mouth daily with dinner or evening meal.    traZODone (DESYREL) 50 MG tablet Take 50 mg by mouth every evening.     Family History       Problem Relation (Age of Onset)    Cancer Mother    Stomach cancer Mother          Tobacco Use    Smoking status: Never    Smokeless tobacco: Never   Substance and Sexual Activity    Alcohol use: No     Alcohol/week: 0.0 standard drinks    Drug use: No    Sexual activity: Not Currently     Review of Systems   All other systems reviewed and are negative.  Objective:     Vital Signs (Most Recent):  Temp: 97.6 °F (36.4 °C) (07/13/23 1620)  Pulse: (!) 111 (07/13/23 1714)  Resp: (!) 25 (07/13/23 1724)  BP: 130/66 (07/13/23 1714)  SpO2: 99 % (07/13/23 1714) Vital Signs (24h Range):  Temp:  [97.6 °F (36.4 °C)-97.7 °F (36.5 °C)] 97.6 °F (36.4 °C)  Pulse:  [] 111  Resp:  [10-33]  25  SpO2:  [93 %-100 %] 99 %  BP: ()/(29-93) 130/66        There is no height or weight on file to calculate BMI.     Physical Exam  Constitutional:       Appearance: Normal appearance. She is normal weight.   HENT:      Head: Normocephalic and atraumatic.      Nose: Nose normal.      Mouth/Throat:      Mouth: Mucous membranes are moist.   Eyes:      Conjunctiva/sclera: Conjunctivae normal.   Cardiovascular:      Rate and Rhythm: Regular rhythm. Tachycardia present.      Pulses: Normal pulses.      Heart sounds: Normal heart sounds. No murmur heard.    No friction rub. No gallop.   Pulmonary:      Effort: Pulmonary effort is normal.      Breath sounds: Normal breath sounds. No wheezing or rales.   Abdominal:      General: Abdomen is flat. Bowel sounds are normal. There is no distension.      Palpations: Abdomen is soft.      Tenderness: There is no abdominal tenderness. There is no guarding.   Musculoskeletal:         General: Tenderness present. No swelling. Normal range of motion.      Cervical back: Normal range of motion and neck supple.      Right lower leg: No edema.      Left lower leg: No edema.   Skin:     General: Skin is warm and dry.   Neurological:      General: No focal deficit present.      Mental Status: She is alert.   Psychiatric:         Mood and Affect: Mood normal.         Thought Content: Thought content normal.         Judgment: Judgment normal.              Significant Labs: All pertinent labs within the past 24 hours have been reviewed.    Significant Imaging: I have reviewed all pertinent imaging results/findings within the past 24 hours.

## 2023-07-13 NOTE — ASSESSMENT & PLAN NOTE
Left pubic ramus fracture difficult to tell if this is old or acute per x-ray read  Will obtain CT of the pelvis for further evaluation  Likely nonoperative  Ortho consult pending CT

## 2023-07-13 NOTE — SUBJECTIVE & OBJECTIVE
Past Medical History:   Diagnosis Date    Anxiety     Chronic neck pain     Depression     Hypertension     Osteoporosis     Seasonal allergies     TIA (transient ischemic attack)        Past Surgical History:   Procedure Laterality Date    HYSTERECTOMY      INSERTION OF PACEMAKER      NASAL POLYP SURGERY Bilateral     NECK SURGERY         Review of patient's allergies indicates:   Allergen Reactions    Penicillins Hives       No current facility-administered medications on file prior to encounter.     Current Outpatient Medications on File Prior to Encounter   Medication Sig    aspirin (ECOTRIN) 81 MG EC tablet Take 81 mg by mouth once daily.    atorvastatin (LIPITOR) 10 MG tablet Take 10 mg by mouth once daily.    baclofen (LIORESAL) 10 MG tablet Take 10 mg by mouth 2 (two) times daily. prn    budesonide (PULMICORT) 0.5 mg/2 mL nebulizer solution Take 2 mLs (0.5 mg total) by nebulization 2 (two) times daily. For use in saline irrigation as directed.    celecoxib (CELEBREX) 100 MG capsule Take 100 mg by mouth 2 (two) times daily.    clobetasoL (TEMOVATE) 0.05 % cream Apply topically every evening. X 2 weeks, then as needed.    cloNIDine (CATAPRES) 0.1 MG tablet Take 0.1 mg by mouth once daily.    diltiaZEM HCl 120 mg Cp12 Take 1 capsule by mouth every 12 (twelve) hours.    estradioL (ESTRACE) 0.01 % (0.1 mg/gram) vaginal cream Apply 1 gram vaginally daily for two weeks and then twice weekly    FOLIC ACID/MULTIVIT-MIN/LUTEIN (CENTRUM SILVER ORAL) Take by mouth.    HYDROcodone-acetaminophen (NORCO) 5-325 mg per tablet Take 1 tablet by mouth every 6 (six) hours as needed for Pain.    HYDROcodone-acetaminophen (NORCO) 7.5-325 mg per tablet Take 1 tablet by mouth 2 (two) times daily.    lisinopriL 10 MG tablet Take 10 mg by mouth.    rivaroxaban (XARELTO) 10 mg Tab Take 10 mg by mouth daily with dinner or evening meal.    traZODone (DESYREL) 50 MG tablet Take 50 mg by mouth every evening.     Family History        Problem Relation (Age of Onset)    Cancer Mother    Stomach cancer Mother          Tobacco Use    Smoking status: Never    Smokeless tobacco: Never   Substance and Sexual Activity    Alcohol use: No     Alcohol/week: 0.0 standard drinks    Drug use: No    Sexual activity: Not Currently     Review of Systems   Constitutional:  Positive for activity change. Negative for fatigue.   HENT:  Negative for congestion.    Eyes:  Negative for visual disturbance.   Respiratory:  Negative for shortness of breath.    Cardiovascular:  Positive for chest pain.   Gastrointestinal:  Negative for nausea and vomiting.   Endocrine: Positive for cold intolerance.   Genitourinary:  Negative for dysuria.   Musculoskeletal:  Positive for arthralgias and back pain.   Skin:  Positive for color change.   Allergic/Immunologic: Negative for immunocompromised state.   Neurological:  Negative for headaches.   Hematological:  Bruises/bleeds easily.   Psychiatric/Behavioral:  Positive for confusion and decreased concentration.    Objective:     Vital Signs (Most Recent):  Temp: 97.7 °F (36.5 °C) (07/12/23 1949)  Pulse: 95 (07/12/23 1949)  Resp: 18 (07/12/23 2004)  BP: (!) 155/93 (07/12/23 1949)  SpO2: 97 % (07/12/23 1949) Vital Signs (24h Range):  Temp:  [97.7 °F (36.5 °C)] 97.7 °F (36.5 °C)  Pulse:  [95] 95  Resp:  [18-19] 18  SpO2:  [97 %] 97 %  BP: (155)/(93) 155/93     Weight: 46.7 kg (103 lb)  Body mass index is 17.68 kg/m².     Physical Exam  Constitutional:       General: She is not in acute distress.     Appearance: She is underweight. She is not ill-appearing, toxic-appearing or diaphoretic.   HENT:      Head: Normocephalic and atraumatic.   Pulmonary:      Effort: No tachypnea or bradypnea.   Chest:      Comments: Left upper chest pacemaker noted.  I see no bruising to her chest on the camera as best I can tell.  Genitourinary:     Comments: No tenorio in place  Skin:     Comments: Natasha's purpura to arms   Neurological:      Mental  Status: She is alert and oriented to person, place, and time.      GCS: GCS eye subscore is 4. GCS verbal subscore is 5. GCS motor subscore is 6.   Psychiatric:         Attention and Perception: Attention and perception normal.         Behavior: Behavior is cooperative.         Cognition and Memory: Cognition is impaired. Memory is not impaired. She exhibits impaired recent memory. She does not exhibit impaired remote memory.              Significant Labs: All pertinent labs within the past 24 hours have been reviewed.  Recent Results (from the past 24 hour(s))   CBC auto differential    Collection Time: 07/12/23  9:26 PM   Result Value Ref Range    WBC 13.10 (H) 3.90 - 12.70 K/uL    RBC 3.71 (L) 4.00 - 5.40 M/uL    Hemoglobin 11.7 (L) 12.0 - 16.0 g/dL    Hematocrit 33.8 (L) 37.0 - 48.5 %    MCV 91 82 - 98 fL    MCH 31.5 (H) 27.0 - 31.0 pg    MCHC 34.6 32.0 - 36.0 g/dL    RDW 11.8 11.5 - 14.5 %    Platelets 201 150 - 450 K/uL    MPV 11.8 9.2 - 12.9 fL    Immature Granulocytes 0.8 (H) 0.0 - 0.5 %    Gran # (ANC) 10.8 (H) 1.8 - 7.7 K/uL    Immature Grans (Abs) 0.10 (H) 0.00 - 0.04 K/uL    Lymph # 1.3 1.0 - 4.8 K/uL    Mono # 0.7 0.3 - 1.0 K/uL    Eos # 0.2 0.0 - 0.5 K/uL    Baso # 0.07 0.00 - 0.20 K/uL    nRBC 0 0 /100 WBC    Gran % 82.0 (H) 38.0 - 73.0 %    Lymph % 10.2 (L) 18.0 - 48.0 %    Mono % 5.1 4.0 - 15.0 %    Eosinophil % 1.4 0.0 - 8.0 %    Basophil % 0.5 0.0 - 1.9 %    Differential Method Automated    Comprehensive metabolic panel    Collection Time: 07/12/23  9:26 PM   Result Value Ref Range    Sodium 131 (L) 136 - 145 mmol/L    Potassium 4.9 3.5 - 5.1 mmol/L    Chloride 94 (L) 95 - 110 mmol/L    CO2 25 23 - 29 mmol/L    Glucose 115 (H) 70 - 110 mg/dL    BUN 25 (H) 8 - 23 mg/dL    Creatinine 1.2 0.5 - 1.4 mg/dL    Calcium 9.5 8.7 - 10.5 mg/dL    Total Protein 6.5 6.0 - 8.4 g/dL    Albumin 4.1 3.5 - 5.2 g/dL    Total Bilirubin 0.4 0.1 - 1.0 mg/dL    Alkaline Phosphatase 70 55 - 135 U/L    AST 24 10 - 40 U/L     ALT 13 10 - 44 U/L    eGFR 44.4 (A) >60 mL/min/1.73 m^2    Anion Gap 12 8 - 16 mmol/L   Protime-INR    Collection Time: 07/12/23  9:26 PM   Result Value Ref Range    Prothrombin Time 11.3 9.0 - 12.5 sec    INR 1.1 0.8 - 1.2   APTT    Collection Time: 07/12/23  9:26 PM   Result Value Ref Range    aPTT <21.0 21.0 - 32.0 sec         Significant Imaging: I have reviewed all pertinent imaging results/findings within the past 24 hours.

## 2023-07-13 NOTE — ED NOTES
Pt here for ground level fall at home.  Pt fell and landed on wheel chair at home.  Pt lost balance and fell backwards in to chair.  Abrasion noted to the back.  Pt appears uncomfortable.  No other concerns voiced at this time.  Pt does report taking eliquis.  NAD noted.

## 2023-07-13 NOTE — ASSESSMENT & PLAN NOTE
She has a poor memory of the day's events   -She denies CP and thinks she landed on her left side  Given the below sequelae of the contusion, she is an extremely high risk patient   -Low threshold for moving to MICU  Dr. Laguerre discussed the case with Gen Surgery on call, Dr. Allen, who is aware of the patient  Amazingly, she looks quite well at this time with normal respiration and O2 sats on RA of 97%

## 2023-07-13 NOTE — HPI
"Ms. Guallpa is an 82yo lady with a past medical history of anxiety, depression, HTN, and TIA.  She has a pacemaker in situ, but she cannot say why it's there.  Diann is also in her med list, but she cannot remember any of the medications she takes.  When asked about blood thinners, she states, "I think I might have been on one.  I don't know; my granddaughter usually answers all these questions."    Her son was with her originally on arrival, but has now gone.  The patient tells me that she was standing up trying to get some shoes out of a box and lost her balance.  She fell to the floor striking her left side.  She denies striking her head or having any LOC.  She crawled to the bed and called her grandson.  Family told the triage nurse she fell out of a wheelchair and that she is on Eliquis.     She adamantly denies having any current chest pain or recent chest pain from the fall, though the ED documented she was having right sided chest pain at 9/10 on arrival - so I am concerned her memory for the exact events is poor.     In the ED her VS were BP (!) 155/93 (BP Location: Left arm, Patient Position: Sitting)   Pulse 95   Temp 97.7 °F (36.5 °C)   Resp 18   Ht 5' 4" (1.626 m)   Wt 46.7 kg (103 lb)   SpO2 97% RA  Breastfeeding No   BMI 17.68 kg/m².  Labs showed WBC 13, hg 11.7, Na 131, Cr 1.2.    CT chest showed 1. Acute fractures of the right posterior 8th and 9th ribs with resultant small right pneumothorax, hemothorax, right lower lobe intraparenchymal hematoma, surrounding pulmonary contusion/hemorrhage, and right chest wall emphysema.  2. Multilevel mild to severe compression fractures throughout most of the thoracic spine. Age of these fractures is indeterminate by CT. Correlate with clinical findings. If additional evaluation is clinically warranted in this patient with a pacemaker/ICD, nonemergent bone scan can be considered.    EKG showed sinus with PVC's, rate 86, flipped T's inferior and " anterior, QTc 433 ms.     In the ED she was treated with:  Medications   oxyCODONE-acetaminophen  mg per tablet 1 tablet (1 tablet Oral Given 7/12/23 2004)

## 2023-07-13 NOTE — H&P
"[facility] Medicine  History & Physical    Patient Name: Alvina Guallpa  MRN: [id number]  Admission Date: 7/12/2023  Attending Physician: Alexis Nation MD   Primary Care Provider: EDY Hurd         Patient information was obtained from patient, past medical records and ER records.       Subjective:     Principal Problem:Contusion of lung, closed, initial encounter    Chief Complaint:   Chief Complaint   Patient presents with    Fall     Patient sustained a ground level fall and hit her right side on her wheelchair. Patient states she tripped on a shoe. No LOC, patient is on eliquis.         HPI:   Ms. Guallpa is an 84yo lady with a past medical history of anxiety, depression, HTN, and TIA.  She has a pacemaker in situ, but she cannot say why it's there.  Diann is also in her med list, but she cannot remember any of the medications she takes.  When asked about blood thinners, she states, "I think I might have been on one.  I don't know; my granddaughter usually answers all these questions."    Her son was with her originally on arrival, but has now gone.  The patient tells me that she was standing up trying to get some shoes out of a box and lost her balance.  She fell to the floor striking her left side.  She denies striking her head or having any LOC.  She crawled to the bed and called her grandson.  Family told the triage nurse she fell out of a wheelchair and that she is on Eliquis.     She adamantly denies having any current chest pain or recent chest pain from the fall, though the ED documented she was having right sided chest pain at 9/10 on arrival - so I am concerned her memory for the exact events is poor.     In the ED her VS were BP (!) 155/93 (BP Location: Left arm, Patient Position: Sitting)   Pulse 95   Temp 97.7 °F (36.5 °C)   Resp 18   Ht 5' 4" (1.626 m)   Wt 46.7 kg (103 lb)   SpO2 97% RA  Breastfeeding No   BMI 17.68 kg/m².  Labs showed WBC " 13, hg 11.7, Na 131, Cr 1.2.    CT chest showed 1. Acute fractures of the right posterior 8th and 9th ribs with resultant small right pneumothorax, hemothorax, right lower lobe intraparenchymal hematoma, surrounding pulmonary contusion/hemorrhage, and right chest wall emphysema.  2. Multilevel mild to severe compression fractures throughout most of the thoracic spine. Age of these fractures is indeterminate by CT. Correlate with clinical findings. If additional evaluation is clinically warranted in this patient with a pacemaker/ICD, nonemergent bone scan can be considered.    EKG showed sinus with PVC's, rate 86, flipped T's inferior and anterior, QTc 433 ms.     In the ED she was treated with:  Medications   oxyCODONE-acetaminophen  mg per tablet 1 tablet (1 tablet Oral Given 7/12/23 2004)               Past Medical History:   Diagnosis Date    Anxiety     Chronic neck pain     Depression     Hypertension     Osteoporosis     Seasonal allergies     TIA (transient ischemic attack)        Past Surgical History:   Procedure Laterality Date    HYSTERECTOMY      INSERTION OF PACEMAKER      NASAL POLYP SURGERY Bilateral     NECK SURGERY         Review of patient's allergies indicates:   Allergen Reactions    Penicillins Hives       No current facility-administered medications on file prior to encounter.     Current Outpatient Medications on File Prior to Encounter   Medication Sig    aspirin (ECOTRIN) 81 MG EC tablet Take 81 mg by mouth once daily.    atorvastatin (LIPITOR) 10 MG tablet Take 10 mg by mouth once daily.    baclofen (LIORESAL) 10 MG tablet Take 10 mg by mouth 2 (two) times daily. prn    budesonide (PULMICORT) 0.5 mg/2 mL nebulizer solution Take 2 mLs (0.5 mg total) by nebulization 2 (two) times daily. For use in saline irrigation as directed.    celecoxib (CELEBREX) 100 MG capsule Take 100 mg by mouth 2 (two) times daily.    clobetasoL (TEMOVATE) 0.05 % cream Apply topically every  evening. X 2 weeks, then as needed.    cloNIDine (CATAPRES) 0.1 MG tablet Take 0.1 mg by mouth once daily.    diltiaZEM HCl 120 mg Cp12 Take 1 capsule by mouth every 12 (twelve) hours.    estradioL (ESTRACE) 0.01 % (0.1 mg/gram) vaginal cream Apply 1 gram vaginally daily for two weeks and then twice weekly    FOLIC ACID/MULTIVIT-MIN/LUTEIN (CENTRUM SILVER ORAL) Take by mouth.    HYDROcodone-acetaminophen (NORCO) 5-325 mg per tablet Take 1 tablet by mouth every 6 (six) hours as needed for Pain.    HYDROcodone-acetaminophen (NORCO) 7.5-325 mg per tablet Take 1 tablet by mouth 2 (two) times daily.    lisinopriL 10 MG tablet Take 10 mg by mouth.    rivaroxaban (XARELTO) 10 mg Tab Take 10 mg by mouth daily with dinner or evening meal.    traZODone (DESYREL) 50 MG tablet Take 50 mg by mouth every evening.     Family History       Problem Relation (Age of Onset)    Cancer Mother    Stomach cancer Mother          Tobacco Use    Smoking status: Never    Smokeless tobacco: Never   Substance and Sexual Activity    Alcohol use: No     Alcohol/week: 0.0 standard drinks    Drug use: No    Sexual activity: Not Currently     Review of Systems   Constitutional:  Positive for activity change. Negative for fatigue.   HENT:  Negative for congestion.    Eyes:  Negative for visual disturbance.   Respiratory:  Negative for shortness of breath.    Cardiovascular:  Positive for chest pain.   Gastrointestinal:  Negative for nausea and vomiting.   Endocrine: Positive for cold intolerance.   Genitourinary:  Negative for dysuria.   Musculoskeletal:  Positive for arthralgias and back pain.   Skin:  Positive for color change.   Allergic/Immunologic: Negative for immunocompromised state.   Neurological:  Negative for headaches.   Hematological:  Bruises/bleeds easily.   Psychiatric/Behavioral:  Positive for confusion and decreased concentration.    Objective:     Vital Signs (Most Recent):  Temp: 97.7 °F (36.5 °C) (07/12/23  1949)  Pulse: 95 (07/12/23 1949)  Resp: 18 (07/12/23 2004)  BP: (!) 155/93 (07/12/23 1949)  SpO2: 97 % (07/12/23 1949) Vital Signs (24h Range):  Temp:  [97.7 °F (36.5 °C)] 97.7 °F (36.5 °C)  Pulse:  [95] 95  Resp:  [18-19] 18  SpO2:  [97 %] 97 %  BP: (155)/(93) 155/93     Weight: 46.7 kg (103 lb)  Body mass index is 17.68 kg/m².     Physical Exam  Constitutional:       General: She is not in acute distress.     Appearance: She is underweight. She is not ill-appearing, toxic-appearing or diaphoretic.   HENT:      Head: Normocephalic and atraumatic.   Pulmonary:      Effort: No tachypnea or bradypnea.   Chest:      Comments: Left upper chest pacemaker noted.  I see no bruising to her chest on the camera as best I can tell.  Genitourinary:     Comments: No tenorio in place  Skin:     Comments: Natasha's purpura to arms   Neurological:      Mental Status: She is alert and oriented to person, place, and time.      GCS: GCS eye subscore is 4. GCS verbal subscore is 5. GCS motor subscore is 6.   Psychiatric:         Attention and Perception: Attention and perception normal.         Behavior: Behavior is cooperative.         Cognition and Memory: Cognition is impaired. Memory is not impaired. She exhibits impaired recent memory. She does not exhibit impaired remote memory.              Significant Labs: All pertinent labs within the past 24 hours have been reviewed.  Recent Results (from the past 24 hour(s))   CBC auto differential    Collection Time: 07/12/23  9:26 PM   Result Value Ref Range    WBC 13.10 (H) 3.90 - 12.70 K/uL    RBC 3.71 (L) 4.00 - 5.40 M/uL    Hemoglobin 11.7 (L) 12.0 - 16.0 g/dL    Hematocrit 33.8 (L) 37.0 - 48.5 %    MCV 91 82 - 98 fL    MCH 31.5 (H) 27.0 - 31.0 pg    MCHC 34.6 32.0 - 36.0 g/dL    RDW 11.8 11.5 - 14.5 %    Platelets 201 150 - 450 K/uL    MPV 11.8 9.2 - 12.9 fL    Immature Granulocytes 0.8 (H) 0.0 - 0.5 %    Gran # (ANC) 10.8 (H) 1.8 - 7.7 K/uL    Immature Grans (Abs) 0.10 (H) 0.00 - 0.04  K/uL    Lymph # 1.3 1.0 - 4.8 K/uL    Mono # 0.7 0.3 - 1.0 K/uL    Eos # 0.2 0.0 - 0.5 K/uL    Baso # 0.07 0.00 - 0.20 K/uL    nRBC 0 0 /100 WBC    Gran % 82.0 (H) 38.0 - 73.0 %    Lymph % 10.2 (L) 18.0 - 48.0 %    Mono % 5.1 4.0 - 15.0 %    Eosinophil % 1.4 0.0 - 8.0 %    Basophil % 0.5 0.0 - 1.9 %    Differential Method Automated    Comprehensive metabolic panel    Collection Time: 07/12/23  9:26 PM   Result Value Ref Range    Sodium 131 (L) 136 - 145 mmol/L    Potassium 4.9 3.5 - 5.1 mmol/L    Chloride 94 (L) 95 - 110 mmol/L    CO2 25 23 - 29 mmol/L    Glucose 115 (H) 70 - 110 mg/dL    BUN 25 (H) 8 - 23 mg/dL    Creatinine 1.2 0.5 - 1.4 mg/dL    Calcium 9.5 8.7 - 10.5 mg/dL    Total Protein 6.5 6.0 - 8.4 g/dL    Albumin 4.1 3.5 - 5.2 g/dL    Total Bilirubin 0.4 0.1 - 1.0 mg/dL    Alkaline Phosphatase 70 55 - 135 U/L    AST 24 10 - 40 U/L    ALT 13 10 - 44 U/L    eGFR 44.4 (A) >60 mL/min/1.73 m^2    Anion Gap 12 8 - 16 mmol/L   Protime-INR    Collection Time: 07/12/23  9:26 PM   Result Value Ref Range    Prothrombin Time 11.3 9.0 - 12.5 sec    INR 1.1 0.8 - 1.2   APTT    Collection Time: 07/12/23  9:26 PM   Result Value Ref Range    aPTT <21.0 21.0 - 32.0 sec         Significant Imaging: I have reviewed all pertinent imaging results/findings within the past 24 hours.    Assessment/Plan:     * Contusion of lung, closed, initial encounter  She has a poor memory of the day's events   -She denies CP and thinks she landed on her left side  Given the below sequelae of the contusion, she is an extremely high risk patient   -Low threshold for moving to MICU  Dr. Laguerre discussed the case with Gen Surgery on call, Dr. Allen, who is aware of the patient  Amazingly, she looks quite well at this time with normal respiration and O2 sats on RA of 97%           Traumatic hemopneumothorax, initial encounter  Follow up CXR in am, PA and lateral   On O2  High risk patient for decompensation        Multiple closed fractures of ribs  of right side  No signs or symptoms of flail chest  Monitor closely on tele and continuous pulse ox  General Surgery is aware  Acute fractures of the right posterior 8th and 9th ribs       Pulmonary hemorrhage  She has a pulmonary contusion with hemorrhage on Xarelto and ASA  Type and screen ordered  Serial CBC q4  Low threshold for moving to ICU with any signs of decompensation  Avoiding all anticoagulants      Acute posthemorrhagic anemia  CBC q4 hours     Latest Reference Range & Units 08/19/19 16:00 06/22/23 15:16 07/12/23 21:26   Hemoglobin 12.0 - 16.0 g/dL 13.5 13.6 11.7 (L)          Personal history of long-term (current) use of anticoagulants  Per records, she takes the Xarelto qpm, so that means she has been 24 hours without it, hopefully  Holding all anticoagulants and anti-PLT meds      Cardiac pacemaker in situ  The pacemaker is visible and has no signs of trauma on camera  I'm not sure why this is there per records  Given anticoagulation, high suspicion for PAfib in past      Primary hypertension  Holding all anti-HTN meds for now with pulmonary hemorrhage:   -Lisinopril 10mg qday   -Diltiazem 120mg po BID      Chronic kidney disease, stage 3b  Stable to monitor     Latest Reference Range & Units 06/22/23 15:16 06/22/23 17:34 07/12/23 21:26   Creatinine 0.5 - 1.4 mg/dL 0.9 0.8 1.2         Hyponatremia  NS at 100 cc/hr x 1L      Compression fracture of body of thoracic vertebra  She denies all back pain  Suspect all is chronic  Consider bone scan if worse in am  No MRI with pacer        VTE Risk Mitigation (From admission, onward)         Ordered     IP VTE HIGH RISK PATIENT  Once         07/12/23 2321     Place sequential compression device  Until discontinued         07/12/23 2321     Place NELL hose  Until discontinued         07/12/23 2321     Reason for No Pharmacological VTE Prophylaxis  Once        Question:  Reasons:  Answer:  Active Bleeding    07/12/23 2321                     The attending  portion of this evaluation, treatment, and documentation was performed per LAUREN Flores MD via Telemedicine AudioVisual using the secure "[employer]" software platform with 2 way audio/video. The provider was located off-site and the patient is located in the hospital. The aforementioned video software was utilized to document the relevant history and physical exam    On 07/12/2023, patient should be placed in hospital observation services under my care.        LAUREN Flores MD  Department of Hospital Medicine   [place] - Emergency Dept

## 2023-07-13 NOTE — PROVIDER TRANSFER
Outside Transfer Acceptance Note / Regional Referral Center    Referring facility: [facility]   Referring provider: WILLIAM REGALADO  Accepting facility: [facility]  Accepting provider: SNEHAL KELLEY  Reason for transfer: Higher Level of Care   Transfer diagnosis: Hemo/Pneumothorax  Transfer specialty requested: Cardiothoracic Surgery  Transfer level: NUMBER 1-5: 2  Bed type requested: ICU  Isolation status: No active isolations   Admission class or status: IP- Inpatient    Narrative     83-year-old woman with PMH HTN, TIA (on Eliquis), pacemaker, and osteoporosis adm to  on 07/12 after falling and hitting her right side on her wheelchair.  Patient said that she tripped.  There was no LOC and she did not hit her head.  ROS pos for right-sided chest wall pain.      On admission /93, HR 95.  Exam was pos for bruising on the right chest and arms and impaired recent memory.  Labs:  WBC 13.1, Hb 11.7, Na 131, K 4.9, Cr 1.2.     CT chest WO contrast pos for acute fractures of right ribs 8 -9 with small pneumothorax and extensive subcutaneous emphysema.  Small right hemothorax, right lower lung contusion/hematoma.  Also,, extensive compression deformities throughout the thoracic spine of uncertain chronicity.     EKG NSR notable for small T-wave inversion V2-4 which were not present on an earlier EKG (03/2020).     Patient became hypotensive this morning on her way to radiology for a repeat CT chest.  BP improved when patient placed in Trendelenburg and given IVF.     Repeat ECG  (829 h) pos for deep T-wave inversion in V2-6 which was not present on the earlier ECG. Troponin 0.037 > 0.013.     CT chest today pos for interval increase in right pleural effusion/ hemothorax with no significant change in small pneumothorax.  Also, increasing atelectasis of the right lower lobe obscuring the previously described hematoma/ contusion.  Also, mild atelectasis on the left.      Patient  complained of left hip pain this morning.  X-ray pos for left superior pubic ramus fracture of uncertain chronicity possibly acute.  also,, old intertrochanteric left hip fracture s/p ORIF.  Severe bony demineralization and degenerative changes noted.    VS (12 18 h) /62, , RR 15, SpO2 100% (2 L).  Labs WBC 13.1 > 10.2, Hb 11.7 > 9.7, Na 131> 132, Cr 1.2 > 0.8    Transfer requested for higher level of care.  Transfer diagnosis multiple right-sided rib fractures, right pulmonary contusion, right apical pneumothorax (small), rt > lt pleural effusion, left pubic ramus fracture of uncertain chronicity, EKG changes concerning for ischemia      Instructions      [facility]  Admit to Hospital Medicine  Upon patient arrival to floor, please contact Hospital Medicine on call.

## 2023-07-13 NOTE — H&P
[facility] Hospital Medicine  History & Physical    Patient Name: Alvina Guallpa  MRN: [id number]  Patient Class: IP- Inpatient  Admission Date: 7/13/2023  Attending Physician: Armaan Sahu MD   Primary Care Provider: EDY Hurd         Patient information was obtained from patient and ER records.     Subjective:     Principal Problem:Traumatic hemopneumothorax, initial encounter    Chief Complaint:   Chief Complaint   Patient presents with    Fall        HPI: Patient is an 83-year-old female with a history of AFib, hypertension, hyperlipidemia, scoliosis with multiple chronic spinal degenerative changes with chronic back pain, history of pelvic fracture and hip fracture in the past who presents after tripping and falling at home.  She was recently replaced on Eliquis by her cardiologist due to episodes of AFib.  She tripped and fell at home onto her right side and developed severe pain in her side.  She went to the ER at [facility] and was evaluated and found to have rib fractures at level 8 and 9 in addition to what appeared to be multiple chronic spinal compression fractures on CT imaging.  CT of the chest reveals hemopneumothorax with small apical pneumothorax.  She was admitted there and monitored with repeat CT scan today showing enlarging hemopneumothorax.    Additionally, she was noted to have EKG changes with mildly elevated troponin though her troponin level trended down.  She is not having any chest pain.  She had an episode of hypotension that improved with IV fluids.    She was transferred per  transfer to [facility] for further evaluation.  EN route, per Pulmonary request the patient was redirected to the ER for further evaluation and chest tube placement.    On arrival in the ED:  The patient's vital signs are stable.  She is mildly tachycardic with heart rate in the low 100s.  She is comfortable and easily communicating.  She says she has back pain that is  similar to her chronic back pain.  She has mild right rib pain.  Her pelvis is without pain at this time.  She has no other complaints.  She is breathing easily.  I discussed with the ED provider and Dr. Baker has graciously assisted with placement of chest tube.  Additionally, Dr Baker discussed with Dr Weiss who will consult for any potential need of surgical involvement and will assist with management of the chest tube.  I discussed with Dr. Carrasco and with Dr. Dhaliwal from the Pulmonary team regarding further management.  The patient will be moved to the ICU and will complete trauma workup with scans once chest tube is placed.    See referring provider note below.    83-year-old woman with PMH HTN, TIA (on Eliquis), pacemaker, and osteoporosis adm to  on 07/12 after falling and hitting her right side on her wheelchair.  Patient said that she tripped.  There was no LOC and she did not hit her head.  ROS pos for right-sided chest wall pain.       On admission /93, HR 95.  Exam was pos for bruising on the right chest and arms and impaired recent memory.  Labs:  WBC 13.1, Hb 11.7, Na 131, K 4.9, Cr 1.2.      CT chest WO contrast pos for acute fractures of right ribs 8 -9 with small pneumothorax and extensive subcutaneous emphysema.  Small right hemothorax, right lower lung contusion/hematoma.  Also,, extensive compression deformities throughout the thoracic spine of uncertain chronicity.      EKG NSR notable for small T-wave inversion V2-4 which were not present on an earlier EKG (03/2020).      Patient became hypotensive this morning on her way to radiology for a repeat CT chest.  BP improved when patient placed in Trendelenburg and given IVF.      Repeat ECG  (829 h) pos for deep T-wave inversion in V2-6 which was not present on the earlier ECG. Troponin 0.037 > 0.013.      CT chest today pos for interval increase in right pleural effusion/ hemothorax with no significant change in small  pneumothorax.  Also, increasing atelectasis of the right lower lobe obscuring the previously described hematoma/ contusion.  Also, mild atelectasis on the left.       Patient complained of left hip pain this morning.  X-ray pos for left superior pubic ramus fracture of uncertain chronicity possibly acute.  also,, old intertrochanteric left hip fracture s/p ORIF.  Severe bony demineralization and degenerative changes noted.     VS (12 18 h) /62, , RR 15, SpO2 100% (2 L).  Labs WBC 13.1 > 10.2, Hb 11.7 > 9.7, Na 131> 132, Cr 1.2 > 0.8     Transfer requested for higher level of care.  Transfer diagnosis multiple right-sided rib fractures, right pulmonary contusion, right apical pneumothorax (small), rt > lt pleural effusion, left pubic ramus fracture of uncertain chronicity, EKG changes concerning for ischemia      Past Medical History:   Diagnosis Date    Anxiety     Chronic neck pain     Depression     Hypertension     Osteoporosis     Seasonal allergies     TIA (transient ischemic attack)        Past Surgical History:   Procedure Laterality Date    HYSTERECTOMY      INSERTION OF PACEMAKER      NASAL POLYP SURGERY Bilateral     NECK SURGERY         Review of patient's allergies indicates:   Allergen Reactions    Penicillins Hives       Current Facility-Administered Medications on File Prior to Encounter   Medication    [COMPLETED] oxyCODONE-acetaminophen  mg per tablet 1 tablet    [DISCONTINUED] 0.9%  NaCl infusion    [DISCONTINUED] acetaminophen tablet 650 mg    [DISCONTINUED] albuterol-ipratropium 2.5 mg-0.5 mg/3 mL nebulizer solution 3 mL    [DISCONTINUED] aluminum-magnesium hydroxide-simethicone 200-200-20 mg/5 mL suspension 30 mL    [DISCONTINUED] atorvastatin tablet 10 mg    [DISCONTINUED] dextrose 10% bolus 125 mL 125 mL    [DISCONTINUED] dextrose 10% bolus 250 mL 250 mL    [DISCONTINUED] dextrose 40 % gel 16,000 mg    [DISCONTINUED] dextrose 40 % gel 24,000 mg    [DISCONTINUED] glucagon  (human recombinant) injection 1 mg    [DISCONTINUED] HYDROcodone-acetaminophen 5-325 mg per tablet 1 tablet    [DISCONTINUED] lactated ringers bolus 1,000 mL    [DISCONTINUED] lactated ringers infusion    [DISCONTINUED] melatonin tablet 6 mg    [DISCONTINUED] naloxone 0.4 mg/mL injection 0.02 mg    [DISCONTINUED] ondansetron injection 4 mg    [DISCONTINUED] polyethylene glycol packet 17 g    [DISCONTINUED] prochlorperazine injection Soln 5 mg    [DISCONTINUED] senna-docusate 8.6-50 mg per tablet 1 tablet    [DISCONTINUED] simethicone chewable tablet 80 mg    [DISCONTINUED] sodium chloride 0.9% flush 10 mL    [DISCONTINUED] trazodone split tablet 50 mg     Current Outpatient Medications on File Prior to Encounter   Medication Sig    aspirin (ECOTRIN) 81 MG EC tablet Take 81 mg by mouth once daily.    atorvastatin (LIPITOR) 10 MG tablet Take 10 mg by mouth once daily.    baclofen (LIORESAL) 10 MG tablet Take 10 mg by mouth 2 (two) times daily. prn    budesonide (PULMICORT) 0.5 mg/2 mL nebulizer solution Take 2 mLs (0.5 mg total) by nebulization 2 (two) times daily. For use in saline irrigation as directed.    celecoxib (CELEBREX) 100 MG capsule Take 100 mg by mouth 2 (two) times daily.    clobetasoL (TEMOVATE) 0.05 % cream Apply topically every evening. X 2 weeks, then as needed.    cloNIDine (CATAPRES) 0.1 MG tablet Take 0.1 mg by mouth once daily.    diltiaZEM HCl 120 mg Cp12 Take 1 capsule by mouth every 12 (twelve) hours.    estradioL (ESTRACE) 0.01 % (0.1 mg/gram) vaginal cream Apply 1 gram vaginally daily for two weeks and then twice weekly    FOLIC ACID/MULTIVIT-MIN/LUTEIN (CENTRUM SILVER ORAL) Take by mouth.    HYDROcodone-acetaminophen (NORCO) 5-325 mg per tablet Take 1 tablet by mouth every 6 (six) hours as needed for Pain.    HYDROcodone-acetaminophen (NORCO) 7.5-325 mg per tablet Take 1 tablet by mouth 2 (two) times daily.    lisinopriL 10 MG tablet Take 10 mg by mouth.    rivaroxaban (XARELTO) 10 mg Tab  Take 10 mg by mouth daily with dinner or evening meal.    traZODone (DESYREL) 50 MG tablet Take 50 mg by mouth every evening.     Family History       Problem Relation (Age of Onset)    Cancer Mother    Stomach cancer Mother          Tobacco Use    Smoking status: Never    Smokeless tobacco: Never   Substance and Sexual Activity    Alcohol use: No     Alcohol/week: 0.0 standard drinks    Drug use: No    Sexual activity: Not Currently     Review of Systems   All other systems reviewed and are negative.  Objective:     Vital Signs (Most Recent):  Temp: 97.6 °F (36.4 °C) (07/13/23 1620)  Pulse: (!) 111 (07/13/23 1714)  Resp: (!) 25 (07/13/23 1724)  BP: 130/66 (07/13/23 1714)  SpO2: 99 % (07/13/23 1714) Vital Signs (24h Range):  Temp:  [97.6 °F (36.4 °C)-97.7 °F (36.5 °C)] 97.6 °F (36.4 °C)  Pulse:  [] 111  Resp:  [10-33] 25  SpO2:  [93 %-100 %] 99 %  BP: ()/(29-93) 130/66        There is no height or weight on file to calculate BMI.     Physical Exam  Constitutional:       Appearance: Normal appearance. She is normal weight.   HENT:      Head: Normocephalic and atraumatic.      Nose: Nose normal.      Mouth/Throat:      Mouth: Mucous membranes are moist.   Eyes:      Conjunctiva/sclera: Conjunctivae normal.   Cardiovascular:      Rate and Rhythm: Regular rhythm. Tachycardia present.      Pulses: Normal pulses.      Heart sounds: Normal heart sounds. No murmur heard.    No friction rub. No gallop.   Pulmonary:      Effort: Pulmonary effort is normal.      Breath sounds: Normal breath sounds. No wheezing or rales.   Abdominal:      General: Abdomen is flat. Bowel sounds are normal. There is no distension.      Palpations: Abdomen is soft.      Tenderness: There is no abdominal tenderness. There is no guarding.   Musculoskeletal:         General: Tenderness present. No swelling. Normal range of motion.      Cervical back: Normal range of motion and neck supple.      Right lower leg: No edema.      Left lower  leg: No edema.   Skin:     General: Skin is warm and dry.   Neurological:      General: No focal deficit present.      Mental Status: She is alert.   Psychiatric:         Mood and Affect: Mood normal.         Thought Content: Thought content normal.         Judgment: Judgment normal.              Significant Labs: All pertinent labs within the past 24 hours have been reviewed.    Significant Imaging: I have reviewed all pertinent imaging results/findings within the past 24 hours.    Assessment/Plan:     * Traumatic hemopneumothorax, initial encounter  Patient presents with traumatic hemopneumothorax  Chest tube placed by Dr. Baker in the ED, thank you  Stat CBC  FFP given that she is on Eliquis.  Last dose 24 hours ago.  Trend CBC  Monitor vital signs  CT surgery consulted Dr. Weiss will see  Pulmonary consulted and will follow  Follow-up chest x-ray postprocedure and in the morning      Troponin level elevated  Elevated troponin level the trended downward at the outside facility  EKG with nonspecific changes  Repeat EKG and troponin here  Cardiac monitoring  Hold aspirin for now  Continue statin      Fall  Appears to have had a mechanical fall  Obtain trauma CT scans  CT head pending  Does not appear to have any head injuries and no neurological deficits  Is on Eliquis      Closed fracture of ramus of left pubis  Left pubic ramus fracture difficult to tell if this is old or acute per x-ray read  Will obtain CT of the pelvis for further evaluation  Likely nonoperative  Ortho consult pending CT      Compression fracture of body of thoracic vertebra  She has what appears to be chronic degenerative changes of thoracic spine including compression fractures  Dedicated CT of the C-spine, thoracic spine, lumbar spine given trauma  She says her pain is similar to her chronic pain  Pain control      Contusion of lung, closed, initial encounter  Contusion due to traumatic rib fractures and resulting in  hemopneumothorax      Acute posthemorrhagic anemia  Trend CBC  Monitor chest tube output        Primary hypertension  Holding home blood pressure medications for now      Cardiac pacemaker in situ  Noted      Multiple closed fractures of ribs of right side  Patient with fall and multiple closed rib fractures  Pain control  Follow-up trauma CT scans to rule out additional occult fracture  Incentive spirometry  Oxygen        VTE Risk Mitigation (From admission, onward)           Ordered     IP VTE HIGH RISK PATIENT  Once         07/13/23 1650     Place sequential compression device  Until discontinued         07/13/23 1650                               Armaan Sahu MD  Department of Hospital Medicine  [facility]

## 2023-07-13 NOTE — ASSESSMENT & PLAN NOTE
Patient presents with traumatic hemopneumothorax  Chest tube placed by Dr. Baker in the ED, thank you  Stat CBC  FFP given that she is on Eliquis.  Last dose 24 hours ago.  Trend CBC  Monitor vital signs  CT surgery consulted Dr. Weiss will see  Pulmonary consulted and will follow  Follow-up chest x-ray postprocedure and in the morning

## 2023-07-13 NOTE — ASSESSMENT & PLAN NOTE
Stable to monitor     Latest Reference Range & Units 06/22/23 15:16 06/22/23 17:34 07/12/23 21:26   Creatinine 0.5 - 1.4 mg/dL 0.9 0.8 1.2

## 2023-07-13 NOTE — PLAN OF CARE
[facility] - Emergency Dept  Discharge Final Note    Primary Care Provider: EDY Hurd    Expected Discharge Date:       Per notes patient transferred to [facility] for upgrade in care.  Final Discharge Note (most recent)       Final Note - 07/13/23 1549          Final Note    Assessment Type Final Discharge Note     Anticipated Discharge Disposition Short Term Hospital     What phone number can be called within the next 1-3 days to see how you are doing after discharge? [phone]        Post-Acute Status    Discharge Delays None known at this time                     Important Message from Medicare  Important Message from Medicare regarding Discharge Appeal Rights: Explained to patient/caregiver, Signed/date by patient/caregiver     Date IMM was signed: 07/13/23  Time IMM was signed: 1150

## 2023-07-13 NOTE — ED NOTES
Pt had witnessed syncopal episode after getting out of bed and into a wheelchair.  Pt verbalized she felt like she was going to pass out immediately after sitting in the wheelchair. Pt then went unresponsive for about 10-30 seconds with hypotension and tachycardia noted on the monitor.  ml fluid bolus initiated.  Pt VS returned to normotensive within 3-5 minutes of episode.  RN remained at Pt side throughout episode and Pt was physically lifted and returned to bed using 2 persons lift.   Dr. Nation contacted immediately after episode and advised he would put in new orders.  RN will continue to monitor Pt closely.

## 2023-07-13 NOTE — ASSESSMENT & PLAN NOTE
Holding all anti-HTN meds for now with pulmonary hemorrhage:   -Lisinopril 10mg qday   -Diltiazem 120mg po BID

## 2023-07-13 NOTE — PLAN OF CARE
Problem: Adult Inpatient Plan of Care  Goal: Plan of Care Review  Outcome: Ongoing, Progressing  Goal: Patient-Specific Goal (Individualized)  Outcome: Ongoing, Progressing  Goal: Absence of Hospital-Acquired Illness or Injury  Outcome: Ongoing, Progressing  Goal: Optimal Comfort and Wellbeing  Outcome: Ongoing, Progressing  Goal: Readiness for Transition of Care  Outcome: Ongoing, Progressing     Problem: Fall Injury Risk  Goal: Absence of Fall and Fall-Related Injury  Outcome: Ongoing, Progressing     Problem: Gas Exchange Impaired  Goal: Optimal Gas Exchange  Outcome: Ongoing, Progressing     Problem: Respiratory Compromise (Pneumothorax)  Goal: Optimal Oxygenation and Ventilation  Outcome: Ongoing, Progressing

## 2023-07-13 NOTE — ED NOTES
Pt urinated around external catheter so clothes were changed with clean gown and diaper applied with purewick repositioned.  Family at bedside.

## 2023-07-13 NOTE — ASSESSMENT & PLAN NOTE
Mildly elevated. Stable. EKG shows TWIs at lateral leads.  -Continue telemetry  -Trend troponin  -Consider Cardiology consultation

## 2023-07-13 NOTE — ED PROVIDER NOTES
Encounter Date: 7/12/2023       History     Chief Complaint   Patient presents with    Fall     Patient sustained a ground level fall and hit her right side on her wheelchair. Patient states she tripped on a shoe. No LOC, patient is on eliquis.      83-year-old female who lost her balance and hit the right side of her chest on her wheelchair just PTA.  She denies loss of consciousness or any other pain or injury.  Pain is 9/10.  She does take Norco 7.5 mg twice daily for pain.      Review of patient's allergies indicates:   Allergen Reactions    Penicillins Hives     Past Medical History:   Diagnosis Date    Anxiety     Chronic neck pain     Depression     Hypertension     Osteoporosis     Seasonal allergies     TIA (transient ischemic attack)      Past Surgical History:   Procedure Laterality Date    HYSTERECTOMY      INSERTION OF PACEMAKER      NASAL POLYP SURGERY Bilateral     NECK SURGERY       Family History   Problem Relation Age of Onset    Cancer Mother     Stomach cancer Mother      Social History     Tobacco Use    Smoking status: Never    Smokeless tobacco: Never   Substance Use Topics    Alcohol use: No     Alcohol/week: 0.0 standard drinks    Drug use: No     Review of Systems   Constitutional:  Negative for fever.   HENT:  Negative for sore throat.    Respiratory:  Negative for shortness of breath.    Cardiovascular:  Positive for chest pain.        + right sided pain from hitting wheelchair   Gastrointestinal:  Negative for nausea.   Genitourinary:  Negative for dysuria.   Musculoskeletal:  Negative for back pain.   Skin:  Negative for rash.   Neurological:  Negative for weakness.   Hematological:  Does not bruise/bleed easily.     Physical Exam     Initial Vitals [07/12/23 1949]   BP Pulse Resp Temp SpO2   (!) 155/93 95 19 97.7 °F (36.5 °C) 97 %      MAP       --         Physical Exam    Nursing note and vitals reviewed.  Constitutional: She appears well-developed and well-nourished.   HENT:   Head:  Normocephalic and atraumatic.   Eyes: Conjunctivae and EOM are normal. Pupils are equal, round, and reactive to light.   Neck: Neck supple.   Normal range of motion.  Cardiovascular:  Normal rate and regular rhythm.           Pulmonary/Chest: Breath sounds normal. No respiratory distress. She has no wheezes. She exhibits tenderness.   Right lateral chest wall tenderness, no crepitus or obvious deformity   Abdominal: Abdomen is soft. Bowel sounds are normal. There is no abdominal tenderness. There is no rebound and no guarding.   Musculoskeletal:         General: No tenderness. Normal range of motion.      Cervical back: Normal range of motion and neck supple.     Neurological: She is alert and oriented to person, place, and time. She has normal strength. No cranial nerve deficit.   Skin: Skin is warm and dry.   Psychiatric: She has a normal mood and affect.       ED Course   Procedures  Labs Reviewed   CBC W/ AUTO DIFFERENTIAL - Abnormal; Notable for the following components:       Result Value    WBC 13.10 (*)     RBC 3.71 (*)     Hemoglobin 11.7 (*)     Hematocrit 33.8 (*)     MCH 31.5 (*)     Immature Granulocytes 0.8 (*)     Gran # (ANC) 10.8 (*)     Immature Grans (Abs) 0.10 (*)     Gran % 82.0 (*)     Lymph % 10.2 (*)     All other components within normal limits   COMPREHENSIVE METABOLIC PANEL   PROTIME-INR   APTT          Imaging Results              CT Chest Without Contrast (In process)                      Medications   oxyCODONE-acetaminophen  mg per tablet 1 tablet (1 tablet Oral Given 7/12/23 2004)     Medical Decision Making:   Initial Assessment:   83-year-old female with complaints of right-sided chest pain after hitting the right side of her chest on her wheelchair after ground level fall.  Differential Diagnosis:   Pneumothorax, rib fracture, rib contusion  Clinical Tests:   Radiological Study: Ordered and Reviewed  ED Management:  CT chest shows acute fracture of the right posterior 8th and  9th ribs with small right pneumothorax.  Multilevel compression fractures involving nearly the entire T-spine.  Patient states the compression fractures are old.    21:41 patient signed out to Dr. Laguerre    Pt with small PTX and ribs fxs after fall. I reviewed images and examined patient. She is well appearing with stable VS. Will obs for am CXR and cardiac/O2 monitoring.           ED Course as of 07/12/23 2148 Wed Jul 12, 2023 2145 D/w Dr Allen. Will be available for consult if needed. [DC]      ED Course User Index  [DC] Byron Laguerre Jr., MD                 Clinical Impression:   Final diagnoses:  [S22.41XA] Closed fracture of multiple ribs of right side, initial encounter (Primary)  [S22.41XA, S27.0XXA] Closed traumatic fracture of ribs of right side with pneumothorax  [S27.0XXA] Traumatic pneumothorax, initial encounter        ED Disposition Condition    Observation Stable                Byron Laguerre Jr., MD  07/12/23 2148

## 2023-07-13 NOTE — PLAN OF CARE
07/13/23 1210   Medicare Message   Important Message from Medicare regarding Discharge Appeal Rights Explained to patient/caregiver;Signed/date by patient/caregiver   Date IMM was signed 07/13/23   Time IMM was signed 1158   WU Message   Medicare Outpatient and Observation Notification regarding financial responsibility Explained to patient/caregiver;Signed/date by patient/caregiver   Date WU was signed 07/13/23   Time WU was signed 1158

## 2023-07-13 NOTE — ED NOTES
"1st contact with pt, EMS report they were instructed to bring pt to ER from [facility]/ICU/upstairs, pt was accepted to [facility] ICU they were instructed to bring pt to ER due to hypotension, EMS reports the following vital on route to this Facility  bp 117/64, hr 117, bp 122/71, hr 113, bp 109/50 hr 105,   EMS reports the following bp on arrival to this facility on route to ICU, bp 76/46 hr 104, bp 88/45, hr 102    Currently bp 129/63, hr 100, resp 26, on NC at 5L      On my fist contact with pt, a&ox3, resp even non labored, resp 26, calm, in no acute distress, appears fatigue, pt reports reason she went to the ER for evaluation was due to a fall yesterday, pt states " I was unpacking some shoes and I feel backward" pt reports she was bend over, attempting to stand up and fell backward, striking her back to floor, neg LOC, pt reports " I hit the floor"" I force myself to get up" pt reports she made it to her bed and was able to get up, pt reports fall occurred yesterday and today her " grandson" called 911,     Currently pt c/o pain to " my whole body" rated 10/10, reports SOB described as " a little bit"   Apologies made to pt for the confusion of her being currently in the ER, pt is alert and had witness the conversation between ER staff and EMS, pt verbalized understanding, I reassure pt that we will take care of her,      Skin dry, warm, thin, fragile, noted 20G R wrist saline lock, 20G L AC, 1000ml LR infusing via gravity, approx 200ml TBI,   "

## 2023-07-13 NOTE — ASSESSMENT & PLAN NOTE
She has a pulmonary contusion with hemorrhage on Xarelto and ASA  Type and screen ordered  Serial CBC q4  Low threshold for moving to ICU with any signs of decompensation  Avoiding all anticoagulants

## 2023-07-13 NOTE — ASSESSMENT & PLAN NOTE
She denies all back pain  Suspect all is chronic  Consider bone scan if worse in am  No MRI with pacer

## 2023-07-13 NOTE — ED NOTES
Patient's Grandson called requesting call back from Dr. Nation for update.  Call back number [phone].  Dr. Nation advised.

## 2023-07-13 NOTE — ASSESSMENT & PLAN NOTE
The pacemaker is visible and has no signs of trauma on camera  I'm not sure why this is there per records  Given anticoagulation, high suspicion for PAfib in past

## 2023-07-13 NOTE — ASSESSMENT & PLAN NOTE
CBC q4 hours     Latest Reference Range & Units 08/19/19 16:00 06/22/23 15:16 07/12/23 21:26   Hemoglobin 12.0 - 16.0 g/dL 13.5 13.6 11.7 (L)

## 2023-07-13 NOTE — ASSESSMENT & PLAN NOTE
She has what appears to be chronic degenerative changes of thoracic spine including compression fractures  Dedicated CT of the C-spine, thoracic spine, lumbar spine given trauma  She says her pain is similar to her chronic pain  Pain control

## 2023-07-13 NOTE — ASSESSMENT & PLAN NOTE
Appears to have had a mechanical fall  Obtain trauma CT scans  CT head pending  Does not appear to have any head injuries and no neurological deficits  Is on Eliquis

## 2023-07-13 NOTE — ASSESSMENT & PLAN NOTE
Per records, she takes the Xarelto qpm, so that means she has been 24 hours without it, hopefully  Holding all anticoagulants and anti-PLT meds

## 2023-07-14 PROBLEM — G24.3 ISOLATED CERVICAL DYSTONIA: Status: ACTIVE | Noted: 2023-07-14

## 2023-07-14 PROBLEM — I48.91 ATRIAL FIBRILLATION: Status: ACTIVE | Noted: 2023-07-14

## 2023-07-14 PROBLEM — G89.29 CHRONIC PAIN: Status: ACTIVE | Noted: 2023-07-14

## 2023-07-14 PROBLEM — N18.32 CHRONIC KIDNEY DISEASE, STAGE 3B: Status: RESOLVED | Noted: 2023-07-12 | Resolved: 2023-07-14

## 2023-07-14 PROBLEM — J30.9 ALLERGIC RHINITIS: Status: ACTIVE | Noted: 2023-07-14

## 2023-07-14 PROBLEM — S52.502A TRAUMATIC CLOSED DISPLACED FRACTURE OF DISTAL END OF RADIUS AND ULNA, LEFT, INITIAL ENCOUNTER: Status: RESOLVED | Noted: 2019-03-22 | Resolved: 2023-07-14

## 2023-07-14 PROBLEM — G45.9 TRANSIENT ISCHEMIC ATTACK: Status: ACTIVE | Noted: 2023-07-14

## 2023-07-14 PROBLEM — E78.5 HLD (HYPERLIPIDEMIA): Status: ACTIVE | Noted: 2023-07-14

## 2023-07-14 PROBLEM — R91.8 RIGHT LOWER LOBE LUNG MASS: Status: ACTIVE | Noted: 2023-07-14

## 2023-07-14 PROBLEM — R53.1 WEAKNESS: Status: ACTIVE | Noted: 2023-07-14

## 2023-07-14 PROBLEM — F41.9 ANXIETY: Status: ACTIVE | Noted: 2023-07-14

## 2023-07-14 PROBLEM — M19.90 OSTEOARTHRITIS: Status: ACTIVE | Noted: 2023-07-14

## 2023-07-14 PROBLEM — M85.80 OSTEOPENIA: Status: ACTIVE | Noted: 2023-07-14

## 2023-07-14 PROBLEM — S52.602A TRAUMATIC CLOSED DISPLACED FRACTURE OF DISTAL END OF RADIUS AND ULNA, LEFT, INITIAL ENCOUNTER: Status: RESOLVED | Noted: 2019-03-22 | Resolved: 2023-07-14

## 2023-07-14 PROBLEM — I49.5 SICK SINUS SYNDROME: Status: ACTIVE | Noted: 2023-07-14

## 2023-07-14 PROBLEM — M54.50 LOW BACK PAIN: Status: ACTIVE | Noted: 2023-07-14

## 2023-07-14 PROBLEM — S72.009A: Status: ACTIVE | Noted: 2023-07-14

## 2023-07-14 PROBLEM — S32.000A LUMBAR COMPRESSION FRACTURE: Status: ACTIVE | Noted: 2023-07-14

## 2023-07-14 PROBLEM — R09.89 CAROTID BRUIT: Status: ACTIVE | Noted: 2023-07-14

## 2023-07-14 LAB
ALBUMIN SERPL BCP-MCNC: 3.1 G/DL (ref 3.5–5.2)
ALP SERPL-CCNC: 45 U/L (ref 55–135)
ALT SERPL W/O P-5'-P-CCNC: 15 U/L (ref 10–44)
ANION GAP SERPL CALC-SCNC: 5 MMOL/L (ref 8–16)
AST SERPL-CCNC: 23 U/L (ref 10–40)
BASOPHILS # BLD AUTO: 0.02 K/UL (ref 0–0.2)
BASOPHILS # BLD AUTO: 0.02 K/UL (ref 0–0.2)
BASOPHILS # BLD AUTO: 0.04 K/UL (ref 0–0.2)
BASOPHILS NFR BLD: 0.2 % (ref 0–1.9)
BASOPHILS NFR BLD: 0.2 % (ref 0–1.9)
BASOPHILS NFR BLD: 0.4 % (ref 0–1.9)
BILIRUB SERPL-MCNC: 0.9 MG/DL (ref 0.1–1)
BLD PROD TYP BPU: NORMAL
BLD PROD TYP BPU: NORMAL
BLOOD UNIT EXPIRATION DATE: NORMAL
BLOOD UNIT EXPIRATION DATE: NORMAL
BLOOD UNIT TYPE CODE: 6200
BLOOD UNIT TYPE CODE: 6200
BLOOD UNIT TYPE: NORMAL
BLOOD UNIT TYPE: NORMAL
BUN SERPL-MCNC: 22 MG/DL (ref 8–23)
CALCIUM SERPL-MCNC: 8.5 MG/DL (ref 8.7–10.5)
CHLORIDE SERPL-SCNC: 101 MMOL/L (ref 95–110)
CO2 SERPL-SCNC: 28 MMOL/L (ref 23–29)
CODING SYSTEM: NORMAL
CODING SYSTEM: NORMAL
CREAT SERPL-MCNC: 0.6 MG/DL (ref 0.5–1.4)
CROSSMATCH INTERPRETATION: NORMAL
CROSSMATCH INTERPRETATION: NORMAL
DIFFERENTIAL METHOD: ABNORMAL
DISPENSE STATUS: NORMAL
DISPENSE STATUS: NORMAL
EOSINOPHIL # BLD AUTO: 0 K/UL (ref 0–0.5)
EOSINOPHIL # BLD AUTO: 0 K/UL (ref 0–0.5)
EOSINOPHIL # BLD AUTO: 0.1 K/UL (ref 0–0.5)
EOSINOPHIL NFR BLD: 0 % (ref 0–8)
EOSINOPHIL NFR BLD: 0.2 % (ref 0–8)
EOSINOPHIL NFR BLD: 1.3 % (ref 0–8)
ERYTHROCYTE [DISTWIDTH] IN BLOOD BY AUTOMATED COUNT: 11.9 % (ref 11.5–14.5)
ERYTHROCYTE [DISTWIDTH] IN BLOOD BY AUTOMATED COUNT: 12 % (ref 11.5–14.5)
ERYTHROCYTE [DISTWIDTH] IN BLOOD BY AUTOMATED COUNT: 13.6 % (ref 11.5–14.5)
ERYTHROCYTE [DISTWIDTH] IN BLOOD BY AUTOMATED COUNT: 13.6 % (ref 11.5–14.5)
EST. GFR  (NO RACE VARIABLE): >60 ML/MIN/1.73 M^2
FOLATE SERPL-MCNC: 15.8 NG/ML (ref 4–24)
GLUCOSE SERPL-MCNC: 110 MG/DL (ref 70–110)
HCT VFR BLD AUTO: 21.2 % (ref 37–48.5)
HCT VFR BLD AUTO: 23.7 % (ref 37–48.5)
HCT VFR BLD AUTO: 26.5 % (ref 37–48.5)
HCT VFR BLD AUTO: 29.6 % (ref 37–48.5)
HGB BLD-MCNC: 10 G/DL (ref 12–16)
HGB BLD-MCNC: 7 G/DL (ref 12–16)
HGB BLD-MCNC: 8 G/DL (ref 12–16)
HGB BLD-MCNC: 8.8 G/DL (ref 12–16)
IMM GRANULOCYTES # BLD AUTO: 0.04 K/UL (ref 0–0.04)
IMM GRANULOCYTES # BLD AUTO: 0.05 K/UL (ref 0–0.04)
IMM GRANULOCYTES # BLD AUTO: 0.05 K/UL (ref 0–0.04)
IMM GRANULOCYTES NFR BLD AUTO: 0.4 % (ref 0–0.5)
IMM GRANULOCYTES NFR BLD AUTO: 0.5 % (ref 0–0.5)
IMM GRANULOCYTES NFR BLD AUTO: 0.5 % (ref 0–0.5)
LYMPHOCYTES # BLD AUTO: 0.9 K/UL (ref 1–4.8)
LYMPHOCYTES # BLD AUTO: 1.1 K/UL (ref 1–4.8)
LYMPHOCYTES # BLD AUTO: 1.6 K/UL (ref 1–4.8)
LYMPHOCYTES NFR BLD: 11.6 % (ref 18–48)
LYMPHOCYTES NFR BLD: 14.3 % (ref 18–48)
LYMPHOCYTES NFR BLD: 9.6 % (ref 18–48)
MAGNESIUM SERPL-MCNC: 1.8 MG/DL (ref 1.6–2.6)
MCH RBC QN AUTO: 30.9 PG (ref 27–31)
MCH RBC QN AUTO: 31.3 PG (ref 27–31)
MCH RBC QN AUTO: 31.3 PG (ref 27–31)
MCH RBC QN AUTO: 32.3 PG (ref 27–31)
MCHC RBC AUTO-ENTMCNC: 33 G/DL (ref 32–36)
MCHC RBC AUTO-ENTMCNC: 33.2 G/DL (ref 32–36)
MCHC RBC AUTO-ENTMCNC: 33.8 G/DL (ref 32–36)
MCHC RBC AUTO-ENTMCNC: 33.8 G/DL (ref 32–36)
MCV RBC AUTO: 93 FL (ref 82–98)
MCV RBC AUTO: 93 FL (ref 82–98)
MCV RBC AUTO: 95 FL (ref 82–98)
MCV RBC AUTO: 96 FL (ref 82–98)
MONOCYTES # BLD AUTO: 0.7 K/UL (ref 0.3–1)
MONOCYTES # BLD AUTO: 0.8 K/UL (ref 0.3–1)
MONOCYTES # BLD AUTO: 0.9 K/UL (ref 0.3–1)
MONOCYTES NFR BLD: 7.5 % (ref 4–15)
MONOCYTES NFR BLD: 8.6 % (ref 4–15)
MONOCYTES NFR BLD: 8.8 % (ref 4–15)
NEUTROPHILS # BLD AUTO: 7.3 K/UL (ref 1.8–7.7)
NEUTROPHILS # BLD AUTO: 7.5 K/UL (ref 1.8–7.7)
NEUTROPHILS # BLD AUTO: 8.2 K/UL (ref 1.8–7.7)
NEUTROPHILS NFR BLD: 74.9 % (ref 38–73)
NEUTROPHILS NFR BLD: 80.2 % (ref 38–73)
NEUTROPHILS NFR BLD: 80.8 % (ref 38–73)
NRBC BLD-RTO: 0 /100 WBC
NUM UNITS TRANS FFP: NORMAL
NUM UNITS TRANS PACKED RBC: NORMAL
PLATELET # BLD AUTO: 148 K/UL (ref 150–450)
PLATELET # BLD AUTO: 165 K/UL (ref 150–450)
PLATELET # BLD AUTO: 165 K/UL (ref 150–450)
PLATELET # BLD AUTO: 177 K/UL (ref 150–450)
PMV BLD AUTO: 12.5 FL (ref 9.2–12.9)
PMV BLD AUTO: 12.6 FL (ref 9.2–12.9)
PMV BLD AUTO: 12.7 FL (ref 9.2–12.9)
PMV BLD AUTO: 12.8 FL (ref 9.2–12.9)
POTASSIUM SERPL-SCNC: 4.3 MMOL/L (ref 3.5–5.1)
PROT SERPL-MCNC: 5.1 G/DL (ref 6–8.4)
RBC # BLD AUTO: 2.24 M/UL (ref 4–5.4)
RBC # BLD AUTO: 2.48 M/UL (ref 4–5.4)
RBC # BLD AUTO: 2.85 M/UL (ref 4–5.4)
RBC # BLD AUTO: 3.19 M/UL (ref 4–5.4)
SODIUM SERPL-SCNC: 134 MMOL/L (ref 136–145)
TROPONIN I SERPL HS-MCNC: 35.2 PG/ML (ref 0–14.9)
TROPONIN I SERPL HS-MCNC: 38.8 PG/ML (ref 0–14.9)
VIT B12 SERPL-MCNC: 719 PG/ML (ref 210–950)
WBC # BLD AUTO: 10.97 K/UL (ref 3.9–12.7)
WBC # BLD AUTO: 11.18 K/UL (ref 3.9–12.7)
WBC # BLD AUTO: 9.12 K/UL (ref 3.9–12.7)
WBC # BLD AUTO: 9.29 K/UL (ref 3.9–12.7)

## 2023-07-14 PROCEDURE — 20000000 HC ICU ROOM

## 2023-07-14 PROCEDURE — 85027 COMPLETE CBC AUTOMATED: CPT | Performed by: STUDENT IN AN ORGANIZED HEALTH CARE EDUCATION/TRAINING PROGRAM

## 2023-07-14 PROCEDURE — P9016 RBC LEUKOCYTES REDUCED: HCPCS | Performed by: INTERNAL MEDICINE

## 2023-07-14 PROCEDURE — 27000221 HC OXYGEN, UP TO 24 HOURS

## 2023-07-14 PROCEDURE — 93010 EKG 12-LEAD: ICD-10-PCS | Mod: ,,, | Performed by: SPECIALIST

## 2023-07-14 PROCEDURE — 94761 N-INVAS EAR/PLS OXIMETRY MLT: CPT

## 2023-07-14 PROCEDURE — 93005 ELECTROCARDIOGRAM TRACING: CPT | Performed by: SPECIALIST

## 2023-07-14 PROCEDURE — 80053 COMPREHEN METABOLIC PANEL: CPT | Performed by: STUDENT IN AN ORGANIZED HEALTH CARE EDUCATION/TRAINING PROGRAM

## 2023-07-14 PROCEDURE — 63600175 PHARM REV CODE 636 W HCPCS: Performed by: STUDENT IN AN ORGANIZED HEALTH CARE EDUCATION/TRAINING PROGRAM

## 2023-07-14 PROCEDURE — 36415 COLL VENOUS BLD VENIPUNCTURE: CPT | Performed by: STUDENT IN AN ORGANIZED HEALTH CARE EDUCATION/TRAINING PROGRAM

## 2023-07-14 PROCEDURE — 25000003 PHARM REV CODE 250: Performed by: STUDENT IN AN ORGANIZED HEALTH CARE EDUCATION/TRAINING PROGRAM

## 2023-07-14 PROCEDURE — P9017 PLASMA 1 DONOR FRZ W/IN 8 HR: HCPCS | Performed by: STUDENT IN AN ORGANIZED HEALTH CARE EDUCATION/TRAINING PROGRAM

## 2023-07-14 PROCEDURE — 25000003 PHARM REV CODE 250: Performed by: INTERNAL MEDICINE

## 2023-07-14 PROCEDURE — 36430 TRANSFUSION BLD/BLD COMPNT: CPT

## 2023-07-14 PROCEDURE — 85025 COMPLETE CBC W/AUTO DIFF WBC: CPT | Mod: 91 | Performed by: STUDENT IN AN ORGANIZED HEALTH CARE EDUCATION/TRAINING PROGRAM

## 2023-07-14 PROCEDURE — 82607 VITAMIN B-12: CPT | Performed by: STUDENT IN AN ORGANIZED HEALTH CARE EDUCATION/TRAINING PROGRAM

## 2023-07-14 PROCEDURE — 84484 ASSAY OF TROPONIN QUANT: CPT | Performed by: STUDENT IN AN ORGANIZED HEALTH CARE EDUCATION/TRAINING PROGRAM

## 2023-07-14 PROCEDURE — 93010 ELECTROCARDIOGRAM REPORT: CPT | Mod: ,,, | Performed by: SPECIALIST

## 2023-07-14 PROCEDURE — 85025 COMPLETE CBC W/AUTO DIFF WBC: CPT | Performed by: STUDENT IN AN ORGANIZED HEALTH CARE EDUCATION/TRAINING PROGRAM

## 2023-07-14 PROCEDURE — 99291 CRITICAL CARE FIRST HOUR: CPT | Mod: ,,, | Performed by: INTERNAL MEDICINE

## 2023-07-14 PROCEDURE — 82746 ASSAY OF FOLIC ACID SERUM: CPT | Performed by: STUDENT IN AN ORGANIZED HEALTH CARE EDUCATION/TRAINING PROGRAM

## 2023-07-14 PROCEDURE — 99900035 HC TECH TIME PER 15 MIN (STAT)

## 2023-07-14 PROCEDURE — 83735 ASSAY OF MAGNESIUM: CPT | Performed by: STUDENT IN AN ORGANIZED HEALTH CARE EDUCATION/TRAINING PROGRAM

## 2023-07-14 PROCEDURE — 99291 PR CRITICAL CARE, E/M 30-74 MINUTES: ICD-10-PCS | Mod: ,,, | Performed by: INTERNAL MEDICINE

## 2023-07-14 PROCEDURE — 94799 UNLISTED PULMONARY SVC/PX: CPT

## 2023-07-14 RX ORDER — HYDROCODONE BITARTRATE AND ACETAMINOPHEN 500; 5 MG/1; MG/1
TABLET ORAL
Status: DISCONTINUED | OUTPATIENT
Start: 2023-07-14 | End: 2023-07-15

## 2023-07-14 RX ORDER — HYDROCODONE BITARTRATE AND ACETAMINOPHEN 5; 325 MG/1; MG/1
1 TABLET ORAL EVERY 6 HOURS PRN
Status: DISCONTINUED | OUTPATIENT
Start: 2023-07-14 | End: 2023-07-15

## 2023-07-14 RX ORDER — TRAZODONE HYDROCHLORIDE 50 MG/1
50 TABLET ORAL NIGHTLY
Status: DISCONTINUED | OUTPATIENT
Start: 2023-07-14 | End: 2023-07-25 | Stop reason: HOSPADM

## 2023-07-14 RX ORDER — NALOXONE HCL 0.4 MG/ML
0.4 VIAL (ML) INJECTION
Status: DISCONTINUED | OUTPATIENT
Start: 2023-07-14 | End: 2023-07-25 | Stop reason: HOSPADM

## 2023-07-14 RX ORDER — ACETAMINOPHEN 325 MG/1
650 TABLET ORAL EVERY 6 HOURS PRN
Status: DISCONTINUED | OUTPATIENT
Start: 2023-07-14 | End: 2023-07-25 | Stop reason: HOSPADM

## 2023-07-14 RX ORDER — BACLOFEN 10 MG/1
10 TABLET ORAL 2 TIMES DAILY PRN
Status: DISCONTINUED | OUTPATIENT
Start: 2023-07-14 | End: 2023-07-25 | Stop reason: HOSPADM

## 2023-07-14 RX ADMIN — MUPIROCIN 1 G: 20 OINTMENT TOPICAL at 08:07

## 2023-07-14 RX ADMIN — HYDROCODONE BITARTRATE AND ACETAMINOPHEN 1 TABLET: 5; 325 TABLET ORAL at 10:07

## 2023-07-14 RX ADMIN — HYDROCODONE BITARTRATE AND ACETAMINOPHEN 1 TABLET: 5; 325 TABLET ORAL at 04:07

## 2023-07-14 RX ADMIN — MUPIROCIN 1 G: 20 OINTMENT TOPICAL at 02:07

## 2023-07-14 RX ADMIN — TRAZODONE HYDROCHLORIDE 50 MG: 50 TABLET ORAL at 08:07

## 2023-07-14 RX ADMIN — MUPIROCIN 1 G: 20 OINTMENT TOPICAL at 10:07

## 2023-07-14 RX ADMIN — ATORVASTATIN CALCIUM 10 MG: 10 TABLET, FILM COATED ORAL at 10:07

## 2023-07-14 RX ADMIN — MAGNESIUM SULFATE HEPTAHYDRATE 2 G: 40 INJECTION, SOLUTION INTRAVENOUS at 07:07

## 2023-07-14 RX ADMIN — DILTIAZEM HYDROCHLORIDE 120 MG: 120 CAPSULE, COATED, EXTENDED RELEASE ORAL at 10:07

## 2023-07-14 NOTE — PLAN OF CARE
Pt remained free from falls/trauma/injuries. 1 unit FFP given and 1 unit of blood currently transfusing. VSS. ST on the monitor. Afebrile throughout the night. CT dressing saturated upon arrival to ICU, dressing reinforced. Fall bundle in place. POC explained, no questions at this time. Pt tolerating care.      Problem: Adult Inpatient Plan of Care  Goal: Plan of Care Review  Outcome: Ongoing, Progressing     Problem: Adult Inpatient Plan of Care  Goal: Patient-Specific Goal (Individualized)  Outcome: Ongoing, Progressing

## 2023-07-14 NOTE — CONSULTS
07/13/2023      Admit Date: 7/13/2023  Alvina Guallpa  New Patient Consult    Chief Complaint   Patient presents with    Fall       History of Present Illness:  Pt is an 82 yo female with right side hemopneumothorax after a fall at home. She is a poor historian but does remember she has heart disease. It seems she is on xarelto however she does not remember this. She endorses pain on the right side of her back, better s/p pain meds.. She was admitted at [place] yesterday and transferred to [facility] ED for higher level of care. She underwent right side chest tube placement in ED and is being transferred to ICU.       PFSH:  Past Medical History:   Diagnosis Date    Anxiety     Chronic neck pain     Depression     Hypertension     Osteoporosis     Seasonal allergies     TIA (transient ischemic attack)      Past Surgical History:   Procedure Laterality Date    HYSTERECTOMY      INSERTION OF PACEMAKER      NASAL POLYP SURGERY Bilateral     NECK SURGERY       Social History     Tobacco Use    Smoking status: Never    Smokeless tobacco: Never   Substance Use Topics    Alcohol use: No     Alcohol/week: 0.0 standard drinks    Drug use: No     Family History   Problem Relation Age of Onset    Cancer Mother     Stomach cancer Mother      Review of patient's allergies indicates:   Allergen Reactions    Penicillins Hives       Performance Status:Performance Status:The patient's activity level is was not done.    Review of Systems:  due to neurologic status/impairments a Review of Systems could not be obtained       Exam:Comprehensive exam done. BP (!) 140/62 (Patient Position: Sitting)   Pulse 105   Temp 97.6 °F (36.4 °C) (Oral)   Resp (!) 23   SpO2 99%   Exam included Vitals as listed, and patient's appearance and affect and alertness and mood, oral exam for yeast and hygiene and pharynx lesions and Mallapatti (M) score, neck with inspection for jvd and masses and thyroid abnormalities and lymph nodes (supraclavicular  and infraclavicular nodes also examined and noted if abn), chest exam included symmetry and effort and fremitus and percussion and auscultation, cardiac exam included rhythm and gallops and murmur and rubs and jvd and edema, abdominal exam for mass and hepatosplenomegaly and tenderness and hernias and bowel sounds, Musculoskeletal exam with muscle tone and posture and mobility/gait and  strenght, and skin for rashes and cyanosis and pallor and turgor, extremity for clubbing.  Findings were normal except as listed below:  Awake, alert, no distress  Oropharynx clear  HR regular and tachycardic with systolic murmur  Breath sounds diminished R base, clear on left  Right chest wall bruising and tenderness, subq hematoma palpable, no crepitus or flail chest   Abd soft nontender  Wearing diaper  Follows commands all extremities  Delayed cap refill fingertips  Cool fingers w/ mottled appearance  No edema/clubbing    Radiographs reviewed: view by direct vision and interpreted  CXR 7/13- chest tube in place with improved ptx and effusion  CT chest 7/13- moderate R hemopneumothorax with R 8th-9th rib fractures, RLL cavitary mass/hematoma and surrounding lung contusion, subcutaneous air R chest wall up to neck    XR hip  Left superior pubic ramus fracture of uncertain chronicity, possibly acute.  Old intertrochanteric left hip fracture status post ORIF.  Severe bony demineralization and degenerative changes.            Labs     Recent Labs   Lab 07/13/23  0833   WBC 10.29   HGB 9.7*   HCT 27.7*        Recent Labs   Lab 07/12/23  2126 07/12/23  2126 07/13/23  0158 07/13/23  0458 07/13/23  0833 07/13/23  1128   *  --   --  130* 132*  --    K 4.9  --   --  5.1 4.3  --    CL 94*  --   --  97 100  --    CO2 25  --   --  24 22*  --    BUN 25*  --   --  24* 23  --    CREATININE 1.2  --   --  0.9 0.8  --    *  --   --  103 107  --    CALCIUM 9.5  --   --  8.7 8.6*  --    MG  --   --   --  1.7  --   --    PHOS   --   --   --  4.3  --   --    AST 24  --   --   --  26  --    ALT 13  --   --   --  13  --    ALKPHOS 70  --   --   --  58  --    BILITOT 0.4  --   --   --  0.9  --    PROT 6.5  --   --   --  5.6*  --    ALBUMIN 4.1  --   --   --  3.5  --    INR 1.1  --   --   --   --   --    LACTATE  --   --   --   --  2.1  --    TROPONINI  --    < >  --   --  0.037* 0.013   CPK  --   --  354*  --   --   --     < > = values in this interval not displayed.   No results for input(s): PH, PCO2, PO2, HCO3 in the last 24 hours.  Microbiology Results (last 7 days)       ** No results found for the last 168 hours. **            Impression:  Active Hospital Problems    Diagnosis  POA    *Traumatic hemopneumothorax, initial encounter [S27.2XXA]  Yes    Closed fracture of ramus of left pubis [S32.592A]  Unknown    Fall [W19.XXXA]  Unknown    Troponin level elevated [R77.8]  Unknown    Multiple closed fractures of ribs of right side [S22.41XA]  Yes    Cardiac pacemaker in situ [Z95.0]  Yes    Primary hypertension [I10]  Yes    Acute posthemorrhagic anemia [D62]  Yes    Contusion of lung, closed, initial encounter [S27.329A]  Yes    Compression fracture of body of thoracic vertebra [S22.000A]  Yes      Resolved Hospital Problems   No resolved problems to display.               Plan:     - trend H/H  - type & screen, transfuse for Hgb <8 or if VS become unstable  - pain control  - monitor chest tube output, place to suction  - trauma imaging pending  - thoracic surg consulted  - hold anticoagulation  - consider Kcentra if ongoing excessive bleeding  - d/w ED physician and hospitalist    The following were evaluated and adjusted as needed: hemodynamics, support tubes and access lines and invasive monitoring, and input and output and renal status       Critical Care  - THE PATIENT HAS A HIGH PROBABILITY OF IMMINENT OR LIFE THREATENING DETERIORATION.  Over 50%time of encounter was in direct care at bedside.  Time was 30 to 74 minutes required for  patient care.  Time needed for all of the above totaled 32 minutes.    Shirley Dhaliwal MD  Pulmonary & Critical Care Medicine

## 2023-07-14 NOTE — PROGRESS NOTES
[facility] Medicine  Progress Note    Patient Name: Alvina Guallpa  MRN: [id number]  Patient Class: IP- Inpatient   Admission Date: 7/13/2023  Length of Stay: 1 days  Attending Physician: Ramón Mario MD  Primary Care Provider: EDY Hurd        Subjective:     Principal Problem:Traumatic hemopneumothorax, initial encounter        HPI:  Patient is an 83-year-old female with a history of AFib, hypertension, hyperlipidemia, scoliosis with multiple chronic spinal degenerative changes with chronic back pain, history of pelvic fracture and hip fracture in the past who presents after tripping and falling at home.  She was recently replaced on Eliquis by her cardiologist due to episodes of AFib.  She tripped and fell at home onto her right side and developed severe pain in her side.  She went to the ER at [place] and was evaluated and found to have rib fractures at level 8 and 9 in addition to what appeared to be multiple chronic spinal compression fractures on CT imaging.  CT of the chest reveals hemopneumothorax with small apical pneumothorax.  She was admitted there and monitored with repeat CT scan today showing enlarging hemopneumothorax.    Additionally, she was noted to have EKG changes with mildly elevated troponin though her troponin level trended down.  She is not having any chest pain.  She had an episode of hypotension that improved with IV fluids.    She was transferred per  transfer to [facility] for further evaluation.  EN route, per Pulmonary request the patient was redirected to the ER for further evaluation and chest tube placement.    On arrival in the ED:  The patient's vital signs are stable.  She is mildly tachycardic with heart rate in the low 100s.  She is comfortable and easily communicating.  She says she has back pain that is similar to her chronic back pain.  She has mild right rib pain.  Her pelvis is without pain at this time.  She has no  other complaints.  She is breathing easily.  I discussed with the ED provider and Dr. Baker has graciously assisted with placement of chest tube.  Additionally, Dr Baker discussed with Dr Weiss who will consult for any potential need of surgical involvement and will assist with management of the chest tube.  I discussed with Dr. Carrasco and with Dr. Dhaliwal from the Pulmonary team regarding further management.  The patient will be moved to the ICU and will complete trauma workup with scans once chest tube is placed.    See referring provider note below.    83-year-old woman with PMH HTN, TIA (on Eliquis), pacemaker, and osteoporosis adm to  on 07/12 after falling and hitting her right side on her wheelchair.  Patient said that she tripped.  There was no LOC and she did not hit her head.  ROS pos for right-sided chest wall pain.       On admission /93, HR 95.  Exam was pos for bruising on the right chest and arms and impaired recent memory.  Labs:  WBC 13.1, Hb 11.7, Na 131, K 4.9, Cr 1.2.      CT chest WO contrast pos for acute fractures of right ribs 8 -9 with small pneumothorax and extensive subcutaneous emphysema.  Small right hemothorax, right lower lung contusion/hematoma.  Also,, extensive compression deformities throughout the thoracic spine of uncertain chronicity.      EKG NSR notable for small T-wave inversion V2-4 which were not present on an earlier EKG (03/2020).      Patient became hypotensive this morning on her way to radiology for a repeat CT chest.  BP improved when patient placed in Trendelenburg and given IVF.      Repeat ECG  (829 h) pos for deep T-wave inversion in V2-6 which was not present on the earlier ECG. Troponin 0.037 > 0.013.      CT chest today pos for interval increase in right pleural effusion/ hemothorax with no significant change in small pneumothorax.  Also, increasing atelectasis of the right lower lobe obscuring the previously described hematoma/ contusion.   "Also, mild atelectasis on the left.       Patient complained of left hip pain this morning.  X-ray pos for left superior pubic ramus fracture of uncertain chronicity possibly acute.  also,, old intertrochanteric left hip fracture s/p ORIF.  Severe bony demineralization and degenerative changes noted.     VS (12 18 h) /62, , RR 15, SpO2 100% (2 L).  Labs WBC 13.1 > 10.2, Hb 11.7 > 9.7, Na 131> 132, Cr 1.2 > 0.8     Transfer requested for higher level of care.  Transfer diagnosis multiple right-sided rib fractures, right pulmonary contusion, right apical pneumothorax (small), rt > lt pleural effusion, left pubic ramus fracture of uncertain chronicity, EKG changes concerning for ischemia      Overview/Hospital Course:  Alvina Guallpa is an 83 year old female with a past medical history of Afib on Xarelto, cardiac pacemaker, HTN, anemia, HLD, and osteopenia with chronic thoracic, lumbar, left femur and pelvic fractures who presented with a mechanical fall leading to right sided 8th and 9th rib fractures with a hemopneumothorax. She had a chest tube placed in the ED upon transfer from [place]. Her Hgb has fallen to 7 as there has been ~600 cc of output from the chest tube. She received one unit of FFP and one unit of PRBCs. Home Xarelto is currently held. Hgb is being trended. Pulmonary is following. She is stable on 5 L NC. Troponin is also being trended given mild elevation with TWIs at the lateral leads.      Interval History: see "Hospital Course"    Review of Systems   Respiratory:  Negative for shortness of breath.    Cardiovascular:  Negative for chest pain.   Objective:     Vital Signs (Most Recent):  Temp: 98.2 °F (36.8 °C) (07/14/23 1101)  Pulse: 105 (07/14/23 1101)  Resp: 17 (07/14/23 1101)  BP: (!) 155/75 (07/14/23 1101)  SpO2: 99 % (07/14/23 1101) Vital Signs (24h Range):  Temp:  [97.5 °F (36.4 °C)-98.4 °F (36.9 °C)] 98.2 °F (36.8 °C)  Pulse:  [] 105  Resp:  [13-30] 17  SpO2:  [98 %-100 %] 99 " %  BP: (106-174)/(59-84) 155/75     Weight: 47.6 kg (104 lb 15 oz)  Body mass index is 18.01 kg/m².    Intake/Output Summary (Last 24 hours) at 7/14/2023 1626  Last data filed at 7/14/2023 1315  Gross per 24 hour   Intake 590.67 ml   Output 860 ml   Net -269.33 ml         Physical Exam  Vitals and nursing note reviewed.   Constitutional:       Appearance: She is ill-appearing.   HENT:      Head: Normocephalic and atraumatic.      Right Ear: External ear normal.      Left Ear: External ear normal.      Nose: Nose normal.      Mouth/Throat:      Mouth: Mucous membranes are moist.      Pharynx: Oropharynx is clear.   Eyes:      Extraocular Movements: Extraocular movements intact.      Conjunctiva/sclera: Conjunctivae normal.   Cardiovascular:      Rate and Rhythm: Regular rhythm. Tachycardia present.      Pulses: Normal pulses.      Heart sounds: Normal heart sounds.   Pulmonary:      Effort: Pulmonary effort is normal. No respiratory distress.      Breath sounds: No rales.      Comments: Decreased breath sounds at the bases. NC O2. Chest tube right base with ~700 cc serosanguinous fluid.  Abdominal:      General: Bowel sounds are normal.      Palpations: Abdomen is soft.   Musculoskeletal:         General: Normal range of motion.      Cervical back: Normal range of motion and neck supple.      Right lower leg: No edema.      Left lower leg: No edema.   Skin:     Coloration: Skin is pale.   Neurological:      Mental Status: She is alert. Mental status is at baseline.   Psychiatric:         Mood and Affect: Mood normal.         Behavior: Behavior normal.           Significant Labs: All pertinent labs within the past 24 hours have been reviewed.    Significant Imaging: I have reviewed all pertinent imaging results/findings within the past 24 hours.      Assessment/Plan:      * Traumatic hemopneumothorax, initial encounter  -Chest tube placed by Dr. Baker in the ED  -Hold home anticoagulation  -Trend CBC  -Pulmonary  consulted  -Serial CXRs      Fall  -Fall precautions  -PT/OT when able      Acute posthemorrhagic anemia  Improving.  -S/p FFP and one unit PRBCs  -Hold anticoagulation  -Trend CBC      Troponin level elevated  Mildly elevated. Stable. EKG shows TWIs at lateral leads.  -Continue telemetry  -Trend troponin  -Consider Cardiology consultation    Right lower lobe lung mass  -Continue to monitor  -Pulmonary consulted  -Dedicated imaging once hemopneumothorax resolves      Lumbar compression fracture  Chronic.  -PT/OT  -PRN analgesics      HLD (hyperlipidemia)  -Continue statin      Atrial fibrillation  -Telemetry  -Hold anticoagulation  -Continue diltiazem    Closed fracture of ramus of left pubis  Chronic.  -PT/OT when able      Compression fracture of body of thoracic vertebra  Chronic.  -PRN analgesics  -PT/OT when able      Contusion of lung, closed, initial encounter  Secondary to fall.  -PRN analgesics      Hyponatremia  -Continue to trend Na      Primary hypertension  -Hold home BP medications with exception of home diltiazem  -Continue to monitor      Cardiac pacemaker in situ  History of SSS. Stable.      Multiple closed fractures of ribs of right side  8th and 9th rib fracture. Stable.  -PRN analgesics        VTE Risk Mitigation (From admission, onward)         Ordered     IP VTE HIGH RISK PATIENT  Once         07/13/23 1650     Place sequential compression device  Until discontinued         07/13/23 1650                Discharge Planning   QUIQUE: 7/18/2023     Code Status: Full Code   Is the patient medically ready for discharge?:     Reason for patient still in hospital (select all that apply): Patient trending condition, Laboratory test, Treatment, Imaging, Consult recommendations, PT / OT recommendations and Pending disposition  Discharge Plan A: Home with family, Home Health                  Ramón Mario MD  Department of Hospital Medicine   [facility]

## 2023-07-14 NOTE — ASSESSMENT & PLAN NOTE
-Chest tube placed by Dr. Baker in the ED  -Hold home anticoagulation  -Trend CBC  -Pulmonary consulted  -Serial CXRs

## 2023-07-14 NOTE — HOSPITAL COURSE
Alvina Guallpa is an with chronic thoracic, lumbar, left femur and pelvic fractures who presented with a mechanical fall leading to right sided 8th and 9th rib fractures with a hemopneumothorax.   She had a chest tube placed in the ED upon transfer from [place]. Her Hgb fell to 7 as there was significant output from the chest tube once placed. She received one unit of FFP and one unit of PRBCs. Home Xarelto was held.   Hgb is being trended and has improved; output from the chest tube is also decreased.   Pulmonary consulted and later stable on room air    Daily serial CXRs show progressive improvement. Troponin is also being trended given mild elevation with TWIs at the lateral leads.   Cardiology reviewed as well and advised outpatient stress test .   Later patient sable and DC to SNF .

## 2023-07-14 NOTE — PROGRESS NOTES
Progress Note  PULMONARY    Admit Date: 7/13/2023 07/14/2023  History of Present Illness:  Pt is an 82 yo female with right side hemopneumothorax after a fall at home. She is a poor historian but does remember she has heart disease. She is on eliquis- new med 2 wks for atrial fib- last dose wed am. She endorses pain on the right side of her back, better s/p pain meds.. She was admitted at [facility] yesterday and transferred to [facility] ED for higher level of care. She underwent right side chest tube placement in ED and is being transferred to ICU.    SUBJECTIVE:     7/14- has been stable overnight. Hgb downtrending to 7. pt receiving 1 unit pRBCs. She states pain R back is 4-5/10. No other complaints. No SOB or cough. Chest tube has so far drained 600mL bloody output      OBJECTIVE:     Vitals (Most recent):  Vitals:    07/14/23 0701   BP: 137/66   Pulse: 106   Resp: 15   Temp: 98.4 °F (36.9 °C)       Vitals (24 hour range):  Temp:  [97.5 °F (36.4 °C)-98.4 °F (36.9 °C)]   Pulse:  []   Resp:  [10-33]   BP: (106-174)/(59-86)   SpO2:  [95 %-100 %]       Intake/Output Summary (Last 24 hours) at 7/14/2023 0824  Last data filed at 7/14/2023 0436  Gross per 24 hour   Intake 254 ml   Output 740 ml   Net -486 ml          Physical Exam:  The patient's neuro status (alertness,orientation,cognitive function,motor skills,), pharyngeal exam (oral lesions, hygiene, abn dentition,), Neck (jvd,mass,thyroid,nodes in neck and above/below clavicle),RESPIRATORY(symmetry,effort,fremitus,percussion,auscultation),  Cor(rhythm,heart tones including gallops,perfusion,edema)ABD(distention,hepatic&splenomegaly,tenderness,masses), Skin(rash,cyanosis),Psyc(affect,judgement,).  Exam negative except for these pertinent findings:    Awake, alert, oriented x2  Oropharynx clear  HR regular, tachycardic  Breath sounds clear on left, diminished R base, R chest tube in place bandaged, no air leak, draining sanguinous  Abd soft nontender  Wearing  diaper  No edema    Radiographs reviewed: view by direct vision   CXR 7/14- R chest tube in place tip projects toward mediastinum, improved aeration R lower lung. Tiny apical pneumothorax visible      Labs     Recent Labs   Lab 07/14/23  0408   WBC 9.12   HGB 7.0*   HCT 21.2*        Recent Labs   Lab 07/13/23  0833 07/13/23  1128 07/13/23 2047 07/14/23  0408   *  --   --  134*   K 4.3  --   --  4.3     --   --  101   CO2 22*  --   --  28   BUN 23  --   --  22   CREATININE 0.8  --   --  0.6     --   --  110   CALCIUM 8.6*  --   --  8.5*   MG  --   --   --  1.8   AST 26  --   --  23   ALT 13  --   --  15   ALKPHOS 58  --   --  45*   BILITOT 0.9  --   --  0.9   PROT 5.6*  --   --  5.1*   ALBUMIN 3.5  --   --  3.1*   INR  --   --  1.0  --    LACTATE 2.1  --   --   --    TROPONINI 0.037* 0.013  --   --    No results for input(s): PH, PCO2, PO2, HCO3 in the last 24 hours.  Microbiology Results (last 7 days)       ** No results found for the last 168 hours. **            Impression:  Active Hospital Problems    Diagnosis  POA    *Traumatic hemopneumothorax, initial encounter [S27.2XXA]  Yes    Closed fracture of ramus of left pubis [S32.592A]  Unknown    Fall [W19.XXXA]  Unknown    Troponin level elevated [R77.8]  Unknown    Multiple closed fractures of ribs of right side [S22.41XA]  Yes    Cardiac pacemaker in situ [Z95.0]  Yes    Primary hypertension [I10]  Yes    Acute posthemorrhagic anemia [D62]  Yes    Contusion of lung, closed, initial encounter [S27.329A]  Yes    Compression fracture of body of thoracic vertebra [S22.000A]  Yes      Resolved Hospital Problems   No resolved problems to display.               Plan:         - trend H/H  - transfuse for Hgb goal >8  - pain control  - monitor chest tube output, continue to suction  - daily CXR while chest tube in place  - can hold off on surgery consult and manage conservatively  - hold anticoagulation  - she will need follow up imaging for  RLL cavitary lung mass vs parenchymal hematoma  - start cardiac diet  - keep in ICU 1 more day then could transfer out if Hgb stable  - d/w granddaughter Abbi over phone      The following were evaluated and adjusted as needed: support tubes and access lines and invasive monitoring and input and output and renal status     Critical Care  - THE PATIENT HAS A HIGH PROBABILITY OF IMMINENT OR LIFE THREATENING DETERIORATION.  Over 50%time of encounter was in direct care at bedside.  Time was 30 to 74 minutes required for patient care.  Time needed for all of the above totaled 36 minutes.    Shirley Dhaliwal MD  Pulmonary & Critical Care Medicine

## 2023-07-14 NOTE — PLAN OF CARE
[facility]  Initial Discharge Assessment       Primary Care Provider: EDY Hurd    Admission Diagnosis: Pneumothorax [J93.9]    Admission Date: 7/13/2023  Expected Discharge Date:   Pt confirmed home address and lives with Granddaughter Abbi Guallpa [phone]. Pt stated that she has ample DME including WC, Walker , Bathroom DME. Pt has had HH in the past but is not active with anyone now. Pts granddaughter or friend provides transportation as needed. Pt verified PCP, insurance and pharmacy as [facility]. Pt was transferred from [facility] and stated that family will provide transport at discharge. CM following for additional needs.     Transition of Care Barriers: None    Payor: HU[employer] MANAGED MEDICARE / Plan: HU[employer] MEDICARE PPO / Product Type: Medicare Advantage /     Extended Emergency Contact Information  Primary Emergency Contact: Abbi Guallpa  Address: [address]           [place] [address] [place]  Mobile Phone: [phone]  Relation: Grandchild  Secondary Emergency Contact: Greg Guallpaon  Address: [address]           [place] [address] [place]  Mobile Phone: [phone]  Relation: Grandchild  Preferred language: English   needed? No    Discharge Plan A: Home with family, Home Health  Discharge Plan B: Skilled Nursing Facility      [id number] [place] [address] [address]  [address] [facility] [address] [facility] [address]  [facility] [address]  Phone: [phone] Fax: [phone]      Initial Assessment (most recent)       Adult Discharge Assessment - 07/14/23 1016          Discharge Assessment    Assessment Type Discharge Planning Assessment     Confirmed/corrected address, phone number and insurance Yes     Confirmed Demographics Correct on Facesheet     Source of Information patient     Communicated QUIQUE with patient/caregiver Yes     Reason For Admission Pneumothorax     People in  Home grandchild(azul)     Facility Arrived From: [facility]     Do you expect to return to your current living situation? Yes     Do you have help at home or someone to help you manage your care at home? Yes     Who are your caregiver(s) and their phone number(s)? daolaf Guallpa [phone]     Prior to hospitilization cognitive status: Alert/Oriented     Current cognitive status: Alert/Oriented     Walking or Climbing Stairs ambulation difficulty, requires equipment     Mobility Management wc     Dressing/Bathing bathing difficulty, requires equipment     Do you have any problems with: Needs other help     Home Accessibility wheelchair accessible     Home Layout Able to live on 1st floor     Equipment Currently Used at Home walker, standard;walker, rolling;bedside commode     Readmission within 30 days? No     Patient currently being followed by outpatient case management? No     Do you currently have service(s) that help you manage your care at home? No     Do you take prescription medications? Yes     Do you have prescription coverage? Yes     Do you have any problems affording any of your prescribed medications? No     Is the patient taking medications as prescribed? yes     Who is going to help you get home at discharge? daolaf Guallpa [phone]     How do you get to doctors appointments? family or friend will provide     Are you on dialysis? No     Do you take coumadin? No     Discharge Plan A Home with family;Home Health     Discharge Plan B Skilled Nursing Facility     DME Needed Upon Discharge  none     Discharge Plan discussed with: Patient     Transition of Care Barriers None        Physical Activity    On average, how many days per week do you engage in moderate to strenuous exercise (like a brisk walk)? Patient refused     On average, how many minutes do you engage in exercise at this level? Patient refused        Financial Resource Strain    How hard is it for you to pay  for the very basics like food, housing, medical care, and heating? Not hard at all        Housing Stability    In the last 12 months, was there a time when you were not able to pay the mortgage or rent on time? No     In the last 12 months, was there a time when you did not have a steady place to sleep or slept in a shelter (including now)? No        Transportation Needs    In the past 12 months, has lack of transportation kept you from medical appointments or from getting medications? No     In the past 12 months, has lack of transportation kept you from meetings, work, or from getting things needed for daily living? No        Food Insecurity    Within the past 12 months, you worried that your food would run out before you got the money to buy more. Never true     Within the past 12 months, the food you bought just didn't last and you didn't have money to get more. Never true        Stress    Do you feel stress - tense, restless, nervous, or anxious, or unable to sleep at night because your mind is troubled all the time - these days? Patient refused        Social Connections    In a typical week, how many times do you talk on the phone with family, friends, or neighbors? Patient refused     How often do you get together with friends or relatives? Patient refused     How often do you attend Restoration or Cheondoism services? Patient refused     Do you belong to any clubs or organizations such as Restoration groups, unions, fraternal or athletic groups, or school groups? Patient refused     How often do you attend meetings of the clubs or organizations you belong to? Patient refused     Are you , , , , never , or living with a partner? Patient refused        Alcohol Use    Q1: How often do you have a drink containing alcohol? Patient refused     Q2: How many drinks containing alcohol do you have on a typical day when you are drinking? Patient refused     Q3: How often do you have six or  more drinks on one occasion? Patient refused        OTHER    Name(s) of People in Home Granddaughter Abbi Ramu [phone]

## 2023-07-14 NOTE — CARE UPDATE
07/13/23 2100   Patient Assessment/Suction   Level of Consciousness (AVPU) alert   Respiratory Effort Normal;Unlabored   Expansion/Accessory Muscles/Retractions no use of accessory muscles   All Lung Fields Breath Sounds equal bilaterally;diminished   PRE-TX-O2   Device (Oxygen Therapy) nasal cannula with humidification   Flow (L/min) 5   SpO2 100 %   Pulse Oximetry Type Continuous   $ Pulse Oximetry - Multiple Charge Pulse Oximetry - Multiple   Education   $ Education DME Oxygen;15 min   Transport Patient   $ Transport Tech Time Charge 30 min   Oxygen Method Nasal cannula   Transport to CT   Toleration Good   Respiratory Evaluation   $ Care Plan Tech Time 15 min   $ Eval/Re-eval Charges Evaluation   Evaluation For New Orders

## 2023-07-14 NOTE — SUBJECTIVE & OBJECTIVE
"Interval History: see "Hospital Course"    Review of Systems   Respiratory:  Negative for shortness of breath.    Cardiovascular:  Negative for chest pain.   Objective:     Vital Signs (Most Recent):  Temp: 98.2 °F (36.8 °C) (07/14/23 1101)  Pulse: 105 (07/14/23 1101)  Resp: 17 (07/14/23 1101)  BP: (!) 155/75 (07/14/23 1101)  SpO2: 99 % (07/14/23 1101) Vital Signs (24h Range):  Temp:  [97.5 °F (36.4 °C)-98.4 °F (36.9 °C)] 98.2 °F (36.8 °C)  Pulse:  [] 105  Resp:  [13-30] 17  SpO2:  [98 %-100 %] 99 %  BP: (106-174)/(59-84) 155/75     Weight: 47.6 kg (104 lb 15 oz)  Body mass index is 18.01 kg/m².    Intake/Output Summary (Last 24 hours) at 7/14/2023 1626  Last data filed at 7/14/2023 1315  Gross per 24 hour   Intake 590.67 ml   Output 860 ml   Net -269.33 ml         Physical Exam  Vitals and nursing note reviewed.   Constitutional:       Appearance: She is ill-appearing.   HENT:      Head: Normocephalic and atraumatic.      Right Ear: External ear normal.      Left Ear: External ear normal.      Nose: Nose normal.      Mouth/Throat:      Mouth: Mucous membranes are moist.      Pharynx: Oropharynx is clear.   Eyes:      Extraocular Movements: Extraocular movements intact.      Conjunctiva/sclera: Conjunctivae normal.   Cardiovascular:      Rate and Rhythm: Regular rhythm. Tachycardia present.      Pulses: Normal pulses.      Heart sounds: Normal heart sounds.   Pulmonary:      Effort: Pulmonary effort is normal. No respiratory distress.      Breath sounds: No rales.      Comments: Decreased breath sounds at the bases. NC O2. Chest tube right base with ~700 cc serosanguinous fluid.  Abdominal:      General: Bowel sounds are normal.      Palpations: Abdomen is soft.   Musculoskeletal:         General: Normal range of motion.      Cervical back: Normal range of motion and neck supple.      Right lower leg: No edema.      Left lower leg: No edema.   Skin:     Coloration: Skin is pale.   Neurological:      Mental " Status: She is alert. Mental status is at baseline.   Psychiatric:         Mood and Affect: Mood normal.         Behavior: Behavior normal.           Significant Labs: All pertinent labs within the past 24 hours have been reviewed.    Significant Imaging: I have reviewed all pertinent imaging results/findings within the past 24 hours.

## 2023-07-15 ENCOUNTER — CLINICAL SUPPORT (OUTPATIENT)
Dept: CARDIOLOGY | Facility: HOSPITAL | Age: 83
DRG: 964 | End: 2023-07-15
Attending: STUDENT IN AN ORGANIZED HEALTH CARE EDUCATION/TRAINING PROGRAM
Payer: MEDICARE

## 2023-07-15 VITALS — HEIGHT: 64 IN | WEIGHT: 104 LBS | BODY MASS INDEX: 17.75 KG/M2

## 2023-07-15 LAB
ALBUMIN SERPL BCP-MCNC: 2.8 G/DL (ref 3.5–5.2)
ALP SERPL-CCNC: 44 U/L (ref 55–135)
ALT SERPL W/O P-5'-P-CCNC: 15 U/L (ref 10–44)
ANION GAP SERPL CALC-SCNC: 5 MMOL/L (ref 8–16)
AORTIC ROOT ANNULUS: 2.9 CM
AORTIC VALVE CUSP SEPERATION: 1.7 CM
AST SERPL-CCNC: 20 U/L (ref 10–40)
AV INDEX (PROSTH): 0.37
AV MEAN GRADIENT: 14 MMHG
AV PEAK GRADIENT: 24 MMHG
AV REGURGITATION PRESSURE HALF TIME: 457 MS
AV VALVE AREA: 1.17 CM2
AV VELOCITY RATIO: 0.4
BASOPHILS # BLD AUTO: 0.04 K/UL (ref 0–0.2)
BASOPHILS NFR BLD: 0.5 % (ref 0–1.9)
BILIRUB SERPL-MCNC: 1 MG/DL (ref 0.1–1)
BSA FOR ECHO PROCEDURE: 1.46 M2
BUN SERPL-MCNC: 19 MG/DL (ref 8–23)
CALCIUM SERPL-MCNC: 8 MG/DL (ref 8.7–10.5)
CHLORIDE SERPL-SCNC: 99 MMOL/L (ref 95–110)
CO2 SERPL-SCNC: 30 MMOL/L (ref 23–29)
CREAT SERPL-MCNC: 0.5 MG/DL (ref 0.5–1.4)
CV ECHO LV RWT: 0.44 CM
DIFFERENTIAL METHOD: ABNORMAL
DOP CALC AO PEAK VEL: 2.47 M/S
DOP CALC AO VTI: 36.1 CM
DOP CALC LVOT AREA: 3.1 CM2
DOP CALC LVOT DIAMETER: 2 CM
DOP CALC LVOT PEAK VEL: 1 M/S
DOP CALC LVOT STROKE VOLUME: 42.08 CM3
DOP CALC MV VTI: 33.9 CM
DOP CALCLVOT PEAK VEL VTI: 13.4 CM
E WAVE DECELERATION TIME: 215 MSEC
E/A RATIO: 0.7
E/E' RATIO: 12.44 M/S
ECHO LV POSTERIOR WALL: 0.86 CM (ref 0.6–1.1)
EJECTION FRACTION: 75 %
EOSINOPHIL # BLD AUTO: 0.1 K/UL (ref 0–0.5)
EOSINOPHIL NFR BLD: 1.5 % (ref 0–8)
ERYTHROCYTE [DISTWIDTH] IN BLOOD BY AUTOMATED COUNT: 13.6 % (ref 11.5–14.5)
EST. GFR  (NO RACE VARIABLE): >60 ML/MIN/1.73 M^2
FRACTIONAL SHORTENING: 27 % (ref 28–44)
GLUCOSE SERPL-MCNC: 103 MG/DL (ref 70–110)
HCT VFR BLD AUTO: 26.4 % (ref 37–48.5)
HGB BLD-MCNC: 9 G/DL (ref 12–16)
IMM GRANULOCYTES # BLD AUTO: 0.03 K/UL (ref 0–0.04)
IMM GRANULOCYTES NFR BLD AUTO: 0.4 % (ref 0–0.5)
INTERVENTRICULAR SEPTUM: 0.96 CM (ref 0.6–1.1)
LEFT ATRIUM SIZE: 3.6 CM
LEFT ATRIUM VOLUME INDEX MOD: 40.7 ML/M2
LEFT ATRIUM VOLUME MOD: 60.3 CM3
LEFT INTERNAL DIMENSION IN SYSTOLE: 2.85 CM (ref 2.1–4)
LEFT VENTRICLE DIASTOLIC VOLUME INDEX: 44.53 ML/M2
LEFT VENTRICLE DIASTOLIC VOLUME: 65.9 ML
LEFT VENTRICLE MASS INDEX: 72 G/M2
LEFT VENTRICLE SYSTOLIC VOLUME INDEX: 20.9 ML/M2
LEFT VENTRICLE SYSTOLIC VOLUME: 30.9 ML
LEFT VENTRICULAR INTERNAL DIMENSION IN DIASTOLE: 3.9 CM (ref 3.5–6)
LEFT VENTRICULAR MASS: 106.95 G
LV LATERAL E/E' RATIO: 10.18 M/S
LV SEPTAL E/E' RATIO: 16 M/S
LVOT MG: 2 MMHG
LVOT MV: 0.65 CM/S
LYMPHOCYTES # BLD AUTO: 1.4 K/UL (ref 1–4.8)
LYMPHOCYTES NFR BLD: 17 % (ref 18–48)
MAGNESIUM SERPL-MCNC: 2.1 MG/DL (ref 1.6–2.6)
MCH RBC QN AUTO: 31.7 PG (ref 27–31)
MCHC RBC AUTO-ENTMCNC: 34.1 G/DL (ref 32–36)
MCV RBC AUTO: 93 FL (ref 82–98)
MONOCYTES # BLD AUTO: 0.8 K/UL (ref 0.3–1)
MONOCYTES NFR BLD: 10.2 % (ref 4–15)
MV MEAN GRADIENT: 6 MMHG
MV PEAK A VEL: 1.6 M/S
MV PEAK E VEL: 1.12 M/S
MV PEAK GRADIENT: 15 MMHG
MV STENOSIS PRESSURE HALF TIME: 73 MS
MV VALVE AREA BY CONTINUITY EQUATION: 1.24 CM2
MV VALVE AREA P 1/2 METHOD: 3.01 CM2
NEUTROPHILS # BLD AUTO: 5.6 K/UL (ref 1.8–7.7)
NEUTROPHILS NFR BLD: 70.4 % (ref 38–73)
NRBC BLD-RTO: 0 /100 WBC
OHS LV EJECTION FRACTION SIMPSONS BIPLANE MOD: 5 %
PISA AR MAX VEL: 4.15 M/S
PISA MRMAX VEL: 5.69 M/S
PISA TR MAX VEL: 3.22 M/S
PLATELET # BLD AUTO: 143 K/UL (ref 150–450)
PMV BLD AUTO: 12.6 FL (ref 9.2–12.9)
POTASSIUM SERPL-SCNC: 4.1 MMOL/L (ref 3.5–5.1)
PROT SERPL-MCNC: 4.7 G/DL (ref 6–8.4)
PV MV: 0.69 M/S
PV PEAK VELOCITY: 1.03 CM/S
RBC # BLD AUTO: 2.84 M/UL (ref 4–5.4)
RV TISSUE DOPPLER FREE WALL SYSTOLIC VELOCITY 1 (APICAL 4 CHAMBER VIEW): 24.1 CM/S
SINUS: 2.37 CM
SODIUM SERPL-SCNC: 134 MMOL/L (ref 136–145)
STJ: 2.35 CM
TDI LATERAL: 0.11 M/S
TDI SEPTAL: 0.07 M/S
TDI: 0.09 M/S
TR MAX PG: 41 MMHG
TRICUSPID ANNULAR PLANE SYSTOLIC EXCURSION: 2.54 CM
TROPONIN I SERPL HS-MCNC: 20.6 PG/ML (ref 0–14.9)
WBC # BLD AUTO: 7.98 K/UL (ref 3.9–12.7)

## 2023-07-15 PROCEDURE — 25000003 PHARM REV CODE 250: Performed by: STUDENT IN AN ORGANIZED HEALTH CARE EDUCATION/TRAINING PROGRAM

## 2023-07-15 PROCEDURE — 99223 1ST HOSP IP/OBS HIGH 75: CPT | Mod: ,,, | Performed by: INTERNAL MEDICINE

## 2023-07-15 PROCEDURE — 93306 TTE W/DOPPLER COMPLETE: CPT

## 2023-07-15 PROCEDURE — 94761 N-INVAS EAR/PLS OXIMETRY MLT: CPT

## 2023-07-15 PROCEDURE — 99223 PR INITIAL HOSPITAL CARE,LEVL III: ICD-10-PCS | Mod: ,,, | Performed by: INTERNAL MEDICINE

## 2023-07-15 PROCEDURE — 20000000 HC ICU ROOM

## 2023-07-15 PROCEDURE — 93010 ELECTROCARDIOGRAM REPORT: CPT | Mod: ,,, | Performed by: SPECIALIST

## 2023-07-15 PROCEDURE — 97161 PT EVAL LOW COMPLEX 20 MIN: CPT

## 2023-07-15 PROCEDURE — 36415 COLL VENOUS BLD VENIPUNCTURE: CPT | Performed by: STUDENT IN AN ORGANIZED HEALTH CARE EDUCATION/TRAINING PROGRAM

## 2023-07-15 PROCEDURE — 93306 TTE W/DOPPLER COMPLETE: CPT | Mod: 26,,, | Performed by: INTERNAL MEDICINE

## 2023-07-15 PROCEDURE — 99233 SBSQ HOSP IP/OBS HIGH 50: CPT | Mod: ,,, | Performed by: INTERNAL MEDICINE

## 2023-07-15 PROCEDURE — 80053 COMPREHEN METABOLIC PANEL: CPT | Performed by: STUDENT IN AN ORGANIZED HEALTH CARE EDUCATION/TRAINING PROGRAM

## 2023-07-15 PROCEDURE — 94760 N-INVAS EAR/PLS OXIMETRY 1: CPT

## 2023-07-15 PROCEDURE — 99900035 HC TECH TIME PER 15 MIN (STAT)

## 2023-07-15 PROCEDURE — 85025 COMPLETE CBC W/AUTO DIFF WBC: CPT | Performed by: STUDENT IN AN ORGANIZED HEALTH CARE EDUCATION/TRAINING PROGRAM

## 2023-07-15 PROCEDURE — 25000003 PHARM REV CODE 250: Performed by: INTERNAL MEDICINE

## 2023-07-15 PROCEDURE — 97116 GAIT TRAINING THERAPY: CPT

## 2023-07-15 PROCEDURE — 94799 UNLISTED PULMONARY SVC/PX: CPT

## 2023-07-15 PROCEDURE — 93005 ELECTROCARDIOGRAM TRACING: CPT | Performed by: SPECIALIST

## 2023-07-15 PROCEDURE — 99233 PR SUBSEQUENT HOSPITAL CARE,LEVL III: ICD-10-PCS | Mod: ,,, | Performed by: INTERNAL MEDICINE

## 2023-07-15 PROCEDURE — 84484 ASSAY OF TROPONIN QUANT: CPT | Performed by: STUDENT IN AN ORGANIZED HEALTH CARE EDUCATION/TRAINING PROGRAM

## 2023-07-15 PROCEDURE — 93306 ECHO (CUPID ONLY): ICD-10-PCS | Mod: 26,,, | Performed by: INTERNAL MEDICINE

## 2023-07-15 PROCEDURE — 83735 ASSAY OF MAGNESIUM: CPT | Performed by: STUDENT IN AN ORGANIZED HEALTH CARE EDUCATION/TRAINING PROGRAM

## 2023-07-15 PROCEDURE — 99900031 HC PATIENT EDUCATION (STAT)

## 2023-07-15 PROCEDURE — 93010 EKG 12-LEAD: ICD-10-PCS | Mod: ,,, | Performed by: SPECIALIST

## 2023-07-15 RX ORDER — HYDROCODONE BITARTRATE AND ACETAMINOPHEN 5; 325 MG/1; MG/1
1 TABLET ORAL EVERY 4 HOURS PRN
Status: DISCONTINUED | OUTPATIENT
Start: 2023-07-15 | End: 2023-07-25 | Stop reason: HOSPADM

## 2023-07-15 RX ADMIN — MUPIROCIN 1 G: 20 OINTMENT TOPICAL at 08:07

## 2023-07-15 RX ADMIN — DILTIAZEM HYDROCHLORIDE 120 MG: 120 CAPSULE, COATED, EXTENDED RELEASE ORAL at 09:07

## 2023-07-15 RX ADMIN — HYDROCODONE BITARTRATE AND ACETAMINOPHEN 1 TABLET: 5; 325 TABLET ORAL at 10:07

## 2023-07-15 RX ADMIN — ATORVASTATIN CALCIUM 10 MG: 10 TABLET, FILM COATED ORAL at 09:07

## 2023-07-15 RX ADMIN — HYDROCODONE BITARTRATE AND ACETAMINOPHEN 1 TABLET: 5; 325 TABLET ORAL at 06:07

## 2023-07-15 RX ADMIN — TRAZODONE HYDROCHLORIDE 50 MG: 50 TABLET ORAL at 08:07

## 2023-07-15 RX ADMIN — HYDROCODONE BITARTRATE AND ACETAMINOPHEN 1 TABLET: 5; 325 TABLET ORAL at 04:07

## 2023-07-15 RX ADMIN — BACLOFEN 10 MG: 10 TABLET ORAL at 01:07

## 2023-07-15 RX ADMIN — MUPIROCIN 1 G: 20 OINTMENT TOPICAL at 09:07

## 2023-07-15 RX ADMIN — HYDROCODONE BITARTRATE AND ACETAMINOPHEN 1 TABLET: 5; 325 TABLET ORAL at 09:07

## 2023-07-15 RX ADMIN — HYDROCODONE BITARTRATE AND ACETAMINOPHEN 1 TABLET: 5; 325 TABLET ORAL at 01:07

## 2023-07-15 NOTE — PLAN OF CARE
Problem: Skin Injury Risk Increased  Goal: Skin Health and Integrity  Intervention: Promote and Optimize Oral Intake  Flowsheets (Taken 7/15/2023 1244)  Oral Nutrition Promotion: calorie-dense liquids provided     Problem: Oral Intake Inadequate  Goal: Improved Oral Intake  Outcome: Ongoing, Progressing  Intervention: Promote and Optimize Oral Intake  Flowsheets (Taken 7/15/2023 1244)  Oral Nutrition Promotion: calorie-dense liquids provided

## 2023-07-15 NOTE — ASSESSMENT & PLAN NOTE
Mildly elevated. Stable. EKG shows TWIs at lateral leads. May be secondary to acute trauma.   -Continue telemetry  -Trend troponin  -Consider Cardiology consultation

## 2023-07-15 NOTE — PROGRESS NOTES
"[facility]  Adult Nutrition   Progress Note (Initial Assessment)     SUMMARY     Recommendations  1) Rd has added Ensure HP to meals to assist in meeting nutritional needs.   2) Continue current cardiac diet as tolerated.    Goals:   Pt to continue to meet 100% of her EEN/EPN.    Nutrition Goal Status: progressing towards goal    Communication of RD Recs: reviewed with RN    Dietitian Rounds Brief  LOS: Pts nurse tells me that pt is "not eating. just drinking ensures". Pt has "no appetite" so will continue to encourage increased PO intake. Her LBM was on 7/12/2023. Will continue to follow prn.    Diet order:   Current Diet Order: Cardiac     Oral Nutrition Supplement: Ensure HP with meals     Evaluation of Received Nutrient/Fluid Intake  Energy Calories Required: not meeting needs  Protein Required: not meeting needs  Fluid Required: meeting needs  Tolerance: not tolerating     % Intake of Estimated Energy Needs: 50 - 75 %  % Meal Intake: 50 - 75 %      Intake/Output Summary (Last 24 hours) at 7/15/2023 1237  Last data filed at 7/15/2023 0800  Gross per 24 hour   Intake 490 ml   Output 1450 ml   Net -960 ml        Anthropometrics  Temp: 98.2 °F (36.8 °C)  Height Method: Stated  Height: 5' 4" (162.6 cm)  Height (inches): 64 in  Weight Method: Bed Scale  Weight: 47.6 kg (104 lb 15 oz)  Weight (lb): 104.94 lb  Ideal Body Weight (IBW), Female: 120 lb  % Ideal Body Weight, Female (lb): 87.45 %  BMI (Calculated): 18  BMI Grade: 17 - 18.4 protein-energy malnutrition grade I       Estimated/Assessed Needs  Weight Used For Calorie Calculations: 47.6 kg (104 lb 15 oz)  Energy Calorie Requirements (kcal): 5400-0366 kcals/day (30-35 kcals/kg ABW)  Energy Need Method: Kcal/kg  Protein Requirements: 66-83 g/day (1.2-1.5 g/kg IBW)  Weight Used For Protein Calculations: 55 kg (121 lb 4.1 oz)     Estimated Fluid Requirement Method: RDA Method  RDA Method (mL): 1428       Reason for Assessment  Reason For Assessment: " length of stay  Diagnosis: other (see comments) (Traumatic hemopneumothorax, initial encounter)  Relevant Medical History: Hypertension, Osteoporosis, Depression, Anxiety, Seasonal allergies, Chronic neck pain, TIA (transient ischemic attack)  Interdisciplinary Rounds: did not attend    Nutrition/Diet History  Food Allergies: NKFA  Factors Affecting Nutritional Intake: decreased appetite    Nutrition Risk Screen  Nutrition Risk Screen: no indicators present     MST Score: 1  Have you recently lost weight without trying?: No  Weight loss score: 0  Have you been eating poorly because of a decreased appetite?: Yes  Appetite score: 1       Weight History:  Wt Readings from Last 5 Encounters:   07/13/23 47.6 kg (104 lb 15 oz)   07/12/23 46.7 kg (103 lb)   06/22/23 52.2 kg (114 lb 15.9 oz)   11/11/22 46.3 kg (102 lb)   09/14/22 47.6 kg (105 lb)        Lab/Procedures/Meds: Pertinent Labs/Meds Reviewed    Medications:Pertinent Medications Reviewed  Scheduled Meds:   atorvastatin  10 mg Oral Daily    diltiaZEM  120 mg Oral Daily    mupirocin   Nasal BID    traZODone  50 mg Oral QHS     Continuous Infusions:  PRN Meds:.acetaminophen, baclofen, HYDROcodone-acetaminophen, morphine, naloxone, ondansetron, prochlorperazine, sodium chloride 0.9%    Labs: Pertinent Labs Reviewed  Clinical Chemistry:  Recent Labs   Lab 07/13/23  0458 07/13/23  0833 07/15/23  0338   *   < > 134*   K 5.1   < > 4.1   CL 97   < > 99   CO2 24   < > 30*      < > 103   BUN 24*   < > 19   CREATININE 0.9   < > 0.5   CALCIUM 8.7   < > 8.0*   PROT  --    < > 4.7*   ALBUMIN  --    < > 2.8*   BILITOT  --    < > 1.0   ALKPHOS  --    < > 44*   AST  --    < > 20   ALT  --    < > 15   ANIONGAP 9   < > 5*   MG 1.7   < > 2.1   PHOS 4.3  --   --     < > = values in this interval not displayed.     CBC:   Recent Labs   Lab 07/15/23  0021   WBC 7.98   RBC 2.84*   HGB 9.0*   HCT 26.4*   *   MCV 93   MCH 31.7*   MCHC 34.1     Cardiac Profile:  Recent  Labs   Lab 07/13/23  0158 07/13/23  0833 07/13/23  1128   *  --   --    TROPONINI  --  0.037* 0.013       Monitor and Evaluation  Food and Nutrient Intake: food and beverage intake, energy intake  Food and Nutrient Adminstration: diet order  Knowledge/Beliefs/Attitudes: food and nutrition knowledge/skill, beliefs and attitudes  Physical Activity and Function: nutrition-related ADLs and IADLs, factors affecting access to physical activity  Anthropometric Measurements: weight, weight change, body mass index  Biochemical Data, Medical Tests and Procedures: lipid profile, inflammatory profile, glucose/endocrine profile, gastrointestinal profile, electrolyte and renal panel  Nutrition-Focused Physical Findings: overall appearance     Nutrition Risk  Level of Risk/Frequency of Follow-up: moderate     Nutrition Follow-Up  RD Follow-up?: Yes      Janette Moraes, MEEK 07/15/2023 12:37 PM

## 2023-07-15 NOTE — PROGRESS NOTES
Progress Note  PULMONARY    Admit Date: 7/13/2023   07/15/2023  History of Present Illness:  Pt is an 84 yo female with right side hemopneumothorax after a fall at home. She is a poor historian but does remember she has heart disease. She is on eliquis- new med 2 wks for atrial fib- last dose wed am. She endorses pain on the right side of her back, better s/p pain meds.. She was admitted at [facility] yesterday and transferred to [facility] ED for higher level of care. She underwent right side chest tube placement in ED and is being transferred to ICU.    SUBJECTIVE:     7/14- has been stable overnight. Hgb downtrending to 7. pt receiving 1 unit pRBCs. She states pain R back is 4-5/10. No other complaints. No SOB or cough. Chest tube has so far drained 600mL bloody output    7/15 the chest tube continues to drain as much fluid around it as it does through it.  Do not see any residual pneumothorax on today's film.     OBJECTIVE:     Vitals (Most recent):  Vitals:    07/15/23 0850   BP:    Pulse: 91   Resp: (!) 23   Temp:        Vitals (24 hour range):  Temp:  [98 °F (36.7 °C)-98.3 °F (36.8 °C)]   Pulse:  []   Resp:  [13-28]   BP: ()/(52-76)   SpO2:  [99 %-100 %]       Intake/Output Summary (Last 24 hours) at 7/15/2023 0907  Last data filed at 7/15/2023 0800  Gross per 24 hour   Intake 826.67 ml   Output 1480 ml   Net -653.33 ml          PHYSICAL EXAM  GENERAL: Older patient in no distress.  HEENT: Pupils equal and reactive. Extraocular movements intact. Nose intact.   Pharynx moist.  NECK: Supple.   HEART: Regular rate and rhythm. No murmur or gallop auscultated.  LUNGS:  Crackles in both bases, right greater than left. Lung excursion symmetrical. No change in fremitus.  Thoracostomy tube:  Draining serosanguineous  ABDOMEN: Bowel sounds present. Non-tender, no masses palpated.  : Normal anatomy.  EXTREMITIES: Normal muscle tone and joint movement, no cyanosis or clubbing.   LYMPHATICS: No adenopathy palpated, no  edema.  SKIN: Dry, intact, no lesions.   NEURO: Cranial nerves II-XII intact. Motor strength 5/5 bilaterally, upper and lower extremities.  PSYCH: Appropriate affect.      Radiographs reviewed: view by direct vision   CXR 7/14- R chest tube in place tip projects toward mediastinum, improved aeration R lower lung. Tiny apical pneumothorax visible   7/15 chest x-ray is unchanged    Labs     Recent Labs   Lab 07/15/23  0021   WBC 7.98   HGB 9.0*   HCT 26.4*   *     Recent Labs   Lab 07/15/23  0338   *   K 4.1   CL 99   CO2 30*   BUN 19   CREATININE 0.5      CALCIUM 8.0*   MG 2.1   AST 20   ALT 15   ALKPHOS 44*   BILITOT 1.0   PROT 4.7*   ALBUMIN 2.8*   No results for input(s): PH, PCO2, PO2, HCO3 in the last 24 hours.  Microbiology Results (last 7 days)       ** No results found for the last 168 hours. **            Impression:  Active Hospital Problems    Diagnosis  POA    *Traumatic hemopneumothorax, initial encounter [S27.2XXA]  Yes    Atrial fibrillation [I48.91]  Yes    HLD (hyperlipidemia) [E78.5]  Yes    Lumbar compression fracture [S32.000A]  Yes    Right lower lobe lung mass [R91.8]  Yes    Closed fracture of ramus of left pubis [S32.592A]  Yes    Fall [W19.XXXA]  Yes    Troponin level elevated [R77.8]  Yes    Multiple closed fractures of ribs of right side [S22.41XA]  Yes    Cardiac pacemaker in situ [Z95.0]  Yes    Primary hypertension [I10]  Yes    Acute posthemorrhagic anemia [D62]  Yes    Contusion of lung, closed, initial encounter [S27.329A]  Yes    Compression fracture of body of thoracic vertebra [S22.000A]  Yes    Hyponatremia [E87.1]  Yes      Resolved Hospital Problems   No resolved problems to display.         Traumatic hemopneumothorax  - thoracostomy tube in place  - serosanguineous drainage  - at least as much fluid coming around the thoracostomy tube as through it  - no bronchopleural fistula  Anemia  - hemoglobin lower today  - SCDs instead of  enoxaparin  Thrombocytopenia  - mild  - consumption  Right lower lobe contusion  Multiple rib fractures  - patient having significant pain but will not take morphine  - increase hydrocodone frequency  - patient needs to get out of bed, PT consulted, she walks with a walker at home  - would like to keep in ICU 1 more night because of the drainage around the thoracostomy tube

## 2023-07-15 NOTE — PROGRESS NOTES
Nurses Note -- 4 Eyes      7/13/23  7:08 AM      Skin assessed during: Admit      [x] No Altered Skin Integrity Present    []Prevention Measures Documented      [] Yes- Altered Skin Integrity Present or Discovered   [] LDA Added if Not in Epic (Describe Wound)   [] New Altered Skin Integrity was Present on Admit and Documented in LDA   [] Wound Image Taken    Wound Care Consulted? No    Attending Nurse:  Huy Bacon RN     Second RN/Staff Member:  KAITLIN Alonso

## 2023-07-15 NOTE — CARE UPDATE
07/15/23 0850   Patient Assessment/Suction   Level of Consciousness (AVPU) alert   Respiratory Effort Unlabored   Expansion/Accessory Muscles/Retractions no use of accessory muscles   All Lung Fields Breath Sounds diminished   Rhythm/Pattern, Respiratory unlabored   Cough Frequency no cough   PRE-TX-O2   Device (Oxygen Therapy) room air  (NC TAKEN OF AND ON SB AT BEDSIDE)   SpO2 100 %   Pulse Oximetry Type Continuous   $ Pulse Oximetry - Single Charge Pulse Oximetry - Single   Pulse 91   Resp (!) 23   Incentive Spirometer   $ Incentive Spirometer Charges done with encouragement;proper technique demonstrated   Incentive Spirometer Predicted Level (mL) 1000   Administration (IS) proper technique demonstrated;mouthpiece utilized   Number of Repetitions (IS) 6   Level Incentive Spirometer (mL) 500   Patient Tolerance (IS) good   Education   $ Education Other (see comment);15 min  (IS INSTRUCT)

## 2023-07-15 NOTE — PROGRESS NOTES
[facility] Medicine  Progress Note    Patient Name: Alvina Guallpa  MRN: [id number]  Patient Class: IP- Inpatient   Admission Date: 7/13/2023  Length of Stay: 2 days  Attending Physician: Ramón Mario MD  Primary Care Provider: EDY Hurd        Subjective:     Principal Problem:Traumatic hemopneumothorax, initial encounter        HPI:  Patient is an 83-year-old female with a history of AFib, hypertension, hyperlipidemia, scoliosis with multiple chronic spinal degenerative changes with chronic back pain, history of pelvic fracture and hip fracture in the past who presents after tripping and falling at home.  She was recently replaced on Eliquis by her cardiologist due to episodes of AFib.  She tripped and fell at home onto her right side and developed severe pain in her side.  She went to the ER at [facility] and was evaluated and found to have rib fractures at level 8 and 9 in addition to what appeared to be multiple chronic spinal compression fractures on CT imaging.  CT of the chest reveals hemopneumothorax with small apical pneumothorax.  She was admitted there and monitored with repeat CT scan today showing enlarging hemopneumothorax.    Additionally, she was noted to have EKG changes with mildly elevated troponin though her troponin level trended down.  She is not having any chest pain.  She had an episode of hypotension that improved with IV fluids.    She was transferred per  transfer to [facility] for further evaluation.  EN route, per Pulmonary request the patient was redirected to the ER for further evaluation and chest tube placement.    On arrival in the ED:  The patient's vital signs are stable.  She is mildly tachycardic with heart rate in the low 100s.  She is comfortable and easily communicating.  She says she has back pain that is similar to her chronic back pain.  She has mild right rib pain.  Her pelvis is without pain at this time.  She has no  other complaints.  She is breathing easily.  I discussed with the ED provider and Dr. Baker has graciously assisted with placement of chest tube.  Additionally, Dr Baker discussed with Dr Weiss who will consult for any potential need of surgical involvement and will assist with management of the chest tube.  I discussed with Dr. Carrasco and with Dr. Dhaliwal from the Pulmonary team regarding further management.  The patient will be moved to the ICU and will complete trauma workup with scans once chest tube is placed.    See referring provider note below.    83-year-old woman with PMH HTN, TIA (on Eliquis), pacemaker, and osteoporosis adm to  on 07/12 after falling and hitting her right side on her wheelchair.  Patient said that she tripped.  There was no LOC and she did not hit her head.  ROS pos for right-sided chest wall pain.       On admission /93, HR 95.  Exam was pos for bruising on the right chest and arms and impaired recent memory.  Labs:  WBC 13.1, Hb 11.7, Na 131, K 4.9, Cr 1.2.      CT chest WO contrast pos for acute fractures of right ribs 8 -9 with small pneumothorax and extensive subcutaneous emphysema.  Small right hemothorax, right lower lung contusion/hematoma.  Also,, extensive compression deformities throughout the thoracic spine of uncertain chronicity.      EKG NSR notable for small T-wave inversion V2-4 which were not present on an earlier EKG (03/2020).      Patient became hypotensive this morning on her way to radiology for a repeat CT chest.  BP improved when patient placed in Trendelenburg and given IVF.      Repeat ECG  (829 h) pos for deep T-wave inversion in V2-6 which was not present on the earlier ECG. Troponin 0.037 > 0.013.      CT chest today pos for interval increase in right pleural effusion/ hemothorax with no significant change in small pneumothorax.  Also, increasing atelectasis of the right lower lobe obscuring the previously described hematoma/ contusion.   "Also, mild atelectasis on the left.       Patient complained of left hip pain this morning.  X-ray pos for left superior pubic ramus fracture of uncertain chronicity possibly acute.  also,, old intertrochanteric left hip fracture s/p ORIF.  Severe bony demineralization and degenerative changes noted.     VS (12 18 h) /62, , RR 15, SpO2 100% (2 L).  Labs WBC 13.1 > 10.2, Hb 11.7 > 9.7, Na 131> 132, Cr 1.2 > 0.8     Transfer requested for higher level of care.  Transfer diagnosis multiple right-sided rib fractures, right pulmonary contusion, right apical pneumothorax (small), rt > lt pleural effusion, left pubic ramus fracture of uncertain chronicity, EKG changes concerning for ischemia      Overview/Hospital Course:  Alvina Guallpa is an 83 year old female with a past medical history of Afib on Xarelto, cardiac pacemaker, HTN, anemia, HLD, and osteopenia with chronic thoracic, lumbar, left femur and pelvic fractures who presented with a mechanical fall leading to right sided 8th and 9th rib fractures with a hemopneumothorax. She had a chest tube placed in the ED upon transfer from [facility]. Her Hgb fell to 7 as there was significant output from the chest tube once placed. She received one unit of FFP and one unit of PRBCs. Home Xarelto is currently held. Hgb is being trended and has improved; output from the chest tube is also decreasing. Pulmonary is following. She is stable on  room air currently. Troponin is also being trended given mild elevation with TWIs at the lateral leads.      Interval History: see "Hospital Course"    Review of Systems   Respiratory:  Negative for shortness of breath.    Cardiovascular:  Negative for chest pain.   Skin:  Positive for pallor.   Objective:     Vital Signs (Most Recent):  Temp: 98.2 °F (36.8 °C) (07/15/23 0800)  Pulse: 91 (07/15/23 0850)  Resp: (!) 23 (07/15/23 0850)  BP: (!) 105/52 (07/15/23 0800)  SpO2: 100 % (07/15/23 0850) Vital Signs (24h Range):  Temp:  [98 °F " (36.7 °C)-98.3 °F (36.8 °C)] 98.2 °F (36.8 °C)  Pulse:  [] 91  Resp:  [13-28] 23  SpO2:  [99 %-100 %] 100 %  BP: ()/(52-76) 105/52     Weight: 47.6 kg (104 lb 15 oz)  Body mass index is 18.01 kg/m².    Intake/Output Summary (Last 24 hours) at 7/15/2023 0919  Last data filed at 7/15/2023 0800  Gross per 24 hour   Intake 826.67 ml   Output 1480 ml   Net -653.33 ml         Physical Exam  Vitals and nursing note reviewed.   Constitutional:       Appearance: She is ill-appearing.   HENT:      Head: Normocephalic and atraumatic.      Right Ear: External ear normal.      Left Ear: External ear normal.      Nose: Nose normal.      Mouth/Throat:      Mouth: Mucous membranes are moist.      Pharynx: Oropharynx is clear.   Eyes:      Extraocular Movements: Extraocular movements intact.      Conjunctiva/sclera: Conjunctivae normal.   Cardiovascular:      Rate and Rhythm: Normal rate and regular rhythm.      Pulses: Normal pulses.      Heart sounds: Normal heart sounds.   Pulmonary:      Effort: Pulmonary effort is normal. No respiratory distress.      Breath sounds: No rales.      Comments: Decreased breath sounds at the bases. RA. Chest tube right base with serosanguinous fluid.  Abdominal:      General: Bowel sounds are normal.      Palpations: Abdomen is soft.   Musculoskeletal:         General: Normal range of motion.      Cervical back: Normal range of motion and neck supple.      Right lower leg: No edema.      Left lower leg: No edema.   Skin:     Coloration: Skin is pale.   Neurological:      Mental Status: She is alert. Mental status is at baseline.   Psychiatric:         Mood and Affect: Mood normal.         Behavior: Behavior normal.           Significant Labs: All pertinent labs within the past 24 hours have been reviewed.    Significant Imaging: I have reviewed all pertinent imaging results/findings within the past 24 hours.      Assessment/Plan:      * Traumatic hemopneumothorax, initial  encounter  -Chest tube placed by Dr. Baker in the ED  -Hold home anticoagulation  -Trend CBC  -Pulmonary consulted  -Serial CXRs      Fall  -Fall precautions  -PT/OT when able      Acute posthemorrhagic anemia  Improving.  -S/p FFP and one unit PRBCs  -Hold anticoagulation  -Trend CBC      Troponin level elevated  Mildly elevated. Stable. EKG shows TWIs at lateral leads. May be secondary to acute trauma.   -Continue telemetry  -Trend troponin  -Consider Cardiology consultation    Right lower lobe lung mass  -Continue to monitor  -Pulmonary consulted  -Dedicated imaging once hemopneumothorax resolves      Lumbar compression fracture  Chronic.  -PT/OT  -PRN analgesics      HLD (hyperlipidemia)  -Continue statin      Atrial fibrillation  -Telemetry  -Hold anticoagulation  -Continue diltiazem    Closed fracture of ramus of left pubis  Chronic.  -PT/OT when able      Compression fracture of body of thoracic vertebra  Chronic.  -PRN analgesics  -PT/OT when able      Contusion of lung, closed, initial encounter  Secondary to fall.  -PRN analgesics      Hyponatremia  Mild. In setting of trauma.  -Continue to trend Na      Primary hypertension  -Hold home BP medications with exception of home diltiazem  -Continue to monitor      Cardiac pacemaker in situ  History of SSS. Stable.      Multiple closed fractures of ribs of right side  8th and 9th rib fracture. Stable.  -PRN analgesics        VTE Risk Mitigation (From admission, onward)         Ordered     IP VTE HIGH RISK PATIENT  Once         07/13/23 1650     Place sequential compression device  Until discontinued         07/13/23 1650                Discharge Planning   QUIQUE: 7/18/2023     Code Status: Full Code   Is the patient medically ready for discharge?:     Reason for patient still in hospital (select all that apply): Patient trending condition, Laboratory test, Treatment, Imaging, Consult recommendations, PT / OT recommendations and Pending disposition  Discharge  Plan A: Home with family, Home Health                  Ramón Mario MD  Department of Hospital Medicine   Critical access hospital

## 2023-07-15 NOTE — PT/OT/SLP EVAL
Physical Therapy Evaluation    Patient Name:  Alvina Guallpa   MRN:  [id number]    Recommendations:     Discharge Recommendations: home health PT, nursing facility, skilled (depending on pt's progress and available caregiver support)   Discharge Equipment Recommendations: none   Barriers to discharge: Impaired functional mobility, increased caregiver burden of care, fall risk    Assessment:     Alvina Guallpa is a 83 y.o. female admitted with a medical diagnosis of Traumatic hemopneumothorax, initial encounter.  She presents with the following impairments/functional limitations: weakness, impaired endurance, impaired cognition, impaired cardiopulmonary response to activity .    Pt presented seated in chair in NAD. She was alert and oriented x4. Pt t/f'ed to stand with  CGA and ambulated 50 ft RW and CGA with mild dyspnea requiring a brief standing rest.   HHPT/SNF is recommended depending on pt's progress and caregiver support.     Rehab Prognosis: Good; patient would benefit from acute skilled PT services to address these deficits and reach maximum level of function.    Recent Surgery: * No surgery found *      Plan:     During this hospitalization, patient to be seen daily to address the identified rehab impairments via gait training, therapeutic activities, therapeutic exercises and progress toward the following goals:    Plan of Care Expires:  08/15/23    Subjective     Chief Complaint: L chest pain at 4/10  Patient/Family Comments/goals: PLOF  Pain/Comfort:       Patients cultural, spiritual, Worship conflicts given the current situation:      Living Environment:  Pt lives with her grandson and his family.  Prior to admission, patients level of function was ambulatory with RW with  Mod I.  Equipment used at home: walker, standard, walker, rolling, bedside commode.  DME owned (not currently used): none.  Upon discharge, patient will have assistance from family.    Objective:     Communicated with  nurse prior to session.  Patient found up in chair with telemetry, chest tube  upon PT entry to room.    General Precautions: Standard,    Orthopedic Precautions:    Braces:    Respiratory Status: Room air    Exams:  Cognitive Exam:  Patient is oriented to Person, Place, Time, and Situation  RLE ROM: WFL  RLE Strength: WFL  LLE ROM: WFL  LLE Strength: WFL    Functional Mobility:  Transfers:     Sit to Stand:  minimum assistance with rolling walker  Gait: 50 ft x2 RW and CGA 1 brief standing rest due to mild dyaspnea      AM-PAC 6 CLICK MOBILITY  Total Score:        Treatment & Education:  Pt was educated on the role of PT, safety, use of call light. Pt ambulated in the rob with RW and CGA    Patient left up in chair with all lines intact, call button in reach, and nurse notified.    GOALS:   Multidisciplinary Problems       Physical Therapy Goals          Problem: Physical Therapy    Goal Priority Disciplines Outcome Goal Variances Interventions   Physical Therapy Goal     PT, PT/OT      Description: Goals to be met by: 8/15/2023     Patient will increase functional independence with mobility by performin. Supine to sit with Stand-by Assistance  2. Sit to stand transfer with Stand-by Assistance  3. Gait  x 150 feet with Stand-by Assistance using Rolling Walker.                          History:     Past Medical History:   Diagnosis Date    Anxiety     Chronic neck pain     Depression     Hypertension     Osteoporosis     Seasonal allergies     TIA (transient ischemic attack)        Past Surgical History:   Procedure Laterality Date    HYSTERECTOMY      INSERTION OF PACEMAKER      NASAL POLYP SURGERY Bilateral     NECK SURGERY         Time Tracking:     PT Received On: 07/15/23  PT Start Time: 1034     PT Stop Time: 1050  PT Total Time (min): 16 min     Billable Minutes: Evaluation 8 minutes and Gait Training 8 minutes      07/15/2023

## 2023-07-15 NOTE — CONSULTS
[facility]  Department of Cardiology  Consult Note      PATIENT NAME: Alvina Guallpa  MRN: [id number]  TODAY'S DATE: 07/15/2023  ADMIT DATE: 7/13/2023                          CONSULT REQUESTED BY: Ramón Mario MD    SUBJECTIVE     PRINCIPAL PROBLEM: Traumatic hemopneumothorax, initial encounter      REASON FOR CONSULT:    History of R rib fractures with hemopneumothorax; EKG changes with TWI in lateral leads, mildly elevated troponin      HPI:    Patient is an 83-year-old female with past medical history anxiety depression, atrial fibrillation on anticoagulation, hypertension, TIA, permanent pacemaker who presented to the ED after tripping and falling at home.   She was evaluated at the ER at [place] and was found to have rib fractures 8 and 9.  CT of the chest revealed hemopneumothorax with small apical pneumothorax.  Repeat CT scan showed enlarging hemopneumothorax and patient was transferred to [facility].  Patient was also noted to have mildly elevated troponin with T-wave inversions in the inferior lateral leads on EKG.  Chest tube was placed in ED. patient was significantly anemic after chest tube placement and received 1 unit of FFP and 1 unit of PRBC.    7/15/23  Patient remains in ICU.  T-wave inversion inferior lateral leads on initial EKGs this admission. Resolved on EKG this am.  H&H 9/26.4, K 4.1, creatinine 0 point, magnesium 2.1.; chest x-ray this morning shows right thoracostomy tube unchanged in position with persistent right lower lung airspace opacity and trace right apical pneumothorax.  Echocardiogram pending.  Chest tube leaking around insertion site.  Patient states she is feeling better.     Troponin HS this admission: 27.9, > 35.2, > 38.8, > 20.6      CT chest WO contrast pos for acute fractures of right ribs 8 -9 with small pneumothorax and extensive subcutaneous emphysema.  Small right hemothorax, right lower lung contusion/hematoma.  Also,, extensive  compression deformities throughout the thoracic spine of uncertain chronicity.     FROM H&P     HPI: Patient is an 83-year-old female with a history of AFib, hypertension, hyperlipidemia, scoliosis with multiple chronic spinal degenerative changes with chronic back pain, history of pelvic fracture and hip fracture in the past who presents after tripping and falling at home.  She was recently replaced on Eliquis by her cardiologist due to episodes of AFib.  She tripped and fell at home onto her right side and developed severe pain in her side.  She went to the ER at [place] and was evaluated and found to have rib fractures at level 8 and 9 in addition to what appeared to be multiple chronic spinal compression fractures on CT imaging.  CT of the chest reveals hemopneumothorax with small apical pneumothorax.  She was admitted there and monitored with repeat CT scan today showing enlarging hemopneumothorax.     Additionally, she was noted to have EKG changes with mildly elevated troponin though her troponin level trended down.  She is not having any chest pain.  She had an episode of hypotension that improved with IV fluids.     She was transferred per  transfer to [facility] for further evaluation.  EN route, per Pulmonary request the patient was redirected to the ER for further evaluation and chest tube placement.     On arrival in the ED:  The patient's vital signs are stable.  She is mildly tachycardic with heart rate in the low 100s.  She is comfortable and easily communicating.  She says she has back pain that is similar to her chronic back pain.  She has mild right rib pain.  Her pelvis is without pain at this time.  She has no other complaints.  She is breathing easily.  I discussed with the ED provider and Dr. Baker has graciously assisted with placement of chest tube.  Additionally, Dr Baker discussed with Dr Weiss who will consult for any potential need of surgical involvement and will  assist with management of the chest tube.  I discussed with Dr. Carrasco and with Dr. Dhaliwal from the Pulmonary team regarding further management.  The patient will be moved to the ICU and will complete trauma workup with scans once chest tube is placed.     See referring provider note below.     83-year-old woman with PMH HTN, TIA (on Eliquis), pacemaker, and osteoporosis adm to  on 07/12 after falling and hitting her right side on her wheelchair.  Patient said that she tripped.  There was no LOC and she did not hit her head.  ROS pos for right-sided chest wall pain.       On admission /93, HR 95.  Exam was pos for bruising on the right chest and arms and impaired recent memory.  Labs:  WBC 13.1, Hb 11.7, Na 131, K 4.9, Cr 1.2.      CT chest WO contrast pos for acute fractures of right ribs 8 -9 with small pneumothorax and extensive subcutaneous emphysema.  Small right hemothorax, right lower lung contusion/hematoma.  Also,, extensive compression deformities throughout the thoracic spine of uncertain chronicity.      EKG NSR notable for small T-wave inversion V2-4 which were not present on an earlier EKG (03/2020).      Patient became hypotensive this morning on her way to radiology for a repeat CT chest.  BP improved when patient placed in Trendelenburg and given IVF.      Repeat ECG  (829 h) pos for deep T-wave inversion in V2-6 which was not present on the earlier ECG. Troponin 0.037 > 0.013.      CT chest today pos for interval increase in right pleural effusion/ hemothorax with no significant change in small pneumothorax.  Also, increasing atelectasis of the right lower lobe obscuring the previously described hematoma/ contusion.  Also, mild atelectasis on the left.       Patient complained of left hip pain this morning.  X-ray pos for left superior pubic ramus fracture of uncertain chronicity possibly acute.  also,, old intertrochanteric left hip fracture s/p ORIF.  Severe bony demineralization and  degenerative changes noted.     VS (12 18 h) /62, , RR 15, SpO2 100% (2 L).  Labs WBC 13.1 > 10.2, Hb 11.7 > 9.7, Na 131> 132, Cr 1.2 > 0.8     Transfer requested for higher level of care.  Transfer diagnosis multiple right-sided rib fractures, right pulmonary contusion, right apical pneumothorax (small), rt > lt pleural effusion, left pubic ramus fracture of uncertain chronicity, EKG changes concerning for ischemia         Review of patient's allergies indicates:   Allergen Reactions    Penicillins Hives       Past Medical History:   Diagnosis Date    Anxiety     Chronic neck pain     Depression     Hypertension     Osteoporosis     Seasonal allergies     TIA (transient ischemic attack)      Past Surgical History:   Procedure Laterality Date    HYSTERECTOMY      INSERTION OF PACEMAKER      NASAL POLYP SURGERY Bilateral     NECK SURGERY       Social History     Tobacco Use    Smoking status: Never    Smokeless tobacco: Never   Substance Use Topics    Alcohol use: No     Alcohol/week: 0.0 standard drinks    Drug use: No        REVIEW OF SYSTEMS  CONSTITUTIONAL: Negative for chills, fatigue and fever.   EYES: No double vision, No blurred vision  NEURO: No headaches, No dizziness  RESPIRATORY: Negative for cough, shortness of breath and wheezing.    CARDIOVASCULAR: + Chest wall tenderness  GI: Negative for abdominal pain, No melena, diarrhea, nausea and vomiting.   : Negative for dysuria and frequency, Negative for hematuria  SKIN: Negative for bruising, Negative for edema or discoloration noted.   ENDOCRINE: Negative for polyphagia, Negative for heat intolerance, Negative for cold intolerance  PSYCHIATRIC: Negative for depression, Negative for anxiety, Negative for memory loss  MUSCULOSKELETAL: Negative for neck pain, Negative for muscle weakness, Negative for back pain     OBJECTIVE     VITAL SIGNS (Most Recent)  Temp: 97.6 °F (36.4 °C) (07/15/23 1200)  Pulse: 99 (07/15/23 1400)  Resp: 20 (07/15/23  1400)  BP: 128/73 (07/15/23 1400)  SpO2: 97 % (07/15/23 1400)    VENTILATION STATUS  Resp: 20 (07/15/23 1400)  SpO2: 97 % (07/15/23 1400)  Oxygen Concentration (%):  [28] 28        I & O (Last 24H):  Intake/Output Summary (Last 24 hours) at 7/15/2023 1453  Last data filed at 7/15/2023 0800  Gross per 24 hour   Intake 490 ml   Output 1390 ml   Net -900 ml       WEIGHTS  Wt Readings from Last 3 Encounters:   07/13/23 2001 47.6 kg (104 lb 15 oz)   07/12/23 1949 46.7 kg (103 lb)   06/22/23 1445 52.2 kg (114 lb 15.9 oz)       PHYSICAL EXAM  GENERAL: elderly female in no apparent distress alert and oriented.   HEENT: Normocephalic. Pupils normal and conjunctivae normal.    NECK: No JVD. No bruit..   THYROID: Thyroid not examined  CARDIAC: Regular rate and rhythm. S1 is normal.S2 is normal. Systolic murmur  CHEST ANATOMY: normal.   LUNGS: Crackles in bases; serosanguinous drainage in CT  ABDOMEN: Soft nontender, normal BS  URINARY: No tenorio catheter   EXTREMITIES: No cyanosis, clubbing or edema noted at this time.,   PERIPHERAL VASCULAR SYSTEM: Good palpable distal pulses.   CENTRAL NERVOUS SYSTEM: No focal motor or sensory deficits noted.   SKIN: Skin without lesions, moist, well perfused.   MUSCLE STRENGTH & TONE: Moves all extremities     HOME MEDICATIONS:  No current facility-administered medications on file prior to encounter.     Current Outpatient Medications on File Prior to Encounter   Medication Sig Dispense Refill    aspirin (ECOTRIN) 81 MG EC tablet Take 81 mg by mouth once daily.      atorvastatin (LIPITOR) 10 MG tablet Take 10 mg by mouth once daily.      baclofen (LIORESAL) 10 MG tablet Take 10 mg by mouth 2 (two) times daily. prn      budesonide (PULMICORT) 0.5 mg/2 mL nebulizer solution Take 2 mLs (0.5 mg total) by nebulization 2 (two) times daily. For use in saline irrigation as directed. 360 mL 0    celecoxib (CELEBREX) 100 MG capsule Take 100 mg by mouth 2 (two) times daily.      clobetasoL (TEMOVATE)  0.05 % cream Apply topically every evening. X 2 weeks, then as needed. 30 g 1    cloNIDine (CATAPRES) 0.1 MG tablet Take 0.1 mg by mouth once daily.      diltiaZEM HCl 120 mg Cp12 Take 1 capsule by mouth every 12 (twelve) hours.      estradioL (ESTRACE) 0.01 % (0.1 mg/gram) vaginal cream Apply 1 gram vaginally daily for two weeks and then twice weekly 42.5 g 2    FOLIC ACID/MULTIVIT-MIN/LUTEIN (CENTRUM SILVER ORAL) Take by mouth.      HYDROcodone-acetaminophen (NORCO) 5-325 mg per tablet Take 1 tablet by mouth every 6 (six) hours as needed for Pain.      HYDROcodone-acetaminophen (NORCO) 7.5-325 mg per tablet Take 1 tablet by mouth 2 (two) times daily.      lisinopriL 10 MG tablet Take 10 mg by mouth.      rivaroxaban (XARELTO) 10 mg Tab Take 10 mg by mouth daily with dinner or evening meal.      traZODone (DESYREL) 50 MG tablet Take 50 mg by mouth every evening.         SCHEDULED MEDS:   atorvastatin  10 mg Oral Daily    diltiaZEM  120 mg Oral Daily    mupirocin   Nasal BID    traZODone  50 mg Oral QHS       CONTINUOUS INFUSIONS:    PRN MEDS:acetaminophen, baclofen, HYDROcodone-acetaminophen, morphine, naloxone, ondansetron, prochlorperazine, sodium chloride 0.9%    LABS AND DIAGNOSTICS     CBC LAST 3 DAYS  Recent Labs   Lab 07/14/23  1141 07/14/23  1810 07/15/23  0021   WBC 9.29 10.97 7.98   RBC 2.85* 3.19* 2.84*   HGB 8.8* 10.0* 9.0*   HCT 26.5* 29.6* 26.4*   MCV 93 93 93   MCH 30.9 31.3* 31.7*   MCHC 33.2 33.8 34.1   RDW 13.6 13.6 13.6   * 177 143*   MPV 12.5 12.6 12.6   GRAN 80.8*  7.5 74.9*  8.2* 70.4  5.6   LYMPH 9.6*  0.9* 14.3*  1.6 17.0*  1.4   MONO 8.8  0.8 8.6  0.9 10.2  0.8   BASO 0.02 0.04 0.04   NRBC 0 0 0       COAGULATION LAST 3 DAYS  Recent Labs   Lab 07/12/23  2126 07/13/23  2047   INR 1.1 1.0   APTT <21.0  --        CHEMISTRY LAST 3 DAYS  Recent Labs   Lab 07/13/23  0458 07/13/23  0833 07/14/23  0408 07/15/23  0338   * 132* 134* 134*   K 5.1 4.3 4.3 4.1   CL 97 100 101 99    CO2 24 22* 28 30*   ANIONGAP 9 10 5* 5*   BUN 24* 23 22 19   CREATININE 0.9 0.8 0.6 0.5    107 110 103   CALCIUM 8.7 8.6* 8.5* 8.0*   MG 1.7  --  1.8 2.1   ALBUMIN  --  3.5 3.1* 2.8*   PROT  --  5.6* 5.1* 4.7*   ALKPHOS  --  58 45* 44*   ALT  --  13 15 15   AST  --  26 23 20   BILITOT  --  0.9 0.9 1.0       CARDIAC PROFILE LAST 3 DAYS  Recent Labs   Lab 07/13/23  0158 07/13/23  0833 07/13/23  1128 07/13/23  2047 07/14/23  1141 07/14/23  1810 07/15/23  0933   *  --   --   --   --   --   --    TROPONINI  --  0.037* 0.013  --   --   --   --    TROPONINIHS  --   --   --    < > 35.2* 38.8* 20.6*    < > = values in this interval not displayed.       ENDOCRINE LAST 3 DAYS  No results for input(s): TSH, PROCAL in the last 168 hours.    LAST ARTERIAL BLOOD GAS  ABG  No results for input(s): PH, PO2, PCO2, HCO3, BE in the last 168 hours.    LAST 7 DAYS MICROBIOLOGY   Microbiology Results (last 7 days)       ** No results found for the last 168 hours. **            MOST RECENT IMAGING  X-Ray Chest 1 View  HISTORY: chest tube    FINDINGS: Portable chest radiograph at 0537 hours compared to 07/14/2023 shows right-sided thoracostomy tube unchanged in position with distal tip overlying the medial right lung base. A dual lead left subclavian CCD has distal lead tips unchanged in position, with stable enlarged cardiac silhouette and normal pulmonary vascularity. There are scattered aortic vascular calcifications.    The lungs are hypoexpanded, with unchanged right lower lung airspace opacities and blunting of the right costophrenic angle. There is a persistent trace right apical pneumothorax, with no left-sided pneumothorax or evidence of interstitial pulmonary edema.    There are right chest wall lucencies reflecting air, with no new osseous abnormality.    IMPRESSION: Right thoracostomy tube unchanged in position, with persistent right lower lung airspace opacities, and trace right apical  pneumothorax.    Electronically signed by:  Gabriel Padilla MD  7/15/2023 9:26 AM CDT Workstation: [id number]      ECHOCARDIOGRAM RESULTS (last 5)  No results found for this or any previous visit.      CURRENT/PREVIOUS VISIT EKG  Results for orders placed or performed during the hospital encounter of 07/13/23   EKG 12-lead    Collection Time: 07/15/23 11:58 AM    Narrative    Test Reason : R77.8,    Vent. Rate : 096 BPM     Atrial Rate : 096 BPM     P-R Int : 152 ms          QRS Dur : 112 ms      QT Int : 368 ms       P-R-T Axes : 000 -42 117 degrees     QTc Int : 464 ms    Sinus rhythm with occasional atrial-paced complexes and Premature  supraventricular complexes  Left axis deviation  Inferior infarct (cited on or before 14-JUL-2023)  Anterolateral infarct (cited on or before 14-JUL-2023)  Abnormal ECG  When compared with ECG of 14-JUL-2023 03:37,  Electronic atrial pacemaker has replaced Sinus rhythm  Serial changes of evolving Anterior infarct Present  Serial changes of evolving Anterolateral infarct Present    Referred By: WILLIAM REGALADO           Confirmed By:            ASSESSMENT/PLAN:     Active Hospital Problems    Diagnosis    *Traumatic hemopneumothorax, initial encounter    Atrial fibrillation    HLD (hyperlipidemia)    Lumbar compression fracture    Right lower lobe lung mass    Closed fracture of ramus of left pubis    Fall    Troponin level elevated    Multiple closed fractures of ribs of right side    Cardiac pacemaker in situ    Primary hypertension    Acute posthemorrhagic anemia    Contusion of lung, closed, initial encounter    Compression fracture of body of thoracic vertebra    Hyponatremia       ASSESSMENT & PLAN:     S/p mechanical fall  Traumatic hemopneumothorax  PAF  S/p ppm  Acute anemia  Elevated troponin level- minimally elevated        RECOMMENDATIONS:    Patient presented to [place] ED s/p fall w/ acute R rib fractures 8 - 9 & traumatic hemopneumothorax.  Patient was transferred to  [facility] for [facility] management.  Chest tube was placed.  Acutely anemic this admission- received 1 unit of FFP and 1 unit of PRBC.  Mildly elevated troponin HS and downtrending.   T-wave inversions on inferior lateral leads on EKG since admission.  T-wave inversion resolved on EKG this a.m..  History AFib.  Continue to hold anticoagulation in view of acute anemia and hemopneumothorax.  Continue diltiazem 120 mg daily.  Hold for systolic BP less than 100.  Patient may benefit from stress testing outpatient.   Thank you for the consultation.  We will continue to follow PRN.    Conchis Johnston NP  Department of Cardiology  Date of Service: 07/15/2023        I have personally interviewed and examined the patient, I have reviewed the Nurse Practitioner's history and physical, assessment, and plan. I agree with the findings and plan.    1. Patient had a fall with acute rib fractures and traumatic hemopneumothorax and on July 13th patient had an EKG which showed sinus rhythm with premature ventricular complexes or fusion beats with septal infarct ST T-wave abnormality in the inferior and anterior leads suggestive of ischemia.  And EKG from 07/13/2023 patient had intermittent wide complex beats suggestive of possible bundle branch block and EKG from 07/15/2023 shows rate related bundle branch block.    2. She has nondiagnostic troponin.  She is also acutely anemic with a hemoglobin  Of 7.0 requiring transfusion.  3. At the present time will treat her conservatively, she would need further cardiac workup including stress myocardial perfusion study and possibly coronary angiography.        Félix Castro M.D.  Department of Cardiology  Date of Service: 07/15/2023  2:53 PM

## 2023-07-15 NOTE — SUBJECTIVE & OBJECTIVE
"Interval History: see "Hospital Course"    Review of Systems   Respiratory:  Negative for shortness of breath.    Cardiovascular:  Negative for chest pain.   Skin:  Positive for pallor.   Objective:     Vital Signs (Most Recent):  Temp: 98.2 °F (36.8 °C) (07/15/23 0800)  Pulse: 91 (07/15/23 0850)  Resp: (!) 23 (07/15/23 0850)  BP: (!) 105/52 (07/15/23 0800)  SpO2: 100 % (07/15/23 0850) Vital Signs (24h Range):  Temp:  [98 °F (36.7 °C)-98.3 °F (36.8 °C)] 98.2 °F (36.8 °C)  Pulse:  [] 91  Resp:  [13-28] 23  SpO2:  [99 %-100 %] 100 %  BP: ()/(52-76) 105/52     Weight: 47.6 kg (104 lb 15 oz)  Body mass index is 18.01 kg/m².    Intake/Output Summary (Last 24 hours) at 7/15/2023 0919  Last data filed at 7/15/2023 0800  Gross per 24 hour   Intake 826.67 ml   Output 1480 ml   Net -653.33 ml         Physical Exam  Vitals and nursing note reviewed.   Constitutional:       Appearance: She is ill-appearing.   HENT:      Head: Normocephalic and atraumatic.      Right Ear: External ear normal.      Left Ear: External ear normal.      Nose: Nose normal.      Mouth/Throat:      Mouth: Mucous membranes are moist.      Pharynx: Oropharynx is clear.   Eyes:      Extraocular Movements: Extraocular movements intact.      Conjunctiva/sclera: Conjunctivae normal.   Cardiovascular:      Rate and Rhythm: Normal rate and regular rhythm.      Pulses: Normal pulses.      Heart sounds: Normal heart sounds.   Pulmonary:      Effort: Pulmonary effort is normal. No respiratory distress.      Breath sounds: No rales.      Comments: Decreased breath sounds at the bases. RA. Chest tube right base with serosanguinous fluid.  Abdominal:      General: Bowel sounds are normal.      Palpations: Abdomen is soft.   Musculoskeletal:         General: Normal range of motion.      Cervical back: Normal range of motion and neck supple.      Right lower leg: No edema.      Left lower leg: No edema.   Skin:     Coloration: Skin is pale.   Neurological: "      Mental Status: She is alert. Mental status is at baseline.   Psychiatric:         Mood and Affect: Mood normal.         Behavior: Behavior normal.           Significant Labs: All pertinent labs within the past 24 hours have been reviewed.    Significant Imaging: I have reviewed all pertinent imaging results/findings within the past 24 hours.

## 2023-07-16 LAB
ALBUMIN SERPL BCP-MCNC: 3.3 G/DL (ref 3.5–5.2)
ALP SERPL-CCNC: 52 U/L (ref 55–135)
ALT SERPL W/O P-5'-P-CCNC: 15 U/L (ref 10–44)
ANION GAP SERPL CALC-SCNC: 3 MMOL/L (ref 8–16)
AST SERPL-CCNC: 21 U/L (ref 10–40)
BASOPHILS # BLD AUTO: 0.04 K/UL (ref 0–0.2)
BASOPHILS NFR BLD: 0.4 % (ref 0–1.9)
BILIRUB SERPL-MCNC: 1.1 MG/DL (ref 0.1–1)
BUN SERPL-MCNC: 21 MG/DL (ref 8–23)
CALCIUM SERPL-MCNC: 8.8 MG/DL (ref 8.7–10.5)
CHLORIDE SERPL-SCNC: 100 MMOL/L (ref 95–110)
CO2 SERPL-SCNC: 31 MMOL/L (ref 23–29)
CREAT SERPL-MCNC: 0.6 MG/DL (ref 0.5–1.4)
DIFFERENTIAL METHOD: ABNORMAL
EOSINOPHIL # BLD AUTO: 0.3 K/UL (ref 0–0.5)
EOSINOPHIL NFR BLD: 2.4 % (ref 0–8)
ERYTHROCYTE [DISTWIDTH] IN BLOOD BY AUTOMATED COUNT: 13.1 % (ref 11.5–14.5)
EST. GFR  (NO RACE VARIABLE): >60 ML/MIN/1.73 M^2
GLUCOSE SERPL-MCNC: 123 MG/DL (ref 70–110)
HCT VFR BLD AUTO: 28.2 % (ref 37–48.5)
HGB BLD-MCNC: 9.1 G/DL (ref 12–16)
IMM GRANULOCYTES # BLD AUTO: 0.06 K/UL (ref 0–0.04)
IMM GRANULOCYTES NFR BLD AUTO: 0.6 % (ref 0–0.5)
LYMPHOCYTES # BLD AUTO: 0.7 K/UL (ref 1–4.8)
LYMPHOCYTES NFR BLD: 6.8 % (ref 18–48)
MAGNESIUM SERPL-MCNC: 1.9 MG/DL (ref 1.6–2.6)
MCH RBC QN AUTO: 30.2 PG (ref 27–31)
MCHC RBC AUTO-ENTMCNC: 32.3 G/DL (ref 32–36)
MCV RBC AUTO: 94 FL (ref 82–98)
MONOCYTES # BLD AUTO: 0.8 K/UL (ref 0.3–1)
MONOCYTES NFR BLD: 7.9 % (ref 4–15)
NEUTROPHILS # BLD AUTO: 8.8 K/UL (ref 1.8–7.7)
NEUTROPHILS NFR BLD: 81.9 % (ref 38–73)
NRBC BLD-RTO: 0 /100 WBC
PLATELET # BLD AUTO: 183 K/UL (ref 150–450)
PMV BLD AUTO: 12.5 FL (ref 9.2–12.9)
POTASSIUM SERPL-SCNC: 4 MMOL/L (ref 3.5–5.1)
PROT SERPL-MCNC: 5.4 G/DL (ref 6–8.4)
RBC # BLD AUTO: 3.01 M/UL (ref 4–5.4)
SODIUM SERPL-SCNC: 134 MMOL/L (ref 136–145)
WBC # BLD AUTO: 10.7 K/UL (ref 3.9–12.7)

## 2023-07-16 PROCEDURE — 63600175 PHARM REV CODE 636 W HCPCS: Performed by: STUDENT IN AN ORGANIZED HEALTH CARE EDUCATION/TRAINING PROGRAM

## 2023-07-16 PROCEDURE — 80053 COMPREHEN METABOLIC PANEL: CPT | Performed by: STUDENT IN AN ORGANIZED HEALTH CARE EDUCATION/TRAINING PROGRAM

## 2023-07-16 PROCEDURE — 97530 THERAPEUTIC ACTIVITIES: CPT

## 2023-07-16 PROCEDURE — 94799 UNLISTED PULMONARY SVC/PX: CPT

## 2023-07-16 PROCEDURE — 36415 COLL VENOUS BLD VENIPUNCTURE: CPT | Performed by: STUDENT IN AN ORGANIZED HEALTH CARE EDUCATION/TRAINING PROGRAM

## 2023-07-16 PROCEDURE — 21000000 HC CCU ICU ROOM CHARGE

## 2023-07-16 PROCEDURE — 99233 PR SUBSEQUENT HOSPITAL CARE,LEVL III: ICD-10-PCS | Mod: ,,, | Performed by: INTERNAL MEDICINE

## 2023-07-16 PROCEDURE — 97116 GAIT TRAINING THERAPY: CPT

## 2023-07-16 PROCEDURE — 99900035 HC TECH TIME PER 15 MIN (STAT)

## 2023-07-16 PROCEDURE — 20000000 HC ICU ROOM

## 2023-07-16 PROCEDURE — 85025 COMPLETE CBC W/AUTO DIFF WBC: CPT | Performed by: STUDENT IN AN ORGANIZED HEALTH CARE EDUCATION/TRAINING PROGRAM

## 2023-07-16 PROCEDURE — 99233 SBSQ HOSP IP/OBS HIGH 50: CPT | Mod: ,,, | Performed by: INTERNAL MEDICINE

## 2023-07-16 PROCEDURE — 99232 SBSQ HOSP IP/OBS MODERATE 35: CPT | Mod: ,,, | Performed by: INTERNAL MEDICINE

## 2023-07-16 PROCEDURE — 25000003 PHARM REV CODE 250: Performed by: STUDENT IN AN ORGANIZED HEALTH CARE EDUCATION/TRAINING PROGRAM

## 2023-07-16 PROCEDURE — 99232 PR SUBSEQUENT HOSPITAL CARE,LEVL II: ICD-10-PCS | Mod: ,,, | Performed by: INTERNAL MEDICINE

## 2023-07-16 PROCEDURE — 94761 N-INVAS EAR/PLS OXIMETRY MLT: CPT

## 2023-07-16 PROCEDURE — 25000003 PHARM REV CODE 250: Performed by: INTERNAL MEDICINE

## 2023-07-16 PROCEDURE — 83735 ASSAY OF MAGNESIUM: CPT | Performed by: STUDENT IN AN ORGANIZED HEALTH CARE EDUCATION/TRAINING PROGRAM

## 2023-07-16 PROCEDURE — 25000003 PHARM REV CODE 250

## 2023-07-16 PROCEDURE — 99900031 HC PATIENT EDUCATION (STAT)

## 2023-07-16 RX ORDER — METOPROLOL TARTRATE 25 MG/1
12.5 TABLET ORAL 2 TIMES DAILY
Status: DISCONTINUED | OUTPATIENT
Start: 2023-07-16 | End: 2023-07-25 | Stop reason: HOSPADM

## 2023-07-16 RX ADMIN — METOPROLOL TARTRATE 12.5 MG: 25 TABLET, FILM COATED ORAL at 08:07

## 2023-07-16 RX ADMIN — MUPIROCIN 1 G: 20 OINTMENT TOPICAL at 07:07

## 2023-07-16 RX ADMIN — MUPIROCIN 1 G: 20 OINTMENT TOPICAL at 08:07

## 2023-07-16 RX ADMIN — HYDROCODONE BITARTRATE AND ACETAMINOPHEN 1 TABLET: 5; 325 TABLET ORAL at 07:07

## 2023-07-16 RX ADMIN — ATORVASTATIN CALCIUM 10 MG: 10 TABLET, FILM COATED ORAL at 07:07

## 2023-07-16 RX ADMIN — DILTIAZEM HYDROCHLORIDE 120 MG: 120 CAPSULE, COATED, EXTENDED RELEASE ORAL at 07:07

## 2023-07-16 RX ADMIN — HYDROCODONE BITARTRATE AND ACETAMINOPHEN 1 TABLET: 5; 325 TABLET ORAL at 04:07

## 2023-07-16 RX ADMIN — HYDROCODONE BITARTRATE AND ACETAMINOPHEN 1 TABLET: 5; 325 TABLET ORAL at 12:07

## 2023-07-16 RX ADMIN — TRAZODONE HYDROCHLORIDE 50 MG: 50 TABLET ORAL at 08:07

## 2023-07-16 RX ADMIN — MORPHINE SULFATE 2 MG: 2 INJECTION, SOLUTION INTRAMUSCULAR; INTRAVENOUS at 06:07

## 2023-07-16 NOTE — ASSESSMENT & PLAN NOTE
Mildly elevated. Stable. EKG shows TWIs at lateral leads. May be secondary to acute trauma.   -Continue telemetry  -Cardiology consultation

## 2023-07-16 NOTE — PT/OT/SLP PROGRESS
"Physical Therapy Treatment    Patient Name:  Alvina Guallpa   MRN:  [id number]    Recommendations:     Discharge Recommendations: nursing facility, skilled, home health PT, other (see comments) (pending progress and level of assist available from family)  Discharge Equipment Recommendations: to be determined by next level of care  Barriers to discharge:  increased asssit needed for safe mobility    Assessment:     Alvina Guallpa is a 83 y.o. female admitted with a medical diagnosis of Traumatic hemopneumothorax, initial encounter.  She presents with the following impairments/functional limitations: weakness, impaired endurance, gait instability, impaired functional mobility, impaired balance, impaired skin, impaired cardiopulmonary response to activity.    Rehab Prognosis: Fair; patient would benefit from acute skilled PT services to address these deficits and reach maximum level of function.    Recent Surgery: * No surgery found *      Plan:     During this hospitalization, patient to be seen daily to address the identified rehab impairments via gait training, therapeutic activities, therapeutic exercises and progress toward the following goals:    Plan of Care Expires:  08/15/23    Subjective     Chief Complaint: "I just feel a little groggy"  Patient/Family Comments/goals: get better  Pain/Comfort:  Pain Rating Post-Intervention 1: 0/10      Objective:     Communicated with RNAmy prior to session.  Patient found HOB elevated with telemetry, chest tube, peripheral IV, pulse ox (continuous), blood pressure cuff upon PT entry to room.     General Precautions: Standard, fall  Orthopedic Precautions:    Braces:    Respiratory Status: Room air     Functional Mobility:  Bed Mobility:     Supine to Sit: minimum assistance  Sit to Supine: minimum assistance  Transfers:     Sit to Stand:  contact guard assistance with rolling walker  Gait: 2x15' w/RW and CGA, small steps, maintains SaO2 90% or " greater      AM-PAC 6 CLICK MOBILITY  Turning over in bed (including adjusting bedclothes, sheets and blankets)?: 3  Sitting down on and standing up from a chair with arms (e.g., wheelchair, bedside commode, etc.): 3  Moving from lying on back to sitting on the side of the bed?: 3  Moving to and from a bed to a chair (including a wheelchair)?: 3  Need to walk in hospital room?: 3  Climbing 3-5 steps with a railing?: 2  Basic Mobility Total Score: 17       Treatment & Education:  Pt was educated on the following: call light use, importance of OOB activity and functional mobility to negate the negative effects of prolonged bed rest during this hospitalization, safe transfers/ambulation and discharge planning recommendations/options.     Patient left HOB elevated with all lines intact, call button in reach, bed alarm on, and RN notified..    GOALS:   Multidisciplinary Problems       Physical Therapy Goals          Problem: Physical Therapy    Goal Priority Disciplines Outcome Goal Variances Interventions   Physical Therapy Goal     PT, PT/OT      Description: Goals to be met by: 8/15/2023     Patient will increase functional independence with mobility by performin. Supine to sit with Stand-by Assistance  2. Sit to stand transfer with Stand-by Assistance  3. Gait  x 150 feet with Stand-by Assistance using Rolling Walker.                          Time Tracking:     PT Received On: 23  PT Start Time: 1021     PT Stop Time: 1045  PT Total Time (min): 24 min     Billable Minutes: Gait Training 12 and Therapeutic Activity 12    Treatment Type: Treatment  PT/PTA: PT     Number of PTA visits since last PT visit: 0     2023

## 2023-07-16 NOTE — PROGRESS NOTES
[facility]  Progress Note    Patient Name: Alvina Guallpa  MRN: [id number]  Patient Class: IP- Inpatient   Admission Date: 7/13/2023  Length of Stay: 3 days  Attending Physician: Ramón Mario MD  Primary Care Provider: EDY Hurd        Subjective:     Principal Problem:Traumatic hemopneumothorax, initial encounter        HPI:  Patient is an 83-year-old female with a history of AFib, hypertension, hyperlipidemia, scoliosis with multiple chronic spinal degenerative changes with chronic back pain, history of pelvic fracture and hip fracture in the past who presents after tripping and falling at home.  She was recently replaced on Eliquis by her cardiologist due to episodes of AFib.  She tripped and fell at home onto her right side and developed severe pain in her side.  She went to the ER at [facility] and was evaluated and found to have rib fractures at level 8 and 9 in addition to what appeared to be multiple chronic spinal compression fractures on CT imaging.  CT of the chest reveals hemopneumothorax with small apical pneumothorax.  She was admitted there and monitored with repeat CT scan today showing enlarging hemopneumothorax.    Additionally, she was noted to have EKG changes with mildly elevated troponin though her troponin level trended down.  She is not having any chest pain.  She had an episode of hypotension that improved with IV fluids.    She was transferred per  transfer to [facility] for further evaluation.  EN route, per Pulmonary request the patient was redirected to the ER for further evaluation and chest tube placement.    On arrival in the ED:  The patient's vital signs are stable.  She is mildly tachycardic with heart rate in the low 100s.  She is comfortable and easily communicating.  She says she has back pain that is similar to her chronic back pain.  She has mild right rib pain.  Her pelvis is without pain at this time.  She has no  other complaints.  She is breathing easily.  I discussed with the ED provider and Dr. Baker has graciously assisted with placement of chest tube.  Additionally, Dr Baker discussed with Dr Weiss who will consult for any potential need of surgical involvement and will assist with management of the chest tube.  I discussed with Dr. Carrasco and with Dr. Dhaliwal from the Pulmonary team regarding further management.  The patient will be moved to the ICU and will complete trauma workup with scans once chest tube is placed.    See referring provider note below.    83-year-old woman with PMH HTN, TIA (on Eliquis), pacemaker, and osteoporosis adm to  on 07/12 after falling and hitting her right side on her wheelchair.  Patient said that she tripped.  There was no LOC and she did not hit her head.  ROS pos for right-sided chest wall pain.       On admission /93, HR 95.  Exam was pos for bruising on the right chest and arms and impaired recent memory.  Labs:  WBC 13.1, Hb 11.7, Na 131, K 4.9, Cr 1.2.      CT chest WO contrast pos for acute fractures of right ribs 8 -9 with small pneumothorax and extensive subcutaneous emphysema.  Small right hemothorax, right lower lung contusion/hematoma.  Also,, extensive compression deformities throughout the thoracic spine of uncertain chronicity.      EKG NSR notable for small T-wave inversion V2-4 which were not present on an earlier EKG (03/2020).      Patient became hypotensive this morning on her way to radiology for a repeat CT chest.  BP improved when patient placed in Trendelenburg and given IVF.      Repeat ECG  (829 h) pos for deep T-wave inversion in V2-6 which was not present on the earlier ECG. Troponin 0.037 > 0.013.      CT chest today pos for interval increase in right pleural effusion/ hemothorax with no significant change in small pneumothorax.  Also, increasing atelectasis of the right lower lobe obscuring the previously described hematoma/ contusion.   "Also, mild atelectasis on the left.       Patient complained of left hip pain this morning.  X-ray pos for left superior pubic ramus fracture of uncertain chronicity possibly acute.  also,, old intertrochanteric left hip fracture s/p ORIF.  Severe bony demineralization and degenerative changes noted.     VS (12 18 h) /62, , RR 15, SpO2 100% (2 L).  Labs WBC 13.1 > 10.2, Hb 11.7 > 9.7, Na 131> 132, Cr 1.2 > 0.8     Transfer requested for higher level of care.  Transfer diagnosis multiple right-sided rib fractures, right pulmonary contusion, right apical pneumothorax (small), rt > lt pleural effusion, left pubic ramus fracture of uncertain chronicity, EKG changes concerning for ischemia      Overview/Hospital Course:  Alvina Guallpa is an 83 year old female with a past medical history of Afib on Xarelto, cardiac pacemaker, HTN, anemia, HLD, and osteopenia with chronic thoracic, lumbar, left femur and pelvic fractures who presented with a mechanical fall leading to right sided 8th and 9th rib fractures with a hemopneumothorax. She had a chest tube placed in the ED upon transfer from [facility]. Her Hgb fell to 7 as there was significant output from the chest tube once placed. She received one unit of FFP and one unit of PRBCs. Home Xarelto is currently held. Hgb is being trended and has improved; output from the chest tube is also decreasing. Pulmonary is following. She is stable on room air currently. Daily serial CXRs show progressive improvement. Troponin is also being trended given mild elevation with TWIs at the lateral leads. Cardiology has been consulted and is considering stress testing and/or angiogram. Transfer orders to leave the ICU were placed 7/16.      Interval History: see "Hospital Course"    Review of Systems   Respiratory:  Negative for shortness of breath.    Cardiovascular:  Negative for chest pain.   Skin:  Positive for pallor.   Objective:     Vital Signs (Most Recent):  Temp: 98 °F (36.7 " °C) (07/16/23 0701)  Pulse: 109 (07/16/23 1000)  Resp: 20 (07/16/23 1000)  BP: 138/62 (07/16/23 1000)  SpO2: 97 % (07/16/23 1000) Vital Signs (24h Range):  Temp:  [97.6 °F (36.4 °C)-98.1 °F (36.7 °C)] 98 °F (36.7 °C)  Pulse:  [] 109  Resp:  [12-31] 20  SpO2:  [92 %-100 %] 97 %  BP: (114-160)/(55-83) 138/62     Weight: 47.6 kg (104 lb 15 oz)  Body mass index is 18.01 kg/m².    Intake/Output Summary (Last 24 hours) at 7/16/2023 1124  Last data filed at 7/16/2023 0745  Gross per 24 hour   Intake 990 ml   Output 350 ml   Net 640 ml         Physical Exam  Vitals and nursing note reviewed.   Constitutional:       Appearance: She is ill-appearing.   HENT:      Head: Normocephalic and atraumatic.      Right Ear: External ear normal.      Left Ear: External ear normal.      Nose: Nose normal.      Mouth/Throat:      Mouth: Mucous membranes are moist.      Pharynx: Oropharynx is clear.   Eyes:      Extraocular Movements: Extraocular movements intact.      Conjunctiva/sclera: Conjunctivae normal.   Cardiovascular:      Rate and Rhythm: Normal rate and regular rhythm.      Pulses: Normal pulses.      Heart sounds: Normal heart sounds.   Pulmonary:      Effort: Pulmonary effort is normal. No respiratory distress.      Breath sounds: No rales.      Comments: Decreased breath sounds at the bases. RA. Chest tube right base with serosanguinous fluid.  Abdominal:      General: Bowel sounds are normal.      Palpations: Abdomen is soft.   Musculoskeletal:         General: Normal range of motion.      Cervical back: Normal range of motion and neck supple.      Right lower leg: No edema.      Left lower leg: No edema.   Skin:     Coloration: Skin is pale.   Neurological:      Mental Status: She is alert. Mental status is at baseline.   Psychiatric:         Mood and Affect: Mood normal.         Behavior: Behavior normal.           Significant Labs: All pertinent labs within the past 24 hours have been reviewed.    Significant  Imaging: I have reviewed all pertinent imaging results/findings within the past 24 hours.      Assessment/Plan:      * Traumatic hemopneumothorax, initial encounter  Improving.  -Chest tube placed by Dr. Baker in the ED  -Hold home anticoagulation  -Trend CBC  -Pulmonary consulted  -Serial CXRs      Fall  -Fall precautions  -PT/OT  -CM consulted for SNF placement    Acute posthemorrhagic anemia  Improving.  -S/p FFP and one unit PRBCs  -Hold anticoagulation  -Trend CBC      Troponin level elevated  Mildly elevated. Stable. EKG shows TWIs at lateral leads. May be secondary to acute trauma.   -Continue telemetry  -Cardiology consultation    Right lower lobe lung mass  -Continue to monitor  -Pulmonary consulted  -Dedicated imaging once hemopneumothorax resolves      Lumbar compression fracture  Chronic.  -PT/OT  -PRN analgesics      HLD (hyperlipidemia)  -Continue statin      Atrial fibrillation  -Telemetry  -Hold anticoagulation  -Continue diltiazem    Closed fracture of ramus of left pubis  Chronic.  -PT/OT when able      Compression fracture of body of thoracic vertebra  Chronic.  -PRN analgesics  -PT/OT when able      Contusion of lung, closed, initial encounter  Secondary to fall.  -PRN analgesics      Hyponatremia  Mild. In setting of trauma.  -Continue to trend Na      Primary hypertension  -Hold home BP medications with exception of home diltiazem  -Continue to monitor      Cardiac pacemaker in situ  History of SSS. Stable.      Multiple closed fractures of ribs of right side  8th and 9th rib fracture. Stable.  -PRN analgesics        VTE Risk Mitigation (From admission, onward)         Ordered     IP VTE HIGH RISK PATIENT  Once         07/13/23 1650     Place sequential compression device  Until discontinued         07/13/23 1650                Discharge Planning   QUIQUE: 7/18/2023     Code Status: Full Code   Is the patient medically ready for discharge?:     Reason for patient still in hospital (select all  that apply): Patient trending condition, Laboratory test, Treatment, Imaging, Consult recommendations, PT / OT recommendations and Pending disposition  Discharge Plan A: Home with family, Home Health                  Ramón Mario MD  Department of Hospital Medicine   [facility]

## 2023-07-16 NOTE — CARE UPDATE
07/16/23 0709   Patient Assessment/Suction   Level of Consciousness (AVPU) alert   Respiratory Effort Normal;Unlabored   Expansion/Accessory Muscles/Retractions no use of accessory muscles   PRE-TX-O2   Device (Oxygen Therapy) room air   SpO2 95 %   Pulse Oximetry Type Continuous   $ Pulse Oximetry - Multiple Charge Pulse Oximetry - Multiple   Pulse 93   Resp 16

## 2023-07-16 NOTE — PROGRESS NOTES
Pt became irritated and confused and felt like she was restrained in the bed with BP cuff, telemetry cord, and O2 wire. Wanted to get up out of bed and into chair. Pt ambulated to chair fine and now back in bed. Pt refused hourly BP checks. Will spot check for now.

## 2023-07-16 NOTE — PROGRESS NOTES
Progress Note  PULMONARY    Admit Date: 7/13/2023 07/16/2023  History of Present Illness:  Pt is an 82 yo female with right side hemopneumothorax after a fall at home. She is a poor historian but does remember she has heart disease. She is on eliquis- new med 2 wks for atrial fib- last dose wed am. She endorses pain on the right side of her back, better s/p pain meds.. She was admitted at [facility] yesterday and transferred to [facility] ED for higher level of care. She underwent right side chest tube placement in ED and is being transferred to ICU.    SUBJECTIVE:     7/14- has been stable overnight. Hgb downtrending to 7. pt receiving 1 unit pRBCs. She states pain R back is 4-5/10. No other complaints. No SOB or cough. Chest tube has so far drained 600mL bloody output    7/15 the chest tube continues to drain as much fluid around it as it does through it.  Do not see any residual pneumothorax on today's film.     7/16 the thoracostomy tube continues to drain.  The patient is doing well.  She sat up in the chair for hours.  She ate.  Her pain is controlled.    OBJECTIVE:     Vitals (Most recent):  Vitals:    07/16/23 1301   BP: (!) 151/71   Pulse: 99   Resp: 18   Temp: 97.4 °F (36.3 °C)       Vitals (24 hour range):  Temp:  [97.4 °F (36.3 °C)-98.1 °F (36.7 °C)]   Pulse:  []   Resp:  [12-31]   BP: (116-160)/(59-83)   SpO2:  [92 %-100 %]       Intake/Output Summary (Last 24 hours) at 7/16/2023 1629  Last data filed at 7/16/2023 1535  Gross per 24 hour   Intake 1120 ml   Output 1510 ml   Net -390 ml          PHYSICAL EXAM  GENERAL: Older patient in no distress.  HEENT: Pupils equal and reactive. Extraocular movements intact. Nose intact.   Pharynx moist.  NECK: Supple.   HEART: Regular rate and rhythm. No murmur or gallop auscultated.  LUNGS:  Crackles in both bases, right greater than left. Lung excursion symmetrical. No change in fremitus.  Thoracostomy tube:  Draining serosanguineous  ABDOMEN: Bowel sounds present.  Non-tender, no masses palpated.  : Normal anatomy.  EXTREMITIES: Normal muscle tone and joint movement, no cyanosis or clubbing.   LYMPHATICS: No adenopathy palpated, no edema.  SKIN: Dry, intact, no lesions.   NEURO: Cranial nerves II-XII intact. Motor strength 5/5 bilaterally, upper and lower extremities.  PSYCH: Appropriate affect.      Radiographs reviewed: view by direct vision   CXR 7/14- R chest tube in place tip projects toward mediastinum, improved aeration R lower lung. Tiny apical pneumothorax visible   7/15 chest x-ray is unchanged  7/16 the right lower lobe is opening up.  There is no pneumothorax visible.    Labs     Recent Labs   Lab 07/16/23  0334   WBC 10.70   HGB 9.1*   HCT 28.2*        Recent Labs   Lab 07/16/23  0333   *   K 4.0      CO2 31*   BUN 21   CREATININE 0.6   *   CALCIUM 8.8   MG 1.9   AST 21   ALT 15   ALKPHOS 52*   BILITOT 1.1*   PROT 5.4*   ALBUMIN 3.3*   No results for input(s): PH, PCO2, PO2, HCO3 in the last 24 hours.  Microbiology Results (last 7 days)       ** No results found for the last 168 hours. **            Impression:  Active Hospital Problems    Diagnosis  POA    *Traumatic hemopneumothorax, initial encounter [S27.2XXA]  Yes    Atrial fibrillation [I48.91]  Yes    HLD (hyperlipidemia) [E78.5]  Yes    Lumbar compression fracture [S32.000A]  Yes    Right lower lobe lung mass [R91.8]  Yes    Closed fracture of ramus of left pubis [S32.592A]  Yes    Fall [W19.XXXA]  Yes    Troponin level elevated [R77.8]  Yes    Multiple closed fractures of ribs of right side [S22.41XA]  Yes    Cardiac pacemaker in situ [Z95.0]  Yes    Primary hypertension [I10]  Yes    Acute posthemorrhagic anemia [D62]  Yes    Contusion of lung, closed, initial encounter [S27.329A]  Yes    Compression fracture of body of thoracic vertebra [S22.000A]  Yes    Hyponatremia [E87.1]  Yes      Resolved Hospital Problems   No resolved problems to display.         Traumatic  hemopneumothorax  - thoracostomy tube in place  - serosanguineous drainage  - at least as much fluid coming around the thoracostomy tube as through it  - no bronchopleural fistula  - 260 cc through the tube in the last 24 hours  Anemia  - hemoglobin stable  - SCDs instead of enoxaparin  Thrombocytopenia  - resolved  Right lower lobe contusion  Multiple rib fractures  - patient having pain controlled with hydrocodone  - no objection to moving out of ICU    Physical therapy working with patient.  She sat in a chair today.

## 2023-07-16 NOTE — CARE UPDATE
07/15/23 2018   Patient Assessment/Suction   Level of Consciousness (AVPU) alert   Respiratory Effort Unlabored;Normal   Expansion/Accessory Muscles/Retractions no use of accessory muscles;no retractions   All Lung Fields Breath Sounds clear   RLL Breath Sounds diminished   PRE-TX-O2   Device (Oxygen Therapy) room air   Incentive Spirometer   $ Incentive Spirometer Charges done with encouragement   Incentive Spirometer Predicted Level (mL) 750   Administration (IS) proper technique demonstrated;mouthpiece utilized   Number of Repetitions (IS) 8   Patient Tolerance (IS) good

## 2023-07-16 NOTE — SUBJECTIVE & OBJECTIVE
"Interval History: see "Hospital Course"    Review of Systems   Respiratory:  Negative for shortness of breath.    Cardiovascular:  Negative for chest pain.   Skin:  Positive for pallor.   Objective:     Vital Signs (Most Recent):  Temp: 98 °F (36.7 °C) (07/16/23 0701)  Pulse: 109 (07/16/23 1000)  Resp: 20 (07/16/23 1000)  BP: 138/62 (07/16/23 1000)  SpO2: 97 % (07/16/23 1000) Vital Signs (24h Range):  Temp:  [97.6 °F (36.4 °C)-98.1 °F (36.7 °C)] 98 °F (36.7 °C)  Pulse:  [] 109  Resp:  [12-31] 20  SpO2:  [92 %-100 %] 97 %  BP: (114-160)/(55-83) 138/62     Weight: 47.6 kg (104 lb 15 oz)  Body mass index is 18.01 kg/m².    Intake/Output Summary (Last 24 hours) at 7/16/2023 1124  Last data filed at 7/16/2023 0745  Gross per 24 hour   Intake 990 ml   Output 350 ml   Net 640 ml         Physical Exam  Vitals and nursing note reviewed.   Constitutional:       Appearance: She is ill-appearing.   HENT:      Head: Normocephalic and atraumatic.      Right Ear: External ear normal.      Left Ear: External ear normal.      Nose: Nose normal.      Mouth/Throat:      Mouth: Mucous membranes are moist.      Pharynx: Oropharynx is clear.   Eyes:      Extraocular Movements: Extraocular movements intact.      Conjunctiva/sclera: Conjunctivae normal.   Cardiovascular:      Rate and Rhythm: Normal rate and regular rhythm.      Pulses: Normal pulses.      Heart sounds: Normal heart sounds.   Pulmonary:      Effort: Pulmonary effort is normal. No respiratory distress.      Breath sounds: No rales.      Comments: Decreased breath sounds at the bases. RA. Chest tube right base with serosanguinous fluid.  Abdominal:      General: Bowel sounds are normal.      Palpations: Abdomen is soft.   Musculoskeletal:         General: Normal range of motion.      Cervical back: Normal range of motion and neck supple.      Right lower leg: No edema.      Left lower leg: No edema.   Skin:     Coloration: Skin is pale.   Neurological:      Mental " Status: She is alert. Mental status is at baseline.   Psychiatric:         Mood and Affect: Mood normal.         Behavior: Behavior normal.           Significant Labs: All pertinent labs within the past 24 hours have been reviewed.    Significant Imaging: I have reviewed all pertinent imaging results/findings within the past 24 hours.

## 2023-07-17 LAB
ALBUMIN SERPL BCP-MCNC: 3 G/DL (ref 3.5–5.2)
ALP SERPL-CCNC: 51 U/L (ref 55–135)
ALT SERPL W/O P-5'-P-CCNC: 15 U/L (ref 10–44)
ANION GAP SERPL CALC-SCNC: 6 MMOL/L (ref 8–16)
AST SERPL-CCNC: 19 U/L (ref 10–40)
BASOPHILS # BLD AUTO: 0.04 K/UL (ref 0–0.2)
BASOPHILS NFR BLD: 0.4 % (ref 0–1.9)
BILIRUB SERPL-MCNC: 0.9 MG/DL (ref 0.1–1)
BUN SERPL-MCNC: 15 MG/DL (ref 8–23)
CALCIUM SERPL-MCNC: 8.2 MG/DL (ref 8.7–10.5)
CHLORIDE SERPL-SCNC: 97 MMOL/L (ref 95–110)
CO2 SERPL-SCNC: 28 MMOL/L (ref 23–29)
CREAT SERPL-MCNC: 0.5 MG/DL (ref 0.5–1.4)
DIFFERENTIAL METHOD: ABNORMAL
EOSINOPHIL # BLD AUTO: 0.3 K/UL (ref 0–0.5)
EOSINOPHIL NFR BLD: 3.3 % (ref 0–8)
ERYTHROCYTE [DISTWIDTH] IN BLOOD BY AUTOMATED COUNT: 12.7 % (ref 11.5–14.5)
EST. GFR  (NO RACE VARIABLE): >60 ML/MIN/1.73 M^2
GLUCOSE SERPL-MCNC: 112 MG/DL (ref 70–110)
HCT VFR BLD AUTO: 26.7 % (ref 37–48.5)
HGB BLD-MCNC: 8.8 G/DL (ref 12–16)
IMM GRANULOCYTES # BLD AUTO: 0.05 K/UL (ref 0–0.04)
IMM GRANULOCYTES NFR BLD AUTO: 0.5 % (ref 0–0.5)
LYMPHOCYTES # BLD AUTO: 1 K/UL (ref 1–4.8)
LYMPHOCYTES NFR BLD: 9.3 % (ref 18–48)
MAGNESIUM SERPL-MCNC: 1.8 MG/DL (ref 1.6–2.6)
MCH RBC QN AUTO: 30.8 PG (ref 27–31)
MCHC RBC AUTO-ENTMCNC: 33 G/DL (ref 32–36)
MCV RBC AUTO: 93 FL (ref 82–98)
MONOCYTES # BLD AUTO: 1 K/UL (ref 0.3–1)
MONOCYTES NFR BLD: 9.7 % (ref 4–15)
NEUTROPHILS # BLD AUTO: 7.8 K/UL (ref 1.8–7.7)
NEUTROPHILS NFR BLD: 76.8 % (ref 38–73)
NRBC BLD-RTO: 0 /100 WBC
PLATELET # BLD AUTO: 203 K/UL (ref 150–450)
PMV BLD AUTO: 12.3 FL (ref 9.2–12.9)
POTASSIUM SERPL-SCNC: 3.8 MMOL/L (ref 3.5–5.1)
PROT SERPL-MCNC: 5.3 G/DL (ref 6–8.4)
RBC # BLD AUTO: 2.86 M/UL (ref 4–5.4)
SODIUM SERPL-SCNC: 131 MMOL/L (ref 136–145)
WBC # BLD AUTO: 10.19 K/UL (ref 3.9–12.7)

## 2023-07-17 PROCEDURE — 25000003 PHARM REV CODE 250

## 2023-07-17 PROCEDURE — 85025 COMPLETE CBC W/AUTO DIFF WBC: CPT | Performed by: STUDENT IN AN ORGANIZED HEALTH CARE EDUCATION/TRAINING PROGRAM

## 2023-07-17 PROCEDURE — 21400001 HC TELEMETRY ROOM

## 2023-07-17 PROCEDURE — 25000003 PHARM REV CODE 250: Performed by: STUDENT IN AN ORGANIZED HEALTH CARE EDUCATION/TRAINING PROGRAM

## 2023-07-17 PROCEDURE — 25000003 PHARM REV CODE 250: Performed by: INTERNAL MEDICINE

## 2023-07-17 PROCEDURE — 99232 PR SUBSEQUENT HOSPITAL CARE,LEVL II: ICD-10-PCS | Mod: ,,, | Performed by: INTERNAL MEDICINE

## 2023-07-17 PROCEDURE — 36415 COLL VENOUS BLD VENIPUNCTURE: CPT | Performed by: STUDENT IN AN ORGANIZED HEALTH CARE EDUCATION/TRAINING PROGRAM

## 2023-07-17 PROCEDURE — 99232 SBSQ HOSP IP/OBS MODERATE 35: CPT | Mod: ,,, | Performed by: INTERNAL MEDICINE

## 2023-07-17 PROCEDURE — 83735 ASSAY OF MAGNESIUM: CPT | Performed by: STUDENT IN AN ORGANIZED HEALTH CARE EDUCATION/TRAINING PROGRAM

## 2023-07-17 PROCEDURE — 80053 COMPREHEN METABOLIC PANEL: CPT | Performed by: STUDENT IN AN ORGANIZED HEALTH CARE EDUCATION/TRAINING PROGRAM

## 2023-07-17 RX ADMIN — HYDROCODONE BITARTRATE AND ACETAMINOPHEN 1 TABLET: 5; 325 TABLET ORAL at 06:07

## 2023-07-17 RX ADMIN — HYDROCODONE BITARTRATE AND ACETAMINOPHEN 1 TABLET: 5; 325 TABLET ORAL at 04:07

## 2023-07-17 RX ADMIN — ACETAMINOPHEN 650 MG: 325 TABLET ORAL at 08:07

## 2023-07-17 RX ADMIN — METOPROLOL TARTRATE 12.5 MG: 25 TABLET, FILM COATED ORAL at 08:07

## 2023-07-17 RX ADMIN — DILTIAZEM HYDROCHLORIDE 120 MG: 120 CAPSULE, COATED, EXTENDED RELEASE ORAL at 08:07

## 2023-07-17 RX ADMIN — HYDROCODONE BITARTRATE AND ACETAMINOPHEN 1 TABLET: 5; 325 TABLET ORAL at 10:07

## 2023-07-17 RX ADMIN — ATORVASTATIN CALCIUM 10 MG: 10 TABLET, FILM COATED ORAL at 08:07

## 2023-07-17 RX ADMIN — MUPIROCIN 1 G: 20 OINTMENT TOPICAL at 08:07

## 2023-07-17 NOTE — RESPIRATORY THERAPY
07/16/23 2040   Patient Assessment/Suction   Level of Consciousness (AVPU) alert   Respiratory Effort Unlabored   PRE-TX-O2   Device (Oxygen Therapy) room air   SpO2 96 %   Pulse Oximetry Type Continuous   $ Pulse Oximetry - Multiple Charge Pulse Oximetry - Multiple   Pulse 103   Education   $ Education 15 min   Respiratory Evaluation   $ Care Plan Tech Time 15 min   $ Eval/Re-eval Charges Re-evaluation

## 2023-07-17 NOTE — PROGRESS NOTES
Progress Note  PULMONARY    Admit Date: 7/13/2023 07/17/2023  History of Present Illness:  Pt is an 84 yo female with right side hemopneumothorax after a fall at home. She is a poor historian but does remember she has heart disease. She is on eliquis- new med 2 wks for atrial fib- last dose wed am. She endorses pain on the right side of her back, better s/p pain meds.. She was admitted at [facility] yesterday and transferred to [facility] ED for higher level of care. She underwent right side chest tube placement in ED and is being transferred to ICU.    SUBJECTIVE:     7/14- has been stable overnight. Hgb downtrending to 7. pt receiving 1 unit pRBCs. She states pain R back is 4-5/10. No other complaints. No SOB or cough. Chest tube has so far drained 600mL bloody output    7/15 the chest tube continues to drain as much fluid around it as it does through it.  Do not see any residual pneumothorax on today's film.     7/16 the thoracostomy tube continues to drain.  The patient is doing well.  She sat up in the chair for hours.  She ate.  Her pain is controlled.    7/17 The patient is now on the cardiology floor. Her CT put out 220 in the last 24 hours.  The patient is complaining of pain.  She did not get out of bed today because her chest tube and ribs were hurting so badly today.  Explained how important it is for her to get out of bed tomorrow.  Discussed with her nurse that she needs some pain medicine and accurate I's and O's and dressing changes to her site.    OBJECTIVE:     Vitals (Most recent):  Vitals:    07/17/23 1514   BP: (!) 148/72   Pulse: 95   Resp: 20   Temp: 97.9 °F (36.6 °C)       Vitals (24 hour range):  Temp:  [97.7 °F (36.5 °C)-98.8 °F (37.1 °C)]   Pulse:  []   Resp:  [16-22]   BP: (108-148)/(52-96)   SpO2:  [91 %-100 %]       Intake/Output Summary (Last 24 hours) at 7/17/2023 1732  Last data filed at 7/17/2023 1330  Gross per 24 hour   Intake 580 ml   Output 160 ml   Net 420 ml          PHYSICAL  EXAM  GENERAL: Older patient in no distress.  HEENT: Pupils equal and reactive. Extraocular movements intact. Nose intact.   Pharynx moist.  NECK: Supple.   HEART: Regular rate and rhythm. No murmur or gallop auscultated.  LUNGS:  Crackles posteriorly.  Lung excursion symmetrical. No change in fremitus.  Thoracostomy tube:  Draining serosanguineous  ABDOMEN: Bowel sounds present. Non-tender, no masses palpated.  : Normal anatomy.  EXTREMITIES: Normal muscle tone and joint movement, no cyanosis or clubbing.   LYMPHATICS: No adenopathy palpated, no edema.  SKIN: Dry, intact, no lesions.   NEURO: Cranial nerves II-XII intact. Motor strength 5/5 bilaterally, upper and lower extremities.  PSYCH: Appropriate affect.      Radiographs reviewed: view by direct vision   CXR 7/14- R chest tube in place tip projects toward mediastinum, improved aeration R lower lung. Tiny apical pneumothorax visible   7/15 chest x-ray is unchanged  7/16 the right lower lobe is opening up.  There is no pneumothorax visible.  7/17 there is a tiny apical pneumothorax and a small basilar pneumothorax showing on today's chest x-ray.    Labs     Recent Labs   Lab 07/17/23  0559   WBC 10.19   HGB 8.8*   HCT 26.7*        Recent Labs   Lab 07/17/23  0559   *   K 3.8   CL 97   CO2 28   BUN 15   CREATININE 0.5   *   CALCIUM 8.2*   MG 1.8   AST 19   ALT 15   ALKPHOS 51*   BILITOT 0.9   PROT 5.3*   ALBUMIN 3.0*   No results for input(s): PH, PCO2, PO2, HCO3 in the last 24 hours.  Microbiology Results (last 7 days)       ** No results found for the last 168 hours. **            Impression:  Active Hospital Problems    Diagnosis  POA    *Traumatic hemopneumothorax, initial encounter [S27.2XXA]  Yes    Atrial fibrillation [I48.91]  Yes    HLD (hyperlipidemia) [E78.5]  Yes    Lumbar compression fracture [S32.000A]  Yes    Right lower lobe lung mass [R91.8]  Yes    Closed fracture of ramus of left pubis [S32.592A]  Yes    Fall [W19.XXXA]   Yes    Troponin level elevated [R77.8]  Yes    Multiple closed fractures of ribs of right side [S22.41XA]  Yes    Cardiac pacemaker in situ [Z95.0]  Yes    Primary hypertension [I10]  Yes    Acute posthemorrhagic anemia [D62]  Yes    Contusion of lung, closed, initial encounter [S27.329A]  Yes    Compression fracture of body of thoracic vertebra [S22.000A]  Yes    Hyponatremia [E87.1]  Yes      Resolved Hospital Problems   No resolved problems to display.         Traumatic hemopneumothorax  - thoracostomy tube in place  - serosanguineous drainage  - at least as much fluid coming around the thoracostomy tube as through it  - no bronchopleural fistula  - 220 cc through the tube in the last 24 hours  - apical and basilar components of pneumothorax seen today, these were not visible yesterday  - chest x-ray in a.m.  Anemia  - a little lower  - SCDs instead of enoxaparin  Thrombocytopenia  - resolved  Right lower lobe contusion  Multiple rib fractures  - patient having pain controlled with hydrocodone

## 2023-07-17 NOTE — ASSESSMENT & PLAN NOTE
Mildly elevated. Stable. EKG shows TWIs at lateral leads. TTE unremarkable.  -Continue telemetry  -Cardiology consultation

## 2023-07-17 NOTE — PLAN OF CARE
Spoke with pt's gregg Jhoan regarding DC plan; he is asking if [facility] is an option, she was there in the past. I explained the difference between SNF and Rehab and the criteria for each. He wants to try [facility] as first choice and is also agreeable to SNF at a facility not in a nursing home setting. He is agreeable to sending the SNF referral to [facility] and [facility] in [place].   If the patient is not accepted to any of the above facilities and the pt is at a level that SNF is being recommended we will re-discuss other SNFs as an option. Jhoan Moraes gave verbal consent for Patient Choice completed.   Referrals sent via [facility] to [facility] and [facility].    07/17/23 1538   Post-Acute Status   Post-Acute Authorization Placement   Post-Acute Placement Status Referrals Sent

## 2023-07-17 NOTE — ASSESSMENT & PLAN NOTE
Improving.  -Chest tube placed by Dr. Baker in the ED  -Hold home anticoagulation  -Trend CBC  -Pulmonary consulted  -Serial CXRs

## 2023-07-17 NOTE — NURSING
Patient arrived to unit via bed. Patient awake alert and oriented. Patient denies any pain at this time. No acute distress noted a this time. Patient incision site clean dry intact with chest in place. No signs or air leak at this time. Bed in lowest position, call light within reach, bed alarm set. Plan of care continues.

## 2023-07-17 NOTE — NURSING
Nurses Note -- 4 Eyes      7/17/2023   2:54 PM      Skin assessed during: Transfer      [] No Altered Skin Integrity Present    []Prevention Measures Documented      [x] Yes- Altered Skin Integrity Present or Discovered   [] LDA Added if Not in Epic (Describe Wound)   [x] New Altered Skin Integrity was Present on Admit and Documented in LDA   [] Wound Image Taken    Wound Care Consulted? No    Attending Nurse:  Marlena Vickers LPN     Second RN/Staff Member:  Ana María Matta LPN

## 2023-07-17 NOTE — SUBJECTIVE & OBJECTIVE
"Interval History: see "Hospital Course"    Review of Systems   Respiratory:  Negative for shortness of breath.    Cardiovascular:  Negative for chest pain.   Skin:  Positive for pallor.   Objective:     Vital Signs (Most Recent):  Temp: 98.8 °F (37.1 °C) (07/17/23 0815)  Pulse: 90 (07/17/23 1101)  Resp: 19 (07/17/23 1050)  BP: (!) 110/53 (07/17/23 1101)  SpO2: 97 % (07/17/23 1101) Vital Signs (24h Range):  Temp:  [97.4 °F (36.3 °C)-98.8 °F (37.1 °C)] 98.8 °F (37.1 °C)  Pulse:  [] 90  Resp:  [16-22] 19  SpO2:  [91 %-100 %] 97 %  BP: (108-151)/(52-96) 110/53     Weight: 47.6 kg (104 lb 15 oz)  Body mass index is 18.01 kg/m².    Intake/Output Summary (Last 24 hours) at 7/17/2023 1129  Last data filed at 7/17/2023 0701  Gross per 24 hour   Intake 1080 ml   Output 520 ml   Net 560 ml         Physical Exam  Vitals and nursing note reviewed.   Constitutional:       Appearance: She is ill-appearing.   HENT:      Head: Normocephalic and atraumatic.      Right Ear: External ear normal.      Left Ear: External ear normal.      Nose: Nose normal.      Mouth/Throat:      Mouth: Mucous membranes are moist.      Pharynx: Oropharynx is clear.   Eyes:      Extraocular Movements: Extraocular movements intact.      Conjunctiva/sclera: Conjunctivae normal.   Cardiovascular:      Rate and Rhythm: Normal rate and regular rhythm.      Pulses: Normal pulses.      Heart sounds: Normal heart sounds.   Pulmonary:      Effort: Pulmonary effort is normal. No respiratory distress.      Breath sounds: No rales.      Comments: Decreased breath sounds at the bases. RA. Chest tube right base with serosanguinous fluid.  Abdominal:      General: Bowel sounds are normal.      Palpations: Abdomen is soft.   Musculoskeletal:         General: Normal range of motion.      Cervical back: Normal range of motion and neck supple.      Right lower leg: No edema.      Left lower leg: No edema.   Skin:     Coloration: Skin is pale.   Neurological:      " Mental Status: She is alert. Mental status is at baseline.   Psychiatric:         Mood and Affect: Mood normal.         Behavior: Behavior normal.           Significant Labs: All pertinent labs within the past 24 hours have been reviewed.    Significant Imaging: I have reviewed all pertinent imaging results/findings within the past 24 hours.

## 2023-07-17 NOTE — PLAN OF CARE
Spoke with the pt at the bedside regarding SNF at the time of discharge; she is agreeable but is asking that I call her grandson, Jhoan (ph#[phone]) to discuss facility options.   I called Jhoan, he is with his wife who is fixing to go in for a procedure and will call me back this afternoon to discuss.    07/17/23 1231   Discharge Reassessment   Assessment Type Discharge Planning Assessment   Discharge Plan discussed with: Patient   Discharge Plan A Skilled Nursing Facility

## 2023-07-17 NOTE — PLAN OF CARE
07/17/23 1537   Discharge Reassessment   Assessment Type Discharge Planning Reassessment   Discharge Plan A Skilled Nursing Facility   Discharge Plan B Rehab

## 2023-07-17 NOTE — PT/OT/SLP PROGRESS
Physical Therapy      Patient Name:  Alvina Guallpa   MRN:  [id number]    Patient not seen today secondary to  (tranferring to Med/Surg at this time). Will follow-up .

## 2023-07-17 NOTE — PROGRESS NOTES
[facility] Medicine  Progress Note    Patient Name: Alvina Guallpa  MRN: [id number]  Patient Class: IP- Inpatient   Admission Date: 7/13/2023  Length of Stay: 4 days  Attending Physician: Ramón Mario MD  Primary Care Provider: EDY Hurd        Subjective:     Principal Problem:Traumatic hemopneumothorax, initial encounter        HPI:  Patient is an 83-year-old female with a history of AFib, hypertension, hyperlipidemia, scoliosis with multiple chronic spinal degenerative changes with chronic back pain, history of pelvic fracture and hip fracture in the past who presents after tripping and falling at home.  She was recently replaced on Eliquis by her cardiologist due to episodes of AFib.  She tripped and fell at home onto her right side and developed severe pain in her side.  She went to the ER at [facility] and was evaluated and found to have rib fractures at level 8 and 9 in addition to what appeared to be multiple chronic spinal compression fractures on CT imaging.  CT of the chest reveals hemopneumothorax with small apical pneumothorax.  She was admitted there and monitored with repeat CT scan today showing enlarging hemopneumothorax.    Additionally, she was noted to have EKG changes with mildly elevated troponin though her troponin level trended down.  She is not having any chest pain.  She had an episode of hypotension that improved with IV fluids.    She was transferred per  transfer to [facility] for further evaluation.  EN route, per Pulmonary request the patient was redirected to the ER for further evaluation and chest tube placement.    On arrival in the ED:  The patient's vital signs are stable.  She is mildly tachycardic with heart rate in the low 100s.  She is comfortable and easily communicating.  She says she has back pain that is similar to her chronic back pain.  She has mild right rib pain.  Her pelvis is without pain at this time.  She has no  other complaints.  She is breathing easily.  I discussed with the ED provider and Dr. Baker has graciously assisted with placement of chest tube.  Additionally, Dr Baker discussed with Dr Weiss who will consult for any potential need of surgical involvement and will assist with management of the chest tube.  I discussed with Dr. Carrasco and with Dr. Dhaliwal from the Pulmonary team regarding further management.  The patient will be moved to the ICU and will complete trauma workup with scans once chest tube is placed.    See referring provider note below.    83-year-old woman with PMH HTN, TIA (on Eliquis), pacemaker, and osteoporosis adm to  on 07/12 after falling and hitting her right side on her wheelchair.  Patient said that she tripped.  There was no LOC and she did not hit her head.  ROS pos for right-sided chest wall pain.       On admission /93, HR 95.  Exam was pos for bruising on the right chest and arms and impaired recent memory.  Labs:  WBC 13.1, Hb 11.7, Na 131, K 4.9, Cr 1.2.      CT chest WO contrast pos for acute fractures of right ribs 8 -9 with small pneumothorax and extensive subcutaneous emphysema.  Small right hemothorax, right lower lung contusion/hematoma.  Also,, extensive compression deformities throughout the thoracic spine of uncertain chronicity.      EKG NSR notable for small T-wave inversion V2-4 which were not present on an earlier EKG (03/2020).      Patient became hypotensive this morning on her way to radiology for a repeat CT chest.  BP improved when patient placed in Trendelenburg and given IVF.      Repeat ECG  (829 h) pos for deep T-wave inversion in V2-6 which was not present on the earlier ECG. Troponin 0.037 > 0.013.      CT chest today pos for interval increase in right pleural effusion/ hemothorax with no significant change in small pneumothorax.  Also, increasing atelectasis of the right lower lobe obscuring the previously described hematoma/ contusion.   "Also, mild atelectasis on the left.       Patient complained of left hip pain this morning.  X-ray pos for left superior pubic ramus fracture of uncertain chronicity possibly acute.  also,, old intertrochanteric left hip fracture s/p ORIF.  Severe bony demineralization and degenerative changes noted.     VS (12 18 h) /62, , RR 15, SpO2 100% (2 L).  Labs WBC 13.1 > 10.2, Hb 11.7 > 9.7, Na 131> 132, Cr 1.2 > 0.8     Transfer requested for higher level of care.  Transfer diagnosis multiple right-sided rib fractures, right pulmonary contusion, right apical pneumothorax (small), rt > lt pleural effusion, left pubic ramus fracture of uncertain chronicity, EKG changes concerning for ischemia      Overview/Hospital Course:  Alvina Guallpa is an 83 year old female with a past medical history of Afib on Xarelto, cardiac pacemaker, HTN, anemia, HLD, and osteopenia with chronic thoracic, lumbar, left femur and pelvic fractures who presented with a mechanical fall leading to right sided 8th and 9th rib fractures with a hemopneumothorax. She had a chest tube placed in the ED upon transfer from [facility]. Her Hgb fell to 7 as there was significant output from the chest tube once placed. She received one unit of FFP and one unit of PRBCs. Home Xarelto is currently held. Hgb is being trended and has improved; output from the chest tube is also decreasing. Pulmonary is following. She is stable on room air currently. Daily serial CXRs show progressive improvement. Troponin is also being trended given mild elevation with TWIs at the lateral leads. Cardiology has been consulted and is considering stress testing and/or angiogram. Transfer orders to leave the ICU were placed 7/16. She is pending SNF placement once medically appropriate.      Interval History: see "Hospital Course"    Review of Systems   Respiratory:  Negative for shortness of breath.    Cardiovascular:  Negative for chest pain.   Skin:  Positive for pallor. "   Objective:     Vital Signs (Most Recent):  Temp: 98.8 °F (37.1 °C) (07/17/23 0815)  Pulse: 90 (07/17/23 1101)  Resp: 19 (07/17/23 1050)  BP: (!) 110/53 (07/17/23 1101)  SpO2: 97 % (07/17/23 1101) Vital Signs (24h Range):  Temp:  [97.4 °F (36.3 °C)-98.8 °F (37.1 °C)] 98.8 °F (37.1 °C)  Pulse:  [] 90  Resp:  [16-22] 19  SpO2:  [91 %-100 %] 97 %  BP: (108-151)/(52-96) 110/53     Weight: 47.6 kg (104 lb 15 oz)  Body mass index is 18.01 kg/m².    Intake/Output Summary (Last 24 hours) at 7/17/2023 1129  Last data filed at 7/17/2023 0701  Gross per 24 hour   Intake 1080 ml   Output 520 ml   Net 560 ml         Physical Exam  Vitals and nursing note reviewed.   Constitutional:       Appearance: She is ill-appearing.   HENT:      Head: Normocephalic and atraumatic.      Right Ear: External ear normal.      Left Ear: External ear normal.      Nose: Nose normal.      Mouth/Throat:      Mouth: Mucous membranes are moist.      Pharynx: Oropharynx is clear.   Eyes:      Extraocular Movements: Extraocular movements intact.      Conjunctiva/sclera: Conjunctivae normal.   Cardiovascular:      Rate and Rhythm: Normal rate and regular rhythm.      Pulses: Normal pulses.      Heart sounds: Normal heart sounds.   Pulmonary:      Effort: Pulmonary effort is normal. No respiratory distress.      Breath sounds: No rales.      Comments: Decreased breath sounds at the bases. RA. Chest tube right base with serosanguinous fluid.  Abdominal:      General: Bowel sounds are normal.      Palpations: Abdomen is soft.   Musculoskeletal:         General: Normal range of motion.      Cervical back: Normal range of motion and neck supple.      Right lower leg: No edema.      Left lower leg: No edema.   Skin:     Coloration: Skin is pale.   Neurological:      Mental Status: She is alert. Mental status is at baseline.   Psychiatric:         Mood and Affect: Mood normal.         Behavior: Behavior normal.           Significant Labs: All pertinent  labs within the past 24 hours have been reviewed.    Significant Imaging: I have reviewed all pertinent imaging results/findings within the past 24 hours.      Assessment/Plan:      * Traumatic hemopneumothorax, initial encounter  Improving.  -Chest tube placed by Dr. Baker in the ED  -Hold home anticoagulation  -Trend CBC  -Pulmonary consulted  -Serial CXRs      Fall  -Fall precautions  -PT/OT  -CM consulted for SNF placement    Acute posthemorrhagic anemia  Improving.  -S/p FFP and one unit PRBCs  -Hold anticoagulation  -Trend CBC      Troponin level elevated  Mildly elevated. Stable. EKG shows TWIs at lateral leads. TTE unremarkable.  -Continue telemetry  -Cardiology consultation    Right lower lobe lung mass  -Continue to monitor  -Pulmonary consulted  -Dedicated imaging once hemopneumothorax resolves      Lumbar compression fracture  Chronic.  -PT/OT  -PRN analgesics      HLD (hyperlipidemia)  -Continue statin      Atrial fibrillation  -Telemetry  -Hold anticoagulation  -Continue diltiazem    Closed fracture of ramus of left pubis  Chronic.  -PT/OT when able      Compression fracture of body of thoracic vertebra  Chronic.  -PRN analgesics  -PT/OT when able      Contusion of lung, closed, initial encounter  Secondary to fall.  -PRN analgesics      Hyponatremia  Mild. In setting of trauma.  -Continue to trend Na      Primary hypertension  -Hold home BP medications with exception of home diltiazem  -Continue to monitor      Cardiac pacemaker in situ  History of SSS. Stable.      Multiple closed fractures of ribs of right side  8th and 9th rib fracture. Stable.  -PRN analgesics        VTE Risk Mitigation (From admission, onward)         Ordered     IP VTE HIGH RISK PATIENT  Once         07/13/23 1650     Place sequential compression device  Until discontinued         07/13/23 1650                Discharge Planning   QUIQUE: 7/18/2023     Code Status: Full Code   Is the patient medically ready for discharge?:      Reason for patient still in hospital (select all that apply): Patient trending condition, Laboratory test, Treatment, Imaging, Consult recommendations and Pending disposition  Discharge Plan A: Home with family, Home Health                  Ramón Mario MD  Department of Hospital Medicine   [facility]

## 2023-07-18 LAB
ALBUMIN SERPL BCP-MCNC: 3 G/DL (ref 3.5–5.2)
ALP SERPL-CCNC: 59 U/L (ref 55–135)
ALT SERPL W/O P-5'-P-CCNC: 15 U/L (ref 10–44)
ANION GAP SERPL CALC-SCNC: 8 MMOL/L (ref 8–16)
AST SERPL-CCNC: 20 U/L (ref 10–40)
BASOPHILS # BLD AUTO: 0.04 K/UL (ref 0–0.2)
BASOPHILS NFR BLD: 0.3 % (ref 0–1.9)
BILIRUB SERPL-MCNC: 1.2 MG/DL (ref 0.1–1)
BUN SERPL-MCNC: 16 MG/DL (ref 8–23)
CALCIUM SERPL-MCNC: 8.6 MG/DL (ref 8.7–10.5)
CHLORIDE SERPL-SCNC: 98 MMOL/L (ref 95–110)
CO2 SERPL-SCNC: 28 MMOL/L (ref 23–29)
CREAT SERPL-MCNC: 0.5 MG/DL (ref 0.5–1.4)
DIFFERENTIAL METHOD: ABNORMAL
EOSINOPHIL # BLD AUTO: 0.4 K/UL (ref 0–0.5)
EOSINOPHIL NFR BLD: 3.4 % (ref 0–8)
ERYTHROCYTE [DISTWIDTH] IN BLOOD BY AUTOMATED COUNT: 13.1 % (ref 11.5–14.5)
EST. GFR  (NO RACE VARIABLE): >60 ML/MIN/1.73 M^2
GLUCOSE SERPL-MCNC: 115 MG/DL (ref 70–110)
HCT VFR BLD AUTO: 30.9 % (ref 37–48.5)
HGB BLD-MCNC: 10 G/DL (ref 12–16)
IMM GRANULOCYTES # BLD AUTO: 0.04 K/UL (ref 0–0.04)
IMM GRANULOCYTES NFR BLD AUTO: 0.3 % (ref 0–0.5)
LYMPHOCYTES # BLD AUTO: 1.5 K/UL (ref 1–4.8)
LYMPHOCYTES NFR BLD: 12.3 % (ref 18–48)
MAGNESIUM SERPL-MCNC: 1.9 MG/DL (ref 1.6–2.6)
MCH RBC QN AUTO: 30.8 PG (ref 27–31)
MCHC RBC AUTO-ENTMCNC: 32.4 G/DL (ref 32–36)
MCV RBC AUTO: 95 FL (ref 82–98)
MONOCYTES # BLD AUTO: 1.2 K/UL (ref 0.3–1)
MONOCYTES NFR BLD: 9.7 % (ref 4–15)
NEUTROPHILS # BLD AUTO: 9.1 K/UL (ref 1.8–7.7)
NEUTROPHILS NFR BLD: 74 % (ref 38–73)
NRBC BLD-RTO: 0 /100 WBC
PLATELET # BLD AUTO: 272 K/UL (ref 150–450)
PMV BLD AUTO: 12.3 FL (ref 9.2–12.9)
POTASSIUM SERPL-SCNC: 4.6 MMOL/L (ref 3.5–5.1)
PROCALCITONIN SERPL IA-MCNC: <0.05 NG/ML (ref 0–0.5)
PROT SERPL-MCNC: 5.7 G/DL (ref 6–8.4)
RBC # BLD AUTO: 3.25 M/UL (ref 4–5.4)
SODIUM SERPL-SCNC: 134 MMOL/L (ref 136–145)
WBC # BLD AUTO: 12.34 K/UL (ref 3.9–12.7)

## 2023-07-18 PROCEDURE — 25000003 PHARM REV CODE 250: Performed by: INTERNAL MEDICINE

## 2023-07-18 PROCEDURE — 25000003 PHARM REV CODE 250: Performed by: STUDENT IN AN ORGANIZED HEALTH CARE EDUCATION/TRAINING PROGRAM

## 2023-07-18 PROCEDURE — 94799 UNLISTED PULMONARY SVC/PX: CPT

## 2023-07-18 PROCEDURE — 85025 COMPLETE CBC W/AUTO DIFF WBC: CPT | Performed by: STUDENT IN AN ORGANIZED HEALTH CARE EDUCATION/TRAINING PROGRAM

## 2023-07-18 PROCEDURE — 97116 GAIT TRAINING THERAPY: CPT

## 2023-07-18 PROCEDURE — 84145 PROCALCITONIN (PCT): CPT | Performed by: STUDENT IN AN ORGANIZED HEALTH CARE EDUCATION/TRAINING PROGRAM

## 2023-07-18 PROCEDURE — 25000003 PHARM REV CODE 250

## 2023-07-18 PROCEDURE — 80053 COMPREHEN METABOLIC PANEL: CPT | Performed by: STUDENT IN AN ORGANIZED HEALTH CARE EDUCATION/TRAINING PROGRAM

## 2023-07-18 PROCEDURE — 83735 ASSAY OF MAGNESIUM: CPT | Performed by: STUDENT IN AN ORGANIZED HEALTH CARE EDUCATION/TRAINING PROGRAM

## 2023-07-18 PROCEDURE — 99233 PR SUBSEQUENT HOSPITAL CARE,LEVL III: ICD-10-PCS | Mod: ,,, | Performed by: INTERNAL MEDICINE

## 2023-07-18 PROCEDURE — 99233 SBSQ HOSP IP/OBS HIGH 50: CPT | Mod: ,,, | Performed by: INTERNAL MEDICINE

## 2023-07-18 PROCEDURE — 21400001 HC TELEMETRY ROOM

## 2023-07-18 PROCEDURE — 36415 COLL VENOUS BLD VENIPUNCTURE: CPT | Performed by: STUDENT IN AN ORGANIZED HEALTH CARE EDUCATION/TRAINING PROGRAM

## 2023-07-18 PROCEDURE — 94761 N-INVAS EAR/PLS OXIMETRY MLT: CPT

## 2023-07-18 RX ADMIN — BACLOFEN 10 MG: 10 TABLET ORAL at 10:07

## 2023-07-18 RX ADMIN — TRAZODONE HYDROCHLORIDE 50 MG: 50 TABLET ORAL at 09:07

## 2023-07-18 RX ADMIN — HYDROCODONE BITARTRATE AND ACETAMINOPHEN 1 TABLET: 5; 325 TABLET ORAL at 02:07

## 2023-07-18 RX ADMIN — METOPROLOL TARTRATE 12.5 MG: 25 TABLET, FILM COATED ORAL at 09:07

## 2023-07-18 RX ADMIN — HYDROCODONE BITARTRATE AND ACETAMINOPHEN 1 TABLET: 5; 325 TABLET ORAL at 04:07

## 2023-07-18 RX ADMIN — HYDROCODONE BITARTRATE AND ACETAMINOPHEN 1 TABLET: 5; 325 TABLET ORAL at 08:07

## 2023-07-18 RX ADMIN — DILTIAZEM HYDROCHLORIDE 120 MG: 120 CAPSULE, COATED, EXTENDED RELEASE ORAL at 08:07

## 2023-07-18 RX ADMIN — MUPIROCIN 1 G: 20 OINTMENT TOPICAL at 08:07

## 2023-07-18 RX ADMIN — ATORVASTATIN CALCIUM 10 MG: 10 TABLET, FILM COATED ORAL at 08:07

## 2023-07-18 RX ADMIN — METOPROLOL TARTRATE 12.5 MG: 25 TABLET, FILM COATED ORAL at 08:07

## 2023-07-18 RX ADMIN — HYDROCODONE BITARTRATE AND ACETAMINOPHEN 1 TABLET: 5; 325 TABLET ORAL at 07:07

## 2023-07-18 NOTE — PROGRESS NOTES
[facility]  Progress Note    Patient Name: Alvina Guallpa  MRN: [id number]  Patient Class: IP- Inpatient   Admission Date: 7/13/2023  Length of Stay: 5 days  Attending Physician: Armaan Sahu MD  Primary Care Provider: EDY Hurd        Subjective:     Principal Problem:Traumatic hemopneumothorax, initial encounter        HPI:  Patient is an 83-year-old female with a history of AFib, hypertension, hyperlipidemia, scoliosis with multiple chronic spinal degenerative changes with chronic back pain, history of pelvic fracture and hip fracture in the past who presents after tripping and falling at home.  She was recently replaced on Eliquis by her cardiologist due to episodes of AFib.  She tripped and fell at home onto her right side and developed severe pain in her side.  She went to the ER at [place] and was evaluated and found to have rib fractures at level 8 and 9 in addition to what appeared to be multiple chronic spinal compression fractures on CT imaging.  CT of the chest reveals hemopneumothorax with small apical pneumothorax.  She was admitted there and monitored with repeat CT scan today showing enlarging hemopneumothorax.    Additionally, she was noted to have EKG changes with mildly elevated troponin though her troponin level trended down.  She is not having any chest pain.  She had an episode of hypotension that improved with IV fluids.    She was transferred per  transfer to [facility] for further evaluation.  EN route, per Pulmonary request the patient was redirected to the ER for further evaluation and chest tube placement.    On arrival in the ED:  The patient's vital signs are stable.  She is mildly tachycardic with heart rate in the low 100s.  She is comfortable and easily communicating.  She says she has back pain that is similar to her chronic back pain.  She has mild right rib pain.  Her pelvis is without pain at this time.  She has no  other complaints.  She is breathing easily.  I discussed with the ED provider and Dr. Baker has graciously assisted with placement of chest tube.  Additionally, Dr Baker discussed with Dr Weiss who will consult for any potential need of surgical involvement and will assist with management of the chest tube.  I discussed with Dr. Carrasco and with Dr. Dhaliwal from the Pulmonary team regarding further management.  The patient will be moved to the ICU and will complete trauma workup with scans once chest tube is placed.    See referring provider note below.    83-year-old woman with PMH HTN, TIA (on Eliquis), pacemaker, and osteoporosis adm to  on 07/12 after falling and hitting her right side on her wheelchair.  Patient said that she tripped.  There was no LOC and she did not hit her head.  ROS pos for right-sided chest wall pain.       On admission /93, HR 95.  Exam was pos for bruising on the right chest and arms and impaired recent memory.  Labs:  WBC 13.1, Hb 11.7, Na 131, K 4.9, Cr 1.2.      CT chest WO contrast pos for acute fractures of right ribs 8 -9 with small pneumothorax and extensive subcutaneous emphysema.  Small right hemothorax, right lower lung contusion/hematoma.  Also,, extensive compression deformities throughout the thoracic spine of uncertain chronicity.      EKG NSR notable for small T-wave inversion V2-4 which were not present on an earlier EKG (03/2020).      Patient became hypotensive this morning on her way to radiology for a repeat CT chest.  BP improved when patient placed in Trendelenburg and given IVF.      Repeat ECG  (829 h) pos for deep T-wave inversion in V2-6 which was not present on the earlier ECG. Troponin 0.037 > 0.013.      CT chest today pos for interval increase in right pleural effusion/ hemothorax with no significant change in small pneumothorax.  Also, increasing atelectasis of the right lower lobe obscuring the previously described hematoma/ contusion.   Also, mild atelectasis on the left.       Patient complained of left hip pain this morning.  X-ray pos for left superior pubic ramus fracture of uncertain chronicity possibly acute.  also,, old intertrochanteric left hip fracture s/p ORIF.  Severe bony demineralization and degenerative changes noted.     VS (12 18 h) /62, , RR 15, SpO2 100% (2 L).  Labs WBC 13.1 > 10.2, Hb 11.7 > 9.7, Na 131> 132, Cr 1.2 > 0.8     Transfer requested for higher level of care.  Transfer diagnosis multiple right-sided rib fractures, right pulmonary contusion, right apical pneumothorax (small), rt > lt pleural effusion, left pubic ramus fracture of uncertain chronicity, EKG changes concerning for ischemia      Overview/Hospital Course:  Alvina Guallpa is an 83 year old female with a past medical history of Afib on Xarelto, cardiac pacemaker, HTN, anemia, HLD, and osteopenia with chronic thoracic, lumbar, left femur and pelvic fractures who presented with a mechanical fall leading to right sided 8th and 9th rib fractures with a hemopneumothorax. She had a chest tube placed in the ED upon transfer from [place]. Her Hgb fell to 7 as there was significant output from the chest tube once placed. She received one unit of FFP and one unit of PRBCs. Home Xarelto is currently held. Hgb is being trended and has improved; output from the chest tube is also decreasing. Pulmonary is following. She is stable on room air currently. Daily serial CXRs show progressive improvement. Troponin is also being trended given mild elevation with TWIs at the lateral leads. Cardiology has been consulted and is considering stress testing and/or angiogram. Transfer orders to leave the ICU were placed 7/16. She is pending SNF placement once medically appropriate.      Interval History: No acute events overnight. She continues to have output from chest tube. Pending SNF placement. Some pain today, but otherwise feeling well    Review of Systems   All other  systems reviewed and are negative.  Objective:     Vital Signs (Most Recent):  Temp: 98.9 °F (37.2 °C) (07/18/23 1316)  Pulse: 108 (07/18/23 1316)  Resp: 20 (07/18/23 1410)  BP: 126/64 (07/18/23 1316)  SpO2: (!) 94 % (07/18/23 1316) Vital Signs (24h Range):  Temp:  [97.9 °F (36.6 °C)-99.5 °F (37.5 °C)] 98.9 °F (37.2 °C)  Pulse:  [] 108  Resp:  [18-22] 20  SpO2:  [91 %-94 %] 94 %  BP: (126-148)/(64-81) 126/64     Weight: 47.6 kg (104 lb 15 oz)  Body mass index is 18.01 kg/m².    Intake/Output Summary (Last 24 hours) at 7/18/2023 1432  Last data filed at 7/18/2023 1225  Gross per 24 hour   Intake 360 ml   Output 590 ml   Net -230 ml         Physical Exam  Constitutional:       Appearance: Normal appearance.      Comments: frail   HENT:      Head: Normocephalic and atraumatic.      Nose: Nose normal.      Mouth/Throat:      Mouth: Mucous membranes are moist.   Eyes:      Conjunctiva/sclera: Conjunctivae normal.      Pupils: Pupils are equal, round, and reactive to light.   Cardiovascular:      Rate and Rhythm: Normal rate and regular rhythm.      Pulses: Normal pulses.      Heart sounds: Normal heart sounds. No murmur heard.    No friction rub. No gallop.   Pulmonary:      Effort: Pulmonary effort is normal.      Breath sounds: Normal breath sounds. No wheezing or rales.      Comments: Right sided chest tube in place  Abdominal:      General: Abdomen is flat. Bowel sounds are normal. There is no distension.      Palpations: Abdomen is soft.      Tenderness: There is no abdominal tenderness. There is no guarding.   Musculoskeletal:         General: No swelling. Normal range of motion.      Cervical back: Normal range of motion and neck supple.   Skin:     General: Skin is warm and dry.   Neurological:      General: No focal deficit present.      Mental Status: She is alert.   Psychiatric:         Mood and Affect: Mood normal.         Thought Content: Thought content normal.         Judgment: Judgment normal.            Significant Labs: All pertinent labs within the past 24 hours have been reviewed.    Significant Imaging: I have reviewed all pertinent imaging results/findings within the past 24 hours.      Assessment/Plan:      * Traumatic hemopneumothorax, initial encounter  Improving.  -Chest tube placed by Dr. Baker in the ED  -Hold home anticoagulation  -Trend CBC  -Pulmonary consulted  -Serial CXRs  - chest tube to be removed when decreasing output      Right lower lobe lung mass  -Continue to monitor  -Pulmonary consulted  -Dedicated imaging once hemopneumothorax resolves      Lumbar compression fracture  Chronic.  -PT/OT  -PRN analgesics      HLD (hyperlipidemia)  -Continue statin      Atrial fibrillation  -Telemetry  -Hold anticoagulation  -Continue diltiazem    Troponin level elevated  Mildly elevated. Stable. EKG shows TWIs at lateral leads. TTE unremarkable.  -Continue telemetry  -Cardiology consultation    Fall  -Fall precautions  -PT/OT  -CM consulted for SNF placement    Closed fracture of ramus of left pubis  Chronic.  -PT/OT when able  - pending SNF placement      Compression fracture of body of thoracic vertebra  Chronic.  -PRN analgesics  -PT/OT when able      Contusion of lung, closed, initial encounter  Secondary to fall.  -PRN analgesics      Hyponatremia  Mild. In setting of trauma.  -Continue to trend Na      Acute posthemorrhagic anemia  Improving.  -S/p FFP and one unit PRBCs  -Hold anticoagulation  -Trend CBC > improving      Primary hypertension  -Hold home BP medications with exception of home diltiazem  -Continue to monitor      Cardiac pacemaker in situ  History of SSS. Stable.      Multiple closed fractures of ribs of right side  8th and 9th rib fracture. Stable.  -PRN analgesics        VTE Risk Mitigation (From admission, onward)         Ordered     IP VTE HIGH RISK PATIENT  Once         07/13/23 1650     Place sequential compression device  Until discontinued         07/13/23 1650                 Discharge Planning   QUIQUE: 7/18/2023     Code Status: Full Code   Is the patient medically ready for discharge?:     Reason for patient still in hospital (select all that apply): Treatment  Discharge Plan A: Skilled Nursing Facility                  Armaan Sahu MD  Department of Hospital Medicine   [facility]

## 2023-07-18 NOTE — ASSESSMENT & PLAN NOTE
Improving.  -Chest tube placed by Dr. Baker in the ED  -Hold home anticoagulation  -Trend CBC  -Pulmonary consulted  -Serial CXRs  - chest tube to be removed when decreasing output

## 2023-07-18 NOTE — PT/OT/SLP PROGRESS
"Physical Therapy Treatment    Patient Name:  Alvina Guallpa   MRN:  [id number]    Recommendations:     Discharge Recommendations: nursing facility, skilled  Discharge Equipment Recommendations: to be determined by next level of care  Barriers to discharge:  increased caregiver burden of care    Assessment:     Alvina Guallpa is a 83 y.o. female admitted with a medical diagnosis of Traumatic hemopneumothorax, initial encounter.  She presents with the following impairments/functional limitations: weakness, impaired endurance, impaired functional mobility, gait instability, impaired cardiopulmonary response to activity .    Pt presented in supine. Chest tube was removed  earlier this AM. Pt t/f'ed supine to sit  slowly with Min A , sit to stand with CGA. She ambulated 20 ft x2 RW and CGA with report of  feeling "woozy." Pt had minimal dyspnea that resolved with return to supine.    Rehab Prognosis: Good; patient would benefit from acute skilled PT services to address these deficits and reach maximum level of function.    Recent Surgery: * No surgery found *      Plan:     During this hospitalization, patient to be seen daily to address the identified rehab impairments via gait training, therapeutic activities, therapeutic exercises and progress toward the following goals:    Plan of Care Expires:  08/15/23    Subjective     Chief Complaint: CLEMENT  Patient/Family Comments/goals: PLOF  Pain/Comfort:         Objective:     Communicated with nurse prior to session.  Patient found supine with telemetry, bed alarm upon PT entry to room.     General Precautions: Standard, fall  Orthopedic Precautions:    Braces:    Respiratory Status: Room air     Functional Mobility:  Bed Mobility:     Supine to Sit: minimum assistance  Sit to Supine: contact guard assistance  Transfers:     Sit to Stand:  minimum assistance with rolling walker  Gait: 20 ft x2 RW and CGA. Brief standing rest due to dyspnea and fatigue      AM-PAC 6 " CLICK MOBILITY          Treatment & Education:  Educated on use of call light, the benefit of increased time OOB. Gait in the rob with RW    Patient left supine with all lines intact, call button in reach, bed alarm on, and nurse notified..    GOALS:   Multidisciplinary Problems       Physical Therapy Goals          Problem: Physical Therapy    Goal Priority Disciplines Outcome Goal Variances Interventions   Physical Therapy Goal     PT, PT/OT Ongoing, Progressing     Description: Goals to be met by: 8/15/2023     Patient will increase functional independence with mobility by performin. Supine to sit with Stand-by Assistance  2. Sit to stand transfer with Stand-by Assistance  3. Gait  x 150 feet with Stand-by Assistance using Rolling Walker.                          Time Tracking:     PT Received On: 23  PT Start Time: 1133     PT Stop Time: 1144  PT Total Time (min): 11 min     Billable Minutes: Gait Training 11 minutes    Treatment Type: Evaluation  PT/PTA: PT     Number of PTA visits since last PT visit: 0     2023

## 2023-07-18 NOTE — PROGRESS NOTES
[facility]  Department of Cardiology  Consult Note      PATIENT NAME: Alvina Guallpa  MRN: [id number]  TODAY'S DATE: 07/18/2023  ADMIT DATE: 7/13/2023                          CONSULT REQUESTED BY: Armaan Sahu MD    SUBJECTIVE     PRINCIPAL PROBLEM: Traumatic hemopneumothorax, initial encounter      REASON FOR CONSULT:    History of R rib fractures with hemopneumothorax; EKG changes with TWI in lateral leads, mildly elevated troponin      Interval history  7/18/23  Resting in bed; States she just choked and had coughing spell  Right chest tube removed today, resolution of pneumothorax  Pt had brief run SVT this morning around 6am  She denies chest pain or palpitations;   Low grade fevers overnight    07/16/2023    Patient is resting comfortably in bed during examination.  No acute distress.  Room air.  95% O2 saturation.  Rate 1 0.  Sinus tachycardia on telemetry.  Breathing improving.    HPI:    Patient is an 83-year-old female with past medical history anxiety depression, atrial fibrillation on anticoagulation, hypertension, TIA, permanent pacemaker who presented to the ED after tripping and falling at home.   She was evaluated at the ER at [place] and was found to have rib fractures 8 and 9.  CT of the chest revealed hemopneumothorax with small apical pneumothorax.  Repeat CT scan showed enlarging hemopneumothorax and patient was transferred to [facility].  Patient was also noted to have mildly elevated troponin with T-wave inversions in the inferior lateral leads on EKG.  Chest tube was placed in ED. patient was significantly anemic after chest tube placement and received 1 unit of FFP and 1 unit of PRBC.    7/15/23  Patient remains in ICU.  T-wave inversion inferior lateral leads on initial EKGs this admission. Resolved on EKG this am.  H&H 9/26.4, K 4.1, creatinine 0 point, magnesium 2.1.; chest x-ray this morning shows right thoracostomy tube unchanged in position with  persistent right lower lung airspace opacity and trace right apical pneumothorax.  Echocardiogram pending.  Chest tube leaking around insertion site.  Patient states she is feeling better.     Troponin HS this admission: 27.9, > 35.2, > 38.8, > 20.6      CT chest WO contrast pos for acute fractures of right ribs 8 -9 with small pneumothorax and extensive subcutaneous emphysema.  Small right hemothorax, right lower lung contusion/hematoma.  Also,, extensive compression deformities throughout the thoracic spine of uncertain chronicity.     FROM H&P     HPI: Patient is an 83-year-old female with a history of AFib, hypertension, hyperlipidemia, scoliosis with multiple chronic spinal degenerative changes with chronic back pain, history of pelvic fracture and hip fracture in the past who presents after tripping and falling at home.  She was recently replaced on Eliquis by her cardiologist due to episodes of AFib.  She tripped and fell at home onto her right side and developed severe pain in her side.  She went to the ER at [place] and was evaluated and found to have rib fractures at level 8 and 9 in addition to what appeared to be multiple chronic spinal compression fractures on CT imaging.  CT of the chest reveals hemopneumothorax with small apical pneumothorax.  She was admitted there and monitored with repeat CT scan today showing enlarging hemopneumothorax.     Additionally, she was noted to have EKG changes with mildly elevated troponin though her troponin level trended down.  She is not having any chest pain.  She had an episode of hypotension that improved with IV fluids.     She was transferred per  transfer to [facility] for further evaluation.  EN route, per Pulmonary request the patient was redirected to the ER for further evaluation and chest tube placement.     On arrival in the ED:  The patient's vital signs are stable.  She is mildly tachycardic with heart rate in the low 100s.  She is  comfortable and easily communicating.  She says she has back pain that is similar to her chronic back pain.  She has mild right rib pain.  Her pelvis is without pain at this time.  She has no other complaints.  She is breathing easily.  I discussed with the ED provider and Dr. Baker has graciously assisted with placement of chest tube.  Additionally, Dr Baker discussed with Dr Weiss who will consult for any potential need of surgical involvement and will assist with management of the chest tube.  I discussed with Dr. Carrasco and with Dr. Dhaliwal from the Pulmonary team regarding further management.  The patient will be moved to the ICU and will complete trauma workup with scans once chest tube is placed.     See referring provider note below.     83-year-old woman with PMH HTN, TIA (on Eliquis), pacemaker, and osteoporosis adm to  on 07/12 after falling and hitting her right side on her wheelchair.  Patient said that she tripped.  There was no LOC and she did not hit her head.  ROS pos for right-sided chest wall pain.       On admission /93, HR 95.  Exam was pos for bruising on the right chest and arms and impaired recent memory.  Labs:  WBC 13.1, Hb 11.7, Na 131, K 4.9, Cr 1.2.      CT chest WO contrast pos for acute fractures of right ribs 8 -9 with small pneumothorax and extensive subcutaneous emphysema.  Small right hemothorax, right lower lung contusion/hematoma.  Also,, extensive compression deformities throughout the thoracic spine of uncertain chronicity.      EKG NSR notable for small T-wave inversion V2-4 which were not present on an earlier EKG (03/2020).      Patient became hypotensive this morning on her way to radiology for a repeat CT chest.  BP improved when patient placed in Trendelenburg and given IVF.      Repeat ECG  (829 h) pos for deep T-wave inversion in V2-6 which was not present on the earlier ECG. Troponin 0.037 > 0.013.      CT chest today pos for interval increase in right  pleural effusion/ hemothorax with no significant change in small pneumothorax.  Also, increasing atelectasis of the right lower lobe obscuring the previously described hematoma/ contusion.  Also, mild atelectasis on the left.       Patient complained of left hip pain this morning.  X-ray pos for left superior pubic ramus fracture of uncertain chronicity possibly acute.  also,, old intertrochanteric left hip fracture s/p ORIF.  Severe bony demineralization and degenerative changes noted.     VS (12 18 h) /62, , RR 15, SpO2 100% (2 L).  Labs WBC 13.1 > 10.2, Hb 11.7 > 9.7, Na 131> 132, Cr 1.2 > 0.8     Transfer requested for higher level of care.  Transfer diagnosis multiple right-sided rib fractures, right pulmonary contusion, right apical pneumothorax (small), rt > lt pleural effusion, left pubic ramus fracture of uncertain chronicity, EKG changes concerning for ischemia         Review of patient's allergies indicates:   Allergen Reactions    Penicillins Hives       Past Medical History:   Diagnosis Date    Anxiety     Chronic neck pain     Depression     Hypertension     Osteoporosis     Seasonal allergies     TIA (transient ischemic attack)      Past Surgical History:   Procedure Laterality Date    HYSTERECTOMY      INSERTION OF PACEMAKER      NASAL POLYP SURGERY Bilateral     NECK SURGERY       Social History     Tobacco Use    Smoking status: Never    Smokeless tobacco: Never   Substance Use Topics    Alcohol use: No     Alcohol/week: 0.0 standard drinks    Drug use: No        REVIEW OF SYSTEMS  CONSTITUTIONAL: episodes of feeling hot then cold   NEURO: + headaches, since sustaining fall at home  RESPIRATORY: +cough, shortness of breath   CARDIOVASCULAR: + right chest wall tenderness  GI: Negative for abdominal pain, No melena, diarrhea, nausea and vomiting.   : Negative for dysuria and frequency, Negative for hematuria  SKIN: +bruising,    ENDOCRINE: Negative for polyphagia, Negative for heat  intolerance, Negative for cold intolerance  PSYCHIATRIC: Negative for depression, Negative for anxiety, Negative for memory loss  MUSCULOSKELETAL: generalized weakness    OBJECTIVE     VITAL SIGNS (Most Recent)  Temp: 98.9 °F (37.2 °C) (07/18/23 1316)  Pulse: 108 (07/18/23 1316)  Resp: 20 (07/18/23 1410)  BP: 126/64 (07/18/23 1316)  SpO2: (!) 94 % (07/18/23 1316)    VENTILATION STATUS  Resp: 20 (07/18/23 1410)  SpO2: (!) 94 % (07/18/23 1316)           I & O (Last 24H):  Intake/Output Summary (Last 24 hours) at 7/18/2023 1418  Last data filed at 7/18/2023 1225  Gross per 24 hour   Intake 360 ml   Output 590 ml   Net -230 ml         WEIGHTS  Wt Readings from Last 3 Encounters:   07/13/23 2001 47.6 kg (104 lb 15 oz)   07/15/23 1431 47.2 kg (104 lb)   07/12/23 1949 46.7 kg (103 lb)       PHYSICAL EXAM  GENERAL: elderly female in no apparent distress alert and oriented.   HEENT: Normocephalic. Pupils normal and conjunctivae normal.    NECK: No JVD. No bruit..   THYROID: Thyroid not examined  CARDIAC: Regular rate and rhythm.  ST at times; S1 is normal.S2 is normal. +Systolic murmur  CHEST ANATOMY: normal.   LUNGS:  right lateral chest wall dressing in place post CT removal; Pt sitting up in bed, breathing comfortably after coughing spell  ABDOMEN: Soft nontender, normal BS  URINARY: No tenorio catheter   EXTREMITIES: right hip large purple bruising from fall at home PTA  PERIPHERAL VASCULAR SYSTEM: Good palpable distal pulses.   CENTRAL NERVOUS SYSTEM: No focal motor or sensory deficits noted.   SKIN: Skin without lesions, moist, well perfused.   MUSCLE STRENGTH & TONE: Moves all extremities     HOME MEDICATIONS:  No current facility-administered medications on file prior to encounter.     Current Outpatient Medications on File Prior to Encounter   Medication Sig Dispense Refill    aspirin (ECOTRIN) 81 MG EC tablet Take 81 mg by mouth once daily.      atorvastatin (LIPITOR) 10 MG tablet Take 10 mg by mouth once daily.       baclofen (LIORESAL) 10 MG tablet Take 10 mg by mouth 2 (two) times daily. prn      budesonide (PULMICORT) 0.5 mg/2 mL nebulizer solution Take 2 mLs (0.5 mg total) by nebulization 2 (two) times daily. For use in saline irrigation as directed. 360 mL 0    celecoxib (CELEBREX) 100 MG capsule Take 100 mg by mouth 2 (two) times daily.      clobetasoL (TEMOVATE) 0.05 % cream Apply topically every evening. X 2 weeks, then as needed. 30 g 1    cloNIDine (CATAPRES) 0.1 MG tablet Take 0.1 mg by mouth once daily.      diltiaZEM HCl 120 mg Cp12 Take 1 capsule by mouth every 12 (twelve) hours.      estradioL (ESTRACE) 0.01 % (0.1 mg/gram) vaginal cream Apply 1 gram vaginally daily for two weeks and then twice weekly 42.5 g 2    FOLIC ACID/MULTIVIT-MIN/LUTEIN (CENTRUM SILVER ORAL) Take by mouth.      HYDROcodone-acetaminophen (NORCO) 5-325 mg per tablet Take 1 tablet by mouth every 6 (six) hours as needed for Pain.      HYDROcodone-acetaminophen (NORCO) 7.5-325 mg per tablet Take 1 tablet by mouth 2 (two) times daily.      lisinopriL 10 MG tablet Take 10 mg by mouth.      rivaroxaban (XARELTO) 10 mg Tab Take 10 mg by mouth daily with dinner or evening meal.      traZODone (DESYREL) 50 MG tablet Take 50 mg by mouth every evening.         SCHEDULED MEDS:   atorvastatin  10 mg Oral Daily    diltiaZEM  120 mg Oral Daily    metoprolol tartrate  12.5 mg Oral BID    mupirocin   Nasal BID    traZODone  50 mg Oral QHS       CONTINUOUS INFUSIONS:    PRN MEDS:acetaminophen, baclofen, HYDROcodone-acetaminophen, morphine, naloxone, ondansetron, prochlorperazine, sodium chloride 0.9%    LABS AND DIAGNOSTICS     CBC LAST 3 DAYS  Recent Labs   Lab 07/16/23  0334 07/17/23  0559 07/18/23  0427   WBC 10.70 10.19 12.34   RBC 3.01* 2.86* 3.25*   HGB 9.1* 8.8* 10.0*   HCT 28.2* 26.7* 30.9*   MCV 94 93 95   MCH 30.2 30.8 30.8   MCHC 32.3 33.0 32.4   RDW 13.1 12.7 13.1    203 272   MPV 12.5 12.3 12.3   GRAN 81.9*  8.8* 76.8*  7.8* 74.0*  9.1*    LYMPH 6.8*  0.7* 9.3*  1.0 12.3*  1.5   MONO 7.9  0.8 9.7  1.0 9.7  1.2*   BASO 0.04 0.04 0.04   NRBC 0 0 0         COAGULATION LAST 3 DAYS  Recent Labs   Lab 07/12/23  2126 07/13/23  2047   INR 1.1 1.0   APTT <21.0  --          CHEMISTRY LAST 3 DAYS  Recent Labs   Lab 07/16/23  0333 07/17/23  0559 07/18/23  0427   * 131* 134*   K 4.0 3.8 4.6    97 98   CO2 31* 28 28   ANIONGAP 3* 6* 8   BUN 21 15 16   CREATININE 0.6 0.5 0.5   * 112* 115*   CALCIUM 8.8 8.2* 8.6*   MG 1.9 1.8 1.9   ALBUMIN 3.3* 3.0* 3.0*   PROT 5.4* 5.3* 5.7*   ALKPHOS 52* 51* 59   ALT 15 15 15   AST 21 19 20   BILITOT 1.1* 0.9 1.2*         CARDIAC PROFILE LAST 3 DAYS  Recent Labs   Lab 07/13/23  0158 07/13/23  0833 07/13/23  1128 07/13/23  2047 07/14/23  1141 07/14/23  1810 07/15/23  0933   *  --   --   --   --   --   --    TROPONINI  --  0.037* 0.013  --   --   --   --    TROPONINIHS  --   --   --    < > 35.2* 38.8* 20.6*    < > = values in this interval not displayed.         ENDOCRINE LAST 3 DAYS  No results for input(s): TSH, PROCAL in the last 168 hours.    LAST ARTERIAL BLOOD GAS  ABG  No results for input(s): PH, PO2, PCO2, HCO3, BE in the last 168 hours.    LAST 7 DAYS MICROBIOLOGY   Microbiology Results (last 7 days)       ** No results found for the last 168 hours. **            MOST RECENT IMAGING  X-Ray Chest AP Portable  Portable chest x-ray at 6:21 AM is compared to prior study dated 7/17/2023    Clinical history is hydropneumothorax    The right chest tube is in stable position. There is resolution of the right apical pneumothorax. There is persistent small right pleural effusion with right lower lobe airspace disease. There is a tiny left pleural effusion The left lung is clear.  The cardiomediastinal silhouette is normal in size. There is a left subclavian vein pacemaker in stable position. The there is been prior cervical fusion. There are degenerative changes of both shoulders.    IMPRESSION:  Resolution of the tiny right apical pneumothorax with small right pleural effusion and right lower lobe airspace disease    Tiny left pleural effusion    Electronically signed by:  Jeny Rodriguez MD  7/18/2023 8:24 AM CDT Workstation: [id number]      ECHOCARDIOGRAM RESULTS (last 5)  No results found for this or any previous visit.      CURRENT/PREVIOUS VISIT EKG  Results for orders placed or performed during the hospital encounter of 07/13/23   EKG 12-lead    Collection Time: 07/15/23 11:58 AM    Narrative    Test Reason : R77.8,    Vent. Rate : 096 BPM     Atrial Rate : 096 BPM     P-R Int : 152 ms          QRS Dur : 112 ms      QT Int : 368 ms       P-R-T Axes : 000 -42 117 degrees     QTc Int : 464 ms    Sinus rhythm with occasional atrial-paced complexes and Premature  supraventricular complexes  Left axis deviation  Inferior infarct (cited on or before 14-JUL-2023)  Anterolateral infarct (cited on or before 14-JUL-2023)  Abnormal ECG  When compared with ECG of 14-JUL-2023 03:37,  Electronic atrial pacemaker has replaced Sinus rhythm  Serial changes of evolving Anterior infarct Present  Serial changes of evolving Anterolateral infarct Present    Referred By: WILLIAM REGALADO           Confirmed By:            ASSESSMENT/PLAN:     Active Hospital Problems    Diagnosis    *Traumatic hemopneumothorax, initial encounter    Atrial fibrillation    HLD (hyperlipidemia)    Lumbar compression fracture    Right lower lobe lung mass    Closed fracture of ramus of left pubis    Fall    Troponin level elevated    Multiple closed fractures of ribs of right side    Cardiac pacemaker in situ    Primary hypertension    Acute posthemorrhagic anemia    Contusion of lung, closed, initial encounter    Compression fracture of body of thoracic vertebra    Hyponatremia       ASSESSMENT & PLAN:     S/p mechanical fall  Traumatic hemopneumothorax  PAF  S/p ppm  Acute anemia  Elevated troponin level- minimally  elevated  SVT      RECOMMENDATIONS:    Patient presented to [place] ED s/p fall w/ acute R rib fractures 8 - 9 & traumatic hemopneumothorax. Chest tube was placed. Pneumothorax has since resolved and CT removed today.    Mildly elevated troponin HS and downtrending.    Ischemic changes noted on previous EKGs this admission. Improved.  Patient may benefit from stress testing/angiogram when stable.     3. History of paroxysmal atrial fibrillation currently off anticoagulation due to acute anemia and recent hemopneumothorax. Continue diltiazem 120 mg daily.      4. Brief episode SVT this AM. Continue metoprolol 12.5 mg BID. Hold for HR < 60 bpm or SBP < 105 mmHg.    5. Potassium 4.6 and Magnesium 1.9 today. Continue to check and replace potassium and magnesium. Goal for potassium is 4.0, and goal for magnesium is 2.0.     6. Encouraged use of Incentive spirometry    Sanna Rausch NP  Department of Cardiology  Date of Service: 07/18/2023        I have personally interviewed and examined the patient, I have reviewed the Nurse Practitioner's history and physical, assessment, and plan. I agree with the findings and plan.      Dr. Dieter PALACIO  Department of Cardiology  Date of Service: 07/18/2023  2:53 PM

## 2023-07-18 NOTE — PROGRESS NOTES
Progress Note  PULMONARY    Admit Date: 7/13/2023 07/18/2023  History of Present Illness:  Pt is an 84 yo female with right side hemopneumothorax after a fall at home. She is a poor historian but does remember she has heart disease. She is on eliquis- new med 2 wks for atrial fib- last dose wed am. She endorses pain on the right side of her back, better s/p pain meds.. She was admitted at [place] yesterday and transferred to [facility] ED for higher level of care. She underwent right side chest tube placement in ED and is being transferred to ICU.    SUBJECTIVE:     7/14- has been stable overnight. Hgb downtrending to 7. pt receiving 1 unit pRBCs. She states pain R back is 4-5/10. No other complaints. No SOB or cough. Chest tube has so far drained 600mL bloody output    7/15 the chest tube continues to drain as much fluid around it as it does through it.  Do not see any residual pneumothorax on today's film.     7/16 the thoracostomy tube continues to drain.  The patient is doing well.  She sat up in the chair for hours.  She ate.  Her pain is controlled.    7/17 The patient is now on the cardiology floor. Her CT put out 220 in the last 24 hours.  The patient is complaining of pain.  She did not get out of bed today because her chest tube and ribs were hurting so badly today.  Explained how important it is for her to get out of bed tomorrow.  Discussed with her nurse that she needs some pain medicine and accurate I's and O's and dressing changes to her site.    7/18 the patient had 90 cc recorded from her thoracostomy tube overnight.  Her chest x-ray shows that the apical and basilar pneumothorax have resolved.  There is a bit more pleural fluid.  On examination of the patient the chest tube was kinked but when opened there was still no drainage of pleural fluid.  The tube was removed without incident.    OBJECTIVE:     Vitals (Most recent):  Vitals:    07/18/23 0834   BP:    Pulse:    Resp: 20   Temp:        Vitals (24  hour range):  Temp:  [97.9 °F (36.6 °C)-99.5 °F (37.5 °C)]   Pulse:  []   Resp:  [18-22]   BP: (130-148)/(68-81)   SpO2:  [91 %-94 %]       Intake/Output Summary (Last 24 hours) at 7/18/2023 1118  Last data filed at 7/18/2023 0842  Gross per 24 hour   Intake 120 ml   Output 640 ml   Net -520 ml          PHYSICAL EXAM  GENERAL: Older patient in no distress.  HEENT: Pupils equal and reactive. Extraocular movements intact. Nose intact.   Pharynx moist.  NECK: Supple.   HEART: Regular rate and rhythm. No murmur or gallop auscultated.  LUNGS:  Crackles posteriorly.  Lung excursion symmetrical. No change in fremitus.  Thoracostomy tube:  Draining serosanguineous, leak mostly serous now  ABDOMEN: Bowel sounds present. Non-tender, no masses palpated.  : Normal anatomy.  EXTREMITIES: Normal muscle tone and joint movement, no cyanosis or clubbing.   LYMPHATICS: No adenopathy palpated, no edema.  SKIN: Dry, intact, no lesions.   NEURO: Cranial nerves II-XII intact. Motor strength 5/5 bilaterally, upper and lower extremities.  PSYCH: Appropriate affect.      Radiographs reviewed: view by direct vision   CXR 7/14- R chest tube in place tip projects toward mediastinum, improved aeration R lower lung. Tiny apical pneumothorax visible   7/15 chest x-ray is unchanged  7/16 the right lower lobe is opening up.  There is no pneumothorax visible.  7/17 there is a tiny apical pneumothorax and a small basilar pneumothorax showing on today's chest x-ray.   7/18 the pneumothorax has resolved.  There is a little more fluid in the bottom of the thorax.  Labs     Recent Labs   Lab 07/18/23 0427   WBC 12.34   HGB 10.0*   HCT 30.9*        Recent Labs   Lab 07/18/23 0427   *   K 4.6   CL 98   CO2 28   BUN 16   CREATININE 0.5   *   CALCIUM 8.6*   MG 1.9   AST 20   ALT 15   ALKPHOS 59   BILITOT 1.2*   PROT 5.7*   ALBUMIN 3.0*   No results for input(s): PH, PCO2, PO2, HCO3 in the last 24 hours.  Microbiology Results  (last 7 days)       ** No results found for the last 168 hours. **            Impression:  Active Hospital Problems    Diagnosis  POA    *Traumatic hemopneumothorax, initial encounter [S27.2XXA]  Yes    Atrial fibrillation [I48.91]  Yes    HLD (hyperlipidemia) [E78.5]  Yes    Lumbar compression fracture [S32.000A]  Yes    Right lower lobe lung mass [R91.8]  Yes    Closed fracture of ramus of left pubis [S32.592A]  Yes    Fall [W19.XXXA]  Yes    Troponin level elevated [R77.8]  Yes    Multiple closed fractures of ribs of right side [S22.41XA]  Yes    Cardiac pacemaker in situ [Z95.0]  Yes    Primary hypertension [I10]  Yes    Acute posthemorrhagic anemia [D62]  Yes    Contusion of lung, closed, initial encounter [S27.329A]  Yes    Compression fracture of body of thoracic vertebra [S22.000A]  Yes    Hyponatremia [E87.1]  Yes      Resolved Hospital Problems   No resolved problems to display.         Traumatic hemopneumothorax  - thoracostomy tube in place  - serosanguineous drainage, now mostly serous  - at least as much fluid coming around the thoracostomy tube as through it  - no bronchopleural fistula  - 90cc through the tube in the last 24 hours  - apical and basilar components of pneumothorax seen today, these were not visible yesterday  - chest x-ray in a.m.  Anemia  - a little lower  - SCDs instead of enoxaparin  Thrombocytopenia  - resolved  Right lower lobe contusion  Multiple rib fractures  - patient having pain controlled with hydrocodone    Thoracostomy tube removed.  When patient is ambulatory she may discharge home.

## 2023-07-18 NOTE — SUBJECTIVE & OBJECTIVE
Interval History: No acute events overnight. She continues to have output from chest tube. Pending SNF placement. Some pain today, but otherwise feeling well    Review of Systems   All other systems reviewed and are negative.  Objective:     Vital Signs (Most Recent):  Temp: 98.9 °F (37.2 °C) (07/18/23 1316)  Pulse: 108 (07/18/23 1316)  Resp: 20 (07/18/23 1410)  BP: 126/64 (07/18/23 1316)  SpO2: (!) 94 % (07/18/23 1316) Vital Signs (24h Range):  Temp:  [97.9 °F (36.6 °C)-99.5 °F (37.5 °C)] 98.9 °F (37.2 °C)  Pulse:  [] 108  Resp:  [18-22] 20  SpO2:  [91 %-94 %] 94 %  BP: (126-148)/(64-81) 126/64     Weight: 47.6 kg (104 lb 15 oz)  Body mass index is 18.01 kg/m².    Intake/Output Summary (Last 24 hours) at 7/18/2023 1432  Last data filed at 7/18/2023 1225  Gross per 24 hour   Intake 360 ml   Output 590 ml   Net -230 ml         Physical Exam  Constitutional:       Appearance: Normal appearance.      Comments: frail   HENT:      Head: Normocephalic and atraumatic.      Nose: Nose normal.      Mouth/Throat:      Mouth: Mucous membranes are moist.   Eyes:      Conjunctiva/sclera: Conjunctivae normal.      Pupils: Pupils are equal, round, and reactive to light.   Cardiovascular:      Rate and Rhythm: Normal rate and regular rhythm.      Pulses: Normal pulses.      Heart sounds: Normal heart sounds. No murmur heard.    No friction rub. No gallop.   Pulmonary:      Effort: Pulmonary effort is normal.      Breath sounds: Normal breath sounds. No wheezing or rales.      Comments: Right sided chest tube in place  Abdominal:      General: Abdomen is flat. Bowel sounds are normal. There is no distension.      Palpations: Abdomen is soft.      Tenderness: There is no abdominal tenderness. There is no guarding.   Musculoskeletal:         General: No swelling. Normal range of motion.      Cervical back: Normal range of motion and neck supple.   Skin:     General: Skin is warm and dry.   Neurological:      General: No focal  deficit present.      Mental Status: She is alert.   Psychiatric:         Mood and Affect: Mood normal.         Thought Content: Thought content normal.         Judgment: Judgment normal.           Significant Labs: All pertinent labs within the past 24 hours have been reviewed.    Significant Imaging: I have reviewed all pertinent imaging results/findings within the past 24 hours.

## 2023-07-18 NOTE — PLAN OF CARE
SW met with Pt at bedside to inform her that PT/OT recommended SNF. Pt asked SW to inform her granddaughter as well. SW spoke with Pt's granddaughter, Abbi, and informed her about SNF recommendation. SW will continue to follow.

## 2023-07-18 NOTE — CARE UPDATE
07/18/23 0741   Patient Assessment/Suction   Level of Consciousness (AVPU) alert   All Lung Fields Breath Sounds diminished   PRE-TX-O2   Device (Oxygen Therapy) room air   SpO2 (!) 94 %   Pulse Oximetry Type Intermittent   $ Pulse Oximetry - Multiple Charge Pulse Oximetry - Multiple   Pulse 109   Resp (!) 22   Incentive Spirometer   $ Incentive Spirometer Charges done with encouragement   Incentive Spirometer Predicted Level (mL) 750   Administration (IS) instruction provided, follow-up   Number of Repetitions (IS) 10   Level Incentive Spirometer (mL) 500   Patient Tolerance (IS) fair

## 2023-07-18 NOTE — PLAN OF CARE
Patient up to bedside commode with assist X1. Ambulated in rob with therapy. Family involved in care.      Problem: Adult Inpatient Plan of Care  Goal: Plan of Care Review  Outcome: Ongoing, Progressing  Flowsheets (Taken 7/18/2023 1849)  Plan of Care Reviewed With: patient  Goal: Optimal Comfort and Wellbeing  Outcome: Ongoing, Progressing  Intervention: Provide Person-Centered Care  Flowsheets (Taken 7/18/2023 1849)  Trust Relationship/Rapport:   care explained   choices provided   thoughts/feelings acknowledged   reassurance provided     Problem: Fall Injury Risk  Goal: Absence of Fall and Fall-Related Injury  Outcome: Ongoing, Progressing  Intervention: Promote Injury-Free Environment  Flowsheets (Taken 7/18/2023 1849)  Safety Promotion/Fall Prevention:   assistive device/personal item within reach   bed alarm set

## 2023-07-18 NOTE — PLAN OF CARE
Problem: Physical Therapy  Goal: Physical Therapy Goal  Description: Goals to be met by: 8/15/2023     Patient will increase functional independence with mobility by performin. Supine to sit with Stand-by Assistance  2. Sit to stand transfer with Stand-by Assistance  3. Gait  x 150 feet with Stand-by Assistance using Rolling Walker.     Outcome: Ongoing, Progressing

## 2023-07-19 LAB
ALBUMIN SERPL BCP-MCNC: 2.6 G/DL (ref 3.5–5.2)
ALP SERPL-CCNC: 50 U/L (ref 55–135)
ALT SERPL W/O P-5'-P-CCNC: 14 U/L (ref 10–44)
ANION GAP SERPL CALC-SCNC: 4 MMOL/L (ref 8–16)
AST SERPL-CCNC: 16 U/L (ref 10–40)
BASOPHILS # BLD AUTO: 0.03 K/UL (ref 0–0.2)
BASOPHILS NFR BLD: 0.3 % (ref 0–1.9)
BILIRUB SERPL-MCNC: 0.9 MG/DL (ref 0.1–1)
BUN SERPL-MCNC: 15 MG/DL (ref 8–23)
CALCIUM SERPL-MCNC: 8.3 MG/DL (ref 8.7–10.5)
CHLORIDE SERPL-SCNC: 99 MMOL/L (ref 95–110)
CO2 SERPL-SCNC: 30 MMOL/L (ref 23–29)
CREAT SERPL-MCNC: 0.4 MG/DL (ref 0.5–1.4)
DIFFERENTIAL METHOD: ABNORMAL
EOSINOPHIL # BLD AUTO: 0.5 K/UL (ref 0–0.5)
EOSINOPHIL NFR BLD: 5.4 % (ref 0–8)
ERYTHROCYTE [DISTWIDTH] IN BLOOD BY AUTOMATED COUNT: 13 % (ref 11.5–14.5)
EST. GFR  (NO RACE VARIABLE): >60 ML/MIN/1.73 M^2
GLUCOSE SERPL-MCNC: 107 MG/DL (ref 70–110)
HCT VFR BLD AUTO: 25.1 % (ref 37–48.5)
HGB BLD-MCNC: 8.5 G/DL (ref 12–16)
IMM GRANULOCYTES # BLD AUTO: 0.04 K/UL (ref 0–0.04)
IMM GRANULOCYTES NFR BLD AUTO: 0.4 % (ref 0–0.5)
LYMPHOCYTES # BLD AUTO: 1.2 K/UL (ref 1–4.8)
LYMPHOCYTES NFR BLD: 13 % (ref 18–48)
MAGNESIUM SERPL-MCNC: 2 MG/DL (ref 1.6–2.6)
MCH RBC QN AUTO: 32.1 PG (ref 27–31)
MCHC RBC AUTO-ENTMCNC: 33.9 G/DL (ref 32–36)
MCV RBC AUTO: 95 FL (ref 82–98)
MONOCYTES # BLD AUTO: 1.1 K/UL (ref 0.3–1)
MONOCYTES NFR BLD: 12 % (ref 4–15)
NEUTROPHILS # BLD AUTO: 6.1 K/UL (ref 1.8–7.7)
NEUTROPHILS NFR BLD: 68.9 % (ref 38–73)
NRBC BLD-RTO: 0 /100 WBC
PLATELET # BLD AUTO: 250 K/UL (ref 150–450)
PMV BLD AUTO: 11.7 FL (ref 9.2–12.9)
POTASSIUM SERPL-SCNC: 4.2 MMOL/L (ref 3.5–5.1)
PROT SERPL-MCNC: 5 G/DL (ref 6–8.4)
RBC # BLD AUTO: 2.65 M/UL (ref 4–5.4)
SODIUM SERPL-SCNC: 133 MMOL/L (ref 136–145)
WBC # BLD AUTO: 8.89 K/UL (ref 3.9–12.7)

## 2023-07-19 PROCEDURE — 99232 SBSQ HOSP IP/OBS MODERATE 35: CPT | Mod: ,,, | Performed by: INTERNAL MEDICINE

## 2023-07-19 PROCEDURE — 25000003 PHARM REV CODE 250: Performed by: STUDENT IN AN ORGANIZED HEALTH CARE EDUCATION/TRAINING PROGRAM

## 2023-07-19 PROCEDURE — 80053 COMPREHEN METABOLIC PANEL: CPT | Performed by: STUDENT IN AN ORGANIZED HEALTH CARE EDUCATION/TRAINING PROGRAM

## 2023-07-19 PROCEDURE — 99232 PR SUBSEQUENT HOSPITAL CARE,LEVL II: ICD-10-PCS | Mod: ,,, | Performed by: INTERNAL MEDICINE

## 2023-07-19 PROCEDURE — 85025 COMPLETE CBC W/AUTO DIFF WBC: CPT | Performed by: STUDENT IN AN ORGANIZED HEALTH CARE EDUCATION/TRAINING PROGRAM

## 2023-07-19 PROCEDURE — 21400001 HC TELEMETRY ROOM

## 2023-07-19 PROCEDURE — 36415 COLL VENOUS BLD VENIPUNCTURE: CPT | Performed by: STUDENT IN AN ORGANIZED HEALTH CARE EDUCATION/TRAINING PROGRAM

## 2023-07-19 PROCEDURE — 97530 THERAPEUTIC ACTIVITIES: CPT | Mod: CQ

## 2023-07-19 PROCEDURE — 25000003 PHARM REV CODE 250

## 2023-07-19 PROCEDURE — 99900035 HC TECH TIME PER 15 MIN (STAT)

## 2023-07-19 PROCEDURE — 83735 ASSAY OF MAGNESIUM: CPT | Performed by: STUDENT IN AN ORGANIZED HEALTH CARE EDUCATION/TRAINING PROGRAM

## 2023-07-19 PROCEDURE — 27000221 HC OXYGEN, UP TO 24 HOURS

## 2023-07-19 PROCEDURE — 94761 N-INVAS EAR/PLS OXIMETRY MLT: CPT

## 2023-07-19 RX ADMIN — ACETAMINOPHEN 650 MG: 325 TABLET ORAL at 09:07

## 2023-07-19 RX ADMIN — HYDROCODONE BITARTRATE AND ACETAMINOPHEN 1 TABLET: 5; 325 TABLET ORAL at 08:07

## 2023-07-19 RX ADMIN — METOPROLOL TARTRATE 12.5 MG: 25 TABLET, FILM COATED ORAL at 08:07

## 2023-07-19 RX ADMIN — HYDROCODONE BITARTRATE AND ACETAMINOPHEN 1 TABLET: 5; 325 TABLET ORAL at 03:07

## 2023-07-19 RX ADMIN — METOPROLOL TARTRATE 12.5 MG: 25 TABLET, FILM COATED ORAL at 09:07

## 2023-07-19 RX ADMIN — HYDROCODONE BITARTRATE AND ACETAMINOPHEN 1 TABLET: 5; 325 TABLET ORAL at 06:07

## 2023-07-19 RX ADMIN — ATORVASTATIN CALCIUM 10 MG: 10 TABLET, FILM COATED ORAL at 08:07

## 2023-07-19 RX ADMIN — TRAZODONE HYDROCHLORIDE 50 MG: 50 TABLET ORAL at 09:07

## 2023-07-19 RX ADMIN — HYDROCODONE BITARTRATE AND ACETAMINOPHEN 1 TABLET: 5; 325 TABLET ORAL at 02:07

## 2023-07-19 RX ADMIN — DILTIAZEM HYDROCHLORIDE 120 MG: 120 CAPSULE, COATED, EXTENDED RELEASE ORAL at 08:07

## 2023-07-19 NOTE — CARE UPDATE
07/19/23 0914   PRE-TX-O2   Device (Oxygen Therapy) nasal cannula   $ Is the patient on Low Flow Oxygen? Yes   Flow (L/min) 1   SpO2 96 %   Pulse Oximetry Type Intermittent   $ Pulse Oximetry - Multiple Charge Pulse Oximetry - Multiple   Pulse 101   Resp 15   Respiratory Evaluation   $ Care Plan Tech Time 15 min

## 2023-07-19 NOTE — SUBJECTIVE & OBJECTIVE
Interval History: chest tube out, feeling well. Pending placement    Review of Systems   All other systems reviewed and are negative.  Objective:     Vital Signs (Most Recent):  Temp: 98.1 °F (36.7 °C) (07/19/23 1100)  Pulse: 91 (07/19/23 1100)  Resp: 20 (07/19/23 1100)  BP: 116/80 (07/19/23 1100)  SpO2: (!) 91 % (07/19/23 1100) Vital Signs (24h Range):  Temp:  [97.8 °F (36.6 °C)-98.5 °F (36.9 °C)] 98.1 °F (36.7 °C)  Pulse:  [] 91  Resp:  [15-20] 20  SpO2:  [91 %-99 %] 91 %  BP: (101-142)/(52-80) 116/80     Weight: 46.2 kg (101 lb 13.6 oz)  Body mass index is 17.48 kg/m².    Intake/Output Summary (Last 24 hours) at 7/19/2023 1411  Last data filed at 7/19/2023 0511  Gross per 24 hour   Intake 320 ml   Output --   Net 320 ml           Physical Exam  Constitutional:       Appearance: Normal appearance.      Comments: frail   HENT:      Head: Normocephalic and atraumatic.      Nose: Nose normal.      Mouth/Throat:      Mouth: Mucous membranes are moist.   Eyes:      Conjunctiva/sclera: Conjunctivae normal.      Pupils: Pupils are equal, round, and reactive to light.   Cardiovascular:      Rate and Rhythm: Normal rate and regular rhythm.      Pulses: Normal pulses.      Heart sounds: Normal heart sounds. No murmur heard.    No friction rub. No gallop.   Pulmonary:      Effort: Pulmonary effort is normal.      Breath sounds: Normal breath sounds. No wheezing or rales.      Comments: Chest tube removed  Abdominal:      General: Abdomen is flat. Bowel sounds are normal. There is no distension.      Palpations: Abdomen is soft.      Tenderness: There is no abdominal tenderness. There is no guarding.   Musculoskeletal:         General: No swelling. Normal range of motion.      Cervical back: Normal range of motion and neck supple.   Skin:     General: Skin is warm and dry.   Neurological:      General: No focal deficit present.      Mental Status: She is alert.   Psychiatric:         Mood and Affect: Mood normal.          Thought Content: Thought content normal.         Judgment: Judgment normal.           Significant Labs: All pertinent labs within the past 24 hours have been reviewed.    Significant Imaging: I have reviewed all pertinent imaging results/findings within the past 24 hours.

## 2023-07-19 NOTE — PLAN OF CARE
ALFONSO spoke with Pt, her granddaughter, Abbi, and her grandson, Jhoan about SNF recommendation. Pt and family in agreement for Pt to DC to SNF. Per Yarely at [facility], there should be available beds by Friday.     At grandson's request, SW spoke with him at bedside to update him on her DC plan. Pt's grandson informed that Pt accepted to [facility] pending bed availability. Pt's grandson asked SW to follow-up with [facility] because it is closer to home for him, however he is okay with her discharging to [facility] if [facility] cannot accept. ALFONSO will follow-up with Pt and family with new updates.     ALFONSO contacted [facility]. Per Sydney, there was no referral for Pt in [facility]. ALFONSO sent referral again and will follow-up with facility.       07/19/23 150   Discharge Reassessment   Assessment Type Discharge Planning Reassessment   Did the patient's condition or plan change since previous assessment? No   Discharge Plan discussed with: Patient   Communicated QUIQUE with patient/caregiver Date not available/Unable to determine   Discharge Plan A Skilled Nursing Facility   Discharge Plan B Skilled Nursing Facility   DME Needed Upon Discharge  none   Transition of Care Barriers None   Why the patient remains in the hospital Requires continued medical care   Post-Acute Status   Post-Acute Authorization Placement   Post-Acute Placement Status Pending Bed Availability   Discharge Delays None known at this time

## 2023-07-19 NOTE — PLAN OF CARE
Patient up and in chair for a few hours today. Family at bedside. PRN pain medication administered as needed.      Problem: Adult Inpatient Plan of Care  Goal: Plan of Care Review  Outcome: Ongoing, Progressing  Flowsheets (Taken 7/19/2023 1807)  Plan of Care Reviewed With: patient  Goal: Optimal Comfort and Wellbeing  Outcome: Ongoing, Progressing  Intervention: Provide Person-Centered Care  Flowsheets (Taken 7/19/2023 1807)  Trust Relationship/Rapport:   care explained   choices provided   thoughts/feelings acknowledged   reassurance provided     Problem: Fall Injury Risk  Goal: Absence of Fall and Fall-Related Injury  Outcome: Ongoing, Progressing  Intervention: Promote Injury-Free Environment  Flowsheets (Taken 7/19/2023 1807)  Safety Promotion/Fall Prevention:   assistive device/personal item within reach   bed alarm set

## 2023-07-19 NOTE — PROGRESS NOTES
[facility]  Adult Nutrition   Progress Note (Follow-Up)    SUMMARY      Recommendations:   1. Continue current cardiac diet with ensure HP with meals.  2.  to aid in meal selection daily.     Goals:   Pt to continue to meet 100% of her EEN/EPN.    Dietitian Rounds Brief  F/U Nutrition Note: Pt is eating about 50% of her cardiac diet and her LBM was yesterday. Will continue to follow prn.    Diet order: Cardiac    Oral Supplement: Ensure HP with meals    % Intake of Estimated Energy Needs: 50%  % Meal Intake: 50 - 75 %    Estimated/Assessed Needs  Weight Used For Calorie Calculations: 47.6 kg (104 lb 15 oz)  Energy Calorie Requirements (kcal): 0465-2991 kcals/day (30-35 kcals/kg ABW)  Energy Need Method: Kcal/kg  Protein Requirements: 66-83 g/day (1.2-1.5 g/kg IBW)  Weight Used For Protein Calculations: 55 kg (121 lb 4.1 oz)     Estimated Fluid Requirement Method: RDA Method  RDA Method (mL): 1428       Weight History:  Wt Readings from Last 5 Encounters:   07/19/23 46.2 kg (101 lb 13.6 oz)   07/15/23 47.2 kg (104 lb)   07/12/23 46.7 kg (103 lb)   06/22/23 52.2 kg (114 lb 15.9 oz)   11/11/22 46.3 kg (102 lb)        Reason for Assessment  Reason For Assessment: length of stay  Diagnosis: other (see comments) (Traumatic hemopneumothorax, initial encounter)  Relevant Medical History: Hypertension, Osteoporosis, Depression, Anxiety, Seasonal allergies, Chronic neck pain, TIA (transient ischemic attack)  Interdisciplinary Rounds: did not attend    Medications:Pertinent Medications Reviewed  Scheduled Meds:   atorvastatin  10 mg Oral Daily    diltiaZEM  120 mg Oral Daily    metoprolol tartrate  12.5 mg Oral BID    traZODone  50 mg Oral QHS     Continuous Infusions:  PRN Meds:.acetaminophen, baclofen, HYDROcodone-acetaminophen, morphine, naloxone, ondansetron, prochlorperazine, sodium chloride 0.9%    Labs: Pertinent Labs Reviewed  Clinical Chemistry:  Recent Labs   Lab 07/13/23  0458 07/13/23  0841  07/19/23  0413   *   < > 133*   K 5.1   < > 4.2   CL 97   < > 99   CO2 24   < > 30*      < > 107   BUN 24*   < > 15   CREATININE 0.9   < > 0.4*   CALCIUM 8.7   < > 8.3*   PROT  --    < > 5.0*   ALBUMIN  --    < > 2.6*   BILITOT  --    < > 0.9   ALKPHOS  --    < > 50*   AST  --    < > 16   ALT  --    < > 14   ANIONGAP 9   < > 4*   MG 1.7   < > 2.0   PHOS 4.3  --   --     < > = values in this interval not displayed.     CBC:   Recent Labs   Lab 07/19/23 0413   WBC 8.89   RBC 2.65*   HGB 8.5*   HCT 25.1*      MCV 95   MCH 32.1*   MCHC 33.9       Recent Labs   Lab 07/13/23  0158 07/13/23  0833 07/13/23  1128   *  --   --    TROPONINI  --  0.037* 0.013       Monitor and Evaluation  Food and Nutrient Intake: food and beverage intake, energy intake  Food and Nutrient Adminstration: diet order  Knowledge/Beliefs/Attitudes: food and nutrition knowledge/skill, beliefs and attitudes  Physical Activity and Function: nutrition-related ADLs and IADLs, factors affecting access to physical activity  Anthropometric Measurements: weight, weight change, body mass index  Biochemical Data, Medical Tests and Procedures: lipid profile, inflammatory profile, glucose/endocrine profile, gastrointestinal profile, electrolyte and renal panel  Nutrition-Focused Physical Findings: overall appearance     Nutrition Risk  Level of Risk/Frequency of Follow-up: moderate     Nutrition Follow-Up  RD Follow-up?: Yes    Janette Moraes, MEEK 07/19/2023 12:16 PM

## 2023-07-19 NOTE — PROGRESS NOTES
[facility] Medicine  Progress Note    Patient Name: Alvina Guallpa  MRN: [id number]  Patient Class: IP- Inpatient   Admission Date: 7/13/2023  Length of Stay: 6 days  Attending Physician: Armaan Sahu MD  Primary Care Provider: EDY Hurd        Subjective:     Principal Problem:Traumatic hemopneumothorax, initial encounter        HPI:  Patient is an 83-year-old female with a history of AFib, hypertension, hyperlipidemia, scoliosis with multiple chronic spinal degenerative changes with chronic back pain, history of pelvic fracture and hip fracture in the past who presents after tripping and falling at home.  She was recently replaced on Eliquis by her cardiologist due to episodes of AFib.  She tripped and fell at home onto her right side and developed severe pain in her side.  She went to the ER at [place] and was evaluated and found to have rib fractures at level 8 and 9 in addition to what appeared to be multiple chronic spinal compression fractures on CT imaging.  CT of the chest reveals hemopneumothorax with small apical pneumothorax.  She was admitted there and monitored with repeat CT scan today showing enlarging hemopneumothorax.    Additionally, she was noted to have EKG changes with mildly elevated troponin though her troponin level trended down.  She is not having any chest pain.  She had an episode of hypotension that improved with IV fluids.    She was transferred per  transfer to [facility] for further evaluation.  EN route, per Pulmonary request the patient was redirected to the ER for further evaluation and chest tube placement.    On arrival in the ED:  The patient's vital signs are stable.  She is mildly tachycardic with heart rate in the low 100s.  She is comfortable and easily communicating.  She says she has back pain that is similar to her chronic back pain.  She has mild right rib pain.  Her pelvis is without pain at this time.  She has no  other complaints.  She is breathing easily.  I discussed with the ED provider and Dr. Baker has graciously assisted with placement of chest tube.  Additionally, Dr Baker discussed with Dr Weiss who will consult for any potential need of surgical involvement and will assist with management of the chest tube.  I discussed with Dr. Carrasco and with Dr. Dhaliwal from the Pulmonary team regarding further management.  The patient will be moved to the ICU and will complete trauma workup with scans once chest tube is placed.    See referring provider note below.    83-year-old woman with PMH HTN, TIA (on Eliquis), pacemaker, and osteoporosis adm to  on 07/12 after falling and hitting her right side on her wheelchair.  Patient said that she tripped.  There was no LOC and she did not hit her head.  ROS pos for right-sided chest wall pain.       On admission /93, HR 95.  Exam was pos for bruising on the right chest and arms and impaired recent memory.  Labs:  WBC 13.1, Hb 11.7, Na 131, K 4.9, Cr 1.2.      CT chest WO contrast pos for acute fractures of right ribs 8 -9 with small pneumothorax and extensive subcutaneous emphysema.  Small right hemothorax, right lower lung contusion/hematoma.  Also,, extensive compression deformities throughout the thoracic spine of uncertain chronicity.      EKG NSR notable for small T-wave inversion V2-4 which were not present on an earlier EKG (03/2020).      Patient became hypotensive this morning on her way to radiology for a repeat CT chest.  BP improved when patient placed in Trendelenburg and given IVF.      Repeat ECG  (829 h) pos for deep T-wave inversion in V2-6 which was not present on the earlier ECG. Troponin 0.037 > 0.013.      CT chest today pos for interval increase in right pleural effusion/ hemothorax with no significant change in small pneumothorax.  Also, increasing atelectasis of the right lower lobe obscuring the previously described hematoma/ contusion.   Also, mild atelectasis on the left.       Patient complained of left hip pain this morning.  X-ray pos for left superior pubic ramus fracture of uncertain chronicity possibly acute.  also,, old intertrochanteric left hip fracture s/p ORIF.  Severe bony demineralization and degenerative changes noted.     VS (12 18 h) /62, , RR 15, SpO2 100% (2 L).  Labs WBC 13.1 > 10.2, Hb 11.7 > 9.7, Na 131> 132, Cr 1.2 > 0.8     Transfer requested for higher level of care.  Transfer diagnosis multiple right-sided rib fractures, right pulmonary contusion, right apical pneumothorax (small), rt > lt pleural effusion, left pubic ramus fracture of uncertain chronicity, EKG changes concerning for ischemia      Overview/Hospital Course:  Alvina Guallpa is an 83 year old female with a past medical history of Afib on Xarelto, cardiac pacemaker, HTN, anemia, HLD, and osteopenia with chronic thoracic, lumbar, left femur and pelvic fractures who presented with a mechanical fall leading to right sided 8th and 9th rib fractures with a hemopneumothorax. She had a chest tube placed in the ED upon transfer from [place]. Her Hgb fell to 7 as there was significant output from the chest tube once placed. She received one unit of FFP and one unit of PRBCs. Home Xarelto is currently held. Hgb is being trended and has improved; output from the chest tube is also decreasing. Pulmonary is following. She is stable on room air currently. Daily serial CXRs show progressive improvement. Troponin is also being trended given mild elevation with TWIs at the lateral leads. Cardiology has been consulted and is considering stress testing and/or angiogram. Transfer orders to leave the ICU were placed 7/16. She is pending SNF placement once medically appropriate.      Interval History: chest tube out, feeling well. Pending placement    Review of Systems   All other systems reviewed and are negative.  Objective:     Vital Signs (Most Recent):  Temp: 98.1 °F  (36.7 °C) (07/19/23 1100)  Pulse: 91 (07/19/23 1100)  Resp: 20 (07/19/23 1100)  BP: 116/80 (07/19/23 1100)  SpO2: (!) 91 % (07/19/23 1100) Vital Signs (24h Range):  Temp:  [97.8 °F (36.6 °C)-98.5 °F (36.9 °C)] 98.1 °F (36.7 °C)  Pulse:  [] 91  Resp:  [15-20] 20  SpO2:  [91 %-99 %] 91 %  BP: (101-142)/(52-80) 116/80     Weight: 46.2 kg (101 lb 13.6 oz)  Body mass index is 17.48 kg/m².    Intake/Output Summary (Last 24 hours) at 7/19/2023 1411  Last data filed at 7/19/2023 0511  Gross per 24 hour   Intake 320 ml   Output --   Net 320 ml           Physical Exam  Constitutional:       Appearance: Normal appearance.      Comments: frail   HENT:      Head: Normocephalic and atraumatic.      Nose: Nose normal.      Mouth/Throat:      Mouth: Mucous membranes are moist.   Eyes:      Conjunctiva/sclera: Conjunctivae normal.      Pupils: Pupils are equal, round, and reactive to light.   Cardiovascular:      Rate and Rhythm: Normal rate and regular rhythm.      Pulses: Normal pulses.      Heart sounds: Normal heart sounds. No murmur heard.    No friction rub. No gallop.   Pulmonary:      Effort: Pulmonary effort is normal.      Breath sounds: Normal breath sounds. No wheezing or rales.      Comments: Chest tube removed  Abdominal:      General: Abdomen is flat. Bowel sounds are normal. There is no distension.      Palpations: Abdomen is soft.      Tenderness: There is no abdominal tenderness. There is no guarding.   Musculoskeletal:         General: No swelling. Normal range of motion.      Cervical back: Normal range of motion and neck supple.   Skin:     General: Skin is warm and dry.   Neurological:      General: No focal deficit present.      Mental Status: She is alert.   Psychiatric:         Mood and Affect: Mood normal.         Thought Content: Thought content normal.         Judgment: Judgment normal.           Significant Labs: All pertinent labs within the past 24 hours have been reviewed.    Significant  Imaging: I have reviewed all pertinent imaging results/findings within the past 24 hours.      Assessment/Plan:      * Traumatic hemopneumothorax, initial encounter  Improving.  -Chest tube placed by Dr. Baker in the ED now removed  -Hold home anticoagulation  -Trend CBC  -Pulmonary consulted  -Serial CXRs      Right lower lobe lung mass  -Continue to monitor  -Pulmonary consulted  -Dedicated imaging once hemopneumothorax resolves      Lumbar compression fracture  Chronic.  -PT/OT  -PRN analgesics      HLD (hyperlipidemia)  -Continue statin      Atrial fibrillation  -Telemetry  -Hold anticoagulation  -Continue diltiazem    Troponin level elevated  Mildly elevated. Stable. EKG shows TWIs at lateral leads. TTE unremarkable.  -Continue telemetry  -Cardiology consultation    Fall  -Fall precautions  -PT/OT  -CM consulted for SNF placement    Closed fracture of ramus of left pubis  Chronic.  -PT/OT when able  - pending SNF placement      Compression fracture of body of thoracic vertebra  Chronic.  -PRN analgesics  -PT/OT when able      Contusion of lung, closed, initial encounter  Secondary to fall.  -PRN analgesics      Hyponatremia  Mild. In setting of trauma.  -Continue to trend Na      Acute posthemorrhagic anemia  Improving.  -S/p FFP and one unit PRBCs  -Hold anticoagulation  -Trend CBC > downtrending  - monitor closely      Primary hypertension  -Hold home BP medications with exception of home diltiazem  -Continue to monitor      Cardiac pacemaker in situ  History of SSS. Stable.      Multiple closed fractures of ribs of right side  8th and 9th rib fracture. Stable.  -PRN analgesics        VTE Risk Mitigation (From admission, onward)         Ordered     IP VTE HIGH RISK PATIENT  Once         07/13/23 1650     Place sequential compression device  Until discontinued         07/13/23 1650                Discharge Planning   QUIQUE: 7/21/2023     Code Status: Full Code   Is the patient medically ready for discharge?:      Reason for patient still in hospital (select all that apply): Treatment  Discharge Plan A: Skilled Nursing Facility                  Armaan Sahu MD  Department of Hospital Medicine   [facility]

## 2023-07-19 NOTE — PROGRESS NOTES
Progress Note  PULMONARY    Admit Date: 7/13/2023 07/19/2023  History of Present Illness:  Pt is an 82 yo female with right side hemopneumothorax after a fall at home. She is a poor historian but does remember she has heart disease. She is on eliquis- new med 2 wks for atrial fib- last dose wed am. She endorses pain on the right side of her back, better s/p pain meds.. She was admitted at [facility] yesterday and transferred to [facility] ED for higher level of care. She underwent right side chest tube placement in ED and is being transferred to ICU.    SUBJECTIVE:     7/14- has been stable overnight. Hgb downtrending to 7. pt receiving 1 unit pRBCs. She states pain R back is 4-5/10. No other complaints. No SOB or cough. Chest tube has so far drained 600mL bloody output    7/15 the chest tube continues to drain as much fluid around it as it does through it.  Do not see any residual pneumothorax on today's film.     7/16 the thoracostomy tube continues to drain.  The patient is doing well.  She sat up in the chair for hours.  She ate.  Her pain is controlled.    7/17 The patient is now on the cardiology floor. Her CT put out 220 in the last 24 hours.  The patient is complaining of pain.  She did not get out of bed today because her chest tube and ribs were hurting so badly today.  Explained how important it is for her to get out of bed tomorrow.  Discussed with her nurse that she needs some pain medicine and accurate I's and O's and dressing changes to her site.    7/18 the patient had 90 cc recorded from her thoracostomy tube overnight.  Her chest x-ray shows that the apical and basilar pneumothorax have resolved.  There is a bit more pleural fluid.  On examination of the patient the chest tube was kinked but when opened there was still no drainage of pleural fluid.  The tube was removed without incident.    7/19 The patient is in the chair.  She is tired.  She had a CXR which shows a slightly larger effusion.  She had a  chest x-ray    OBJECTIVE:     Vitals (Most recent):  Vitals:    07/19/23 1100   BP: 116/80   Pulse: 91   Resp: 20   Temp: 98.1 °F (36.7 °C)       Vitals (24 hour range):  Temp:  [97.8 °F (36.6 °C)-98.5 °F (36.9 °C)]   Pulse:  []   Resp:  [15-20]   BP: (101-142)/(52-80)   SpO2:  [91 %-99 %]       Intake/Output Summary (Last 24 hours) at 7/19/2023 1410  Last data filed at 7/19/2023 0511  Gross per 24 hour   Intake 320 ml   Output --   Net 320 ml          PHYSICAL EXAM  GENERAL: Older patient in no distress.  HEENT: Pupils equal and reactive. Extraocular movements intact. Nose intact.   Pharynx moist.  NECK: Supple.   HEART: Regular rate and rhythm. No murmur or gallop auscultated.  LUNGS:  Crackles posteriorly on the R.  Lung excursion symmetrical. No change in fremitus.  ABDOMEN: Bowel sounds present. Non-tender, no masses palpated.  : Normal anatomy.  EXTREMITIES: Normal muscle tone and joint movement, no cyanosis or clubbing.   LYMPHATICS: No adenopathy palpated, no edema.  SKIN: Dry, intact, no lesions.   NEURO: Cranial nerves II-XII intact. Motor strength 5/5 bilaterally, upper and lower extremities.  PSYCH: Appropriate affect.      Radiographs reviewed: view by direct vision   CXR 7/14- R chest tube in place tip projects toward mediastinum, improved aeration R lower lung. Tiny apical pneumothorax visible   7/15 chest x-ray is unchanged  7/16 the right lower lobe is opening up.  There is no pneumothorax visible.  7/17 there is a tiny apical pneumothorax and a small basilar pneumothorax showing on today's chest x-ray.   7/18 the pneumothorax has resolved.  There is a little more fluid in the bottom of the thorax.  Labs     Recent Labs   Lab 07/19/23  0413   WBC 8.89   HGB 8.5*   HCT 25.1*        Recent Labs   Lab 07/19/23  0413   *   K 4.2   CL 99   CO2 30*   BUN 15   CREATININE 0.4*      CALCIUM 8.3*   MG 2.0   AST 16   ALT 14   ALKPHOS 50*   BILITOT 0.9   PROT 5.0*   ALBUMIN 2.6*   No  results for input(s): PH, PCO2, PO2, HCO3 in the last 24 hours.  Microbiology Results (last 7 days)       ** No results found for the last 168 hours. **            Impression:  Active Hospital Problems    Diagnosis  POA    *Traumatic hemopneumothorax, initial encounter [S27.2XXA]  Yes    Atrial fibrillation [I48.91]  Yes    HLD (hyperlipidemia) [E78.5]  Yes    Lumbar compression fracture [S32.000A]  Yes    Right lower lobe lung mass [R91.8]  Yes    Closed fracture of ramus of left pubis [S32.592A]  Yes    Fall [W19.XXXA]  Yes    Troponin level elevated [R77.8]  Yes    Multiple closed fractures of ribs of right side [S22.41XA]  Yes    Cardiac pacemaker in situ [Z95.0]  Yes    Primary hypertension [I10]  Yes    Acute posthemorrhagic anemia [D62]  Yes    Contusion of lung, closed, initial encounter [S27.329A]  Yes    Compression fracture of body of thoracic vertebra [S22.000A]  Yes    Hyponatremia [E87.1]  Yes      Resolved Hospital Problems   No resolved problems to display.         Traumatic hemopneumothorax  - thoracostomy tube removed  Anemia  - a little lower  - SCDs instead of enoxaparin  Thrombocytopenia  - resolved  Right lower lobe contusion  Multiple rib fractures  - patient having pain controlled with hydrocodone    Patient appears ready to discharge to rehab or skilled as is appropriate.

## 2023-07-19 NOTE — ASSESSMENT & PLAN NOTE
Improving.  -S/p FFP and one unit PRBCs  -Hold anticoagulation  -Trend CBC > downtrending  - monitor closely

## 2023-07-19 NOTE — ASSESSMENT & PLAN NOTE
Improving.  -Chest tube placed by Dr. Baker in the ED now removed  -Hold home anticoagulation  -Trend CBC  -Pulmonary consulted  -Serial CXRs

## 2023-07-19 NOTE — PT/OT/SLP PROGRESS
Physical Therapy Treatment    Patient Name:  Alvina Guallpa   MRN:  [id number]    Recommendations:     Discharge Recommendations: nursing facility, skilled  Discharge Equipment Recommendations: to be determined by next level of care  Barriers to discharge:  pain, decreased mobility from baseline    Assessment:     Alvina Guallpa is a 83 y.o. female admitted with a medical diagnosis of Traumatic hemopneumothorax, initial encounter.  She presents with the following impairments/functional limitations: weakness, impaired endurance, impaired functional mobility, pain, impaired cardiopulmonary response to activity.    Pt with HOB elevated, granddaughter present, and lunch tray bedside but untouched. Reported 7/10 pain. Agreeable to tranfser to bedside chair for lunch intake.     SBA for supine to sit to stand. Step transfer to chair x 3' with RW, CGA. Fatigued and mildly SOB but recovered with return to sit.     Chair alarm active. Lunch tray set up.     Rehab Prognosis: Good; patient would benefit from acute skilled PT services to address these deficits and reach maximum level of function.    Recent Surgery: * No surgery found *      Plan:     During this hospitalization, patient to be seen daily to address the identified rehab impairments via gait training, therapeutic activities, therapeutic exercises and progress toward the following goals:    Plan of Care Expires:  08/15/23    Subjective     Chief Complaint: pain  Patient/Family Comments/goals: eat lunch and visit with granddaughter  Pain/Comfort:  Pain Rating 1: 7/10  Location - Side 1: Right  Location - Orientation 1: lateral  Location 1:  (trunk)  Pain Addressed 1: Pre-medicate for activity, Reposition, Distraction  Pain Rating Post-Intervention 1: 7/10      Objective:     Communicated with nurse prior to session.  Patient found HOB elevated with telemetry, bed alarm, peripheral IV upon PT entry to room.     General Precautions: Standard, fall  Orthopedic  Precautions: N/A  Braces: N/A  Respiratory Status: Nasal cannula, flow 1 L/min     Functional Mobility:  Bed Mobility:     Supine to Sit: stand by assistance  Transfers:     Sit to Stand:  contact guard assistance with rolling walker  Bed to Chair: contact guard assistance with  rolling walker  using  Step Transfer  Gait: 3' step transfer to chair, RW, CGA; fatigue after return to sit      AM-PAC 6 CLICK MOBILITY          Treatment & Education:  -Pt education: benefits of [facility] for PO intake; call light/fall prevention/chair alarm; transfer technique    Patient left up in chair with all lines intact, call button in reach, chair alarm on, and nursing staff notified..    GOALS:   Multidisciplinary Problems       Physical Therapy Goals          Problem: Physical Therapy    Goal Priority Disciplines Outcome Goal Variances Interventions   Physical Therapy Goal     PT, PT/OT Ongoing, Progressing     Description: Goals to be met by: 8/15/2023     Patient will increase functional independence with mobility by performin. Supine to sit with Stand-by Assistance  2. Sit to stand transfer with Stand-by Assistance  3. Gait  x 150 feet with Stand-by Assistance using Rolling Walker.                          Time Tracking:     PT Received On: 23  PT Start Time: 1243     PT Stop Time: 1259  PT Total Time (min): 16 min     Billable Minutes: Therapeutic Activity 16    Treatment Type: Treatment  PT/PTA: PTA     Number of PTA visits since last PT visit: 2023

## 2023-07-20 LAB
ALBUMIN SERPL BCP-MCNC: 2.6 G/DL (ref 3.5–5.2)
ALP SERPL-CCNC: 52 U/L (ref 55–135)
ALT SERPL W/O P-5'-P-CCNC: 13 U/L (ref 10–44)
ANION GAP SERPL CALC-SCNC: 5 MMOL/L (ref 8–16)
AST SERPL-CCNC: 16 U/L (ref 10–40)
BASOPHILS # BLD AUTO: 0.03 K/UL (ref 0–0.2)
BASOPHILS NFR BLD: 0.4 % (ref 0–1.9)
BILIRUB SERPL-MCNC: 0.7 MG/DL (ref 0.1–1)
BUN SERPL-MCNC: 12 MG/DL (ref 8–23)
CALCIUM SERPL-MCNC: 8.3 MG/DL (ref 8.7–10.5)
CHLORIDE SERPL-SCNC: 100 MMOL/L (ref 95–110)
CO2 SERPL-SCNC: 29 MMOL/L (ref 23–29)
CREAT SERPL-MCNC: 0.5 MG/DL (ref 0.5–1.4)
DIFFERENTIAL METHOD: ABNORMAL
EOSINOPHIL # BLD AUTO: 0.5 K/UL (ref 0–0.5)
EOSINOPHIL NFR BLD: 6.7 % (ref 0–8)
ERYTHROCYTE [DISTWIDTH] IN BLOOD BY AUTOMATED COUNT: 12.8 % (ref 11.5–14.5)
EST. GFR  (NO RACE VARIABLE): >60 ML/MIN/1.73 M^2
GLUCOSE SERPL-MCNC: 104 MG/DL (ref 70–110)
HCT VFR BLD AUTO: 26.3 % (ref 37–48.5)
HGB BLD-MCNC: 8.5 G/DL (ref 12–16)
IMM GRANULOCYTES # BLD AUTO: 0.03 K/UL (ref 0–0.04)
IMM GRANULOCYTES NFR BLD AUTO: 0.4 % (ref 0–0.5)
LYMPHOCYTES # BLD AUTO: 0.9 K/UL (ref 1–4.8)
LYMPHOCYTES NFR BLD: 11.6 % (ref 18–48)
MAGNESIUM SERPL-MCNC: 1.8 MG/DL (ref 1.6–2.6)
MCH RBC QN AUTO: 30.7 PG (ref 27–31)
MCHC RBC AUTO-ENTMCNC: 32.3 G/DL (ref 32–36)
MCV RBC AUTO: 95 FL (ref 82–98)
MONOCYTES # BLD AUTO: 0.9 K/UL (ref 0.3–1)
MONOCYTES NFR BLD: 11.2 % (ref 4–15)
NEUTROPHILS # BLD AUTO: 5.6 K/UL (ref 1.8–7.7)
NEUTROPHILS NFR BLD: 69.7 % (ref 38–73)
NRBC BLD-RTO: 0 /100 WBC
PLATELET # BLD AUTO: 292 K/UL (ref 150–450)
PMV BLD AUTO: 11.4 FL (ref 9.2–12.9)
POTASSIUM SERPL-SCNC: 3.6 MMOL/L (ref 3.5–5.1)
PROT SERPL-MCNC: 4.7 G/DL (ref 6–8.4)
RBC # BLD AUTO: 2.77 M/UL (ref 4–5.4)
SODIUM SERPL-SCNC: 134 MMOL/L (ref 136–145)
WBC # BLD AUTO: 8.01 K/UL (ref 3.9–12.7)

## 2023-07-20 PROCEDURE — 99232 SBSQ HOSP IP/OBS MODERATE 35: CPT | Mod: ,,, | Performed by: INTERNAL MEDICINE

## 2023-07-20 PROCEDURE — 25000003 PHARM REV CODE 250

## 2023-07-20 PROCEDURE — 99900035 HC TECH TIME PER 15 MIN (STAT)

## 2023-07-20 PROCEDURE — 63600175 PHARM REV CODE 636 W HCPCS: Performed by: STUDENT IN AN ORGANIZED HEALTH CARE EDUCATION/TRAINING PROGRAM

## 2023-07-20 PROCEDURE — 83735 ASSAY OF MAGNESIUM: CPT | Performed by: STUDENT IN AN ORGANIZED HEALTH CARE EDUCATION/TRAINING PROGRAM

## 2023-07-20 PROCEDURE — 21400001 HC TELEMETRY ROOM

## 2023-07-20 PROCEDURE — 80053 COMPREHEN METABOLIC PANEL: CPT | Performed by: STUDENT IN AN ORGANIZED HEALTH CARE EDUCATION/TRAINING PROGRAM

## 2023-07-20 PROCEDURE — 94799 UNLISTED PULMONARY SVC/PX: CPT

## 2023-07-20 PROCEDURE — 99232 PR SUBSEQUENT HOSPITAL CARE,LEVL II: ICD-10-PCS | Mod: ,,, | Performed by: INTERNAL MEDICINE

## 2023-07-20 PROCEDURE — 25000003 PHARM REV CODE 250: Performed by: STUDENT IN AN ORGANIZED HEALTH CARE EDUCATION/TRAINING PROGRAM

## 2023-07-20 PROCEDURE — 36415 COLL VENOUS BLD VENIPUNCTURE: CPT | Performed by: STUDENT IN AN ORGANIZED HEALTH CARE EDUCATION/TRAINING PROGRAM

## 2023-07-20 PROCEDURE — 85025 COMPLETE CBC W/AUTO DIFF WBC: CPT | Performed by: STUDENT IN AN ORGANIZED HEALTH CARE EDUCATION/TRAINING PROGRAM

## 2023-07-20 PROCEDURE — 97530 THERAPEUTIC ACTIVITIES: CPT | Mod: CQ

## 2023-07-20 PROCEDURE — 99900031 HC PATIENT EDUCATION (STAT)

## 2023-07-20 RX ADMIN — METOPROLOL TARTRATE 12.5 MG: 25 TABLET, FILM COATED ORAL at 08:07

## 2023-07-20 RX ADMIN — HYDROCODONE BITARTRATE AND ACETAMINOPHEN 1 TABLET: 5; 325 TABLET ORAL at 09:07

## 2023-07-20 RX ADMIN — MORPHINE SULFATE 2 MG: 2 INJECTION, SOLUTION INTRAMUSCULAR; INTRAVENOUS at 11:07

## 2023-07-20 RX ADMIN — HYDROCODONE BITARTRATE AND ACETAMINOPHEN 1 TABLET: 5; 325 TABLET ORAL at 03:07

## 2023-07-20 RX ADMIN — TRAZODONE HYDROCHLORIDE 50 MG: 50 TABLET ORAL at 08:07

## 2023-07-20 RX ADMIN — HYDROCODONE BITARTRATE AND ACETAMINOPHEN 1 TABLET: 5; 325 TABLET ORAL at 08:07

## 2023-07-20 RX ADMIN — METOPROLOL TARTRATE 12.5 MG: 25 TABLET, FILM COATED ORAL at 09:07

## 2023-07-20 RX ADMIN — DILTIAZEM HYDROCHLORIDE 120 MG: 120 CAPSULE, COATED, EXTENDED RELEASE ORAL at 09:07

## 2023-07-20 RX ADMIN — HYDROCODONE BITARTRATE AND ACETAMINOPHEN 1 TABLET: 5; 325 TABLET ORAL at 04:07

## 2023-07-20 RX ADMIN — ATORVASTATIN CALCIUM 10 MG: 10 TABLET, FILM COATED ORAL at 09:07

## 2023-07-20 NOTE — ASSESSMENT & PLAN NOTE
-Fall precautions  -PT/OT  -CM consulted for SNF placement  - orthostatic today check orthostatic vital signs  - Needs Art hose and possible gentle IV fluids

## 2023-07-20 NOTE — ASSESSMENT & PLAN NOTE
Improving.  -Chest tube placed by Dr. Baker in the ED now removed and site is clean dry and intact  -Hold home anticoagulation  -Trend CBC  -Pulmonary consulted  - follow-up x-rays with resolved pneumothorax.  Small right pleural effusion.

## 2023-07-20 NOTE — ASSESSMENT & PLAN NOTE
Improving.  -S/p FFP and one unit PRBCs  -Hold anticoagulation  -Trend CBC > downtrending but stable today  - monitor closely

## 2023-07-20 NOTE — PLAN OF CARE
ALFONSO called [facility] to follow-up on referral. There was no answer. ALFONSO left message regarding referral and placement for Pt and left a voicemail with call back number.

## 2023-07-20 NOTE — PT/OT/SLP PROGRESS
Physical Therapy Treatment    Patient Name:  Alvina Guallpa   MRN:  [id number]    Recommendations:     Discharge Recommendations: nursing facility, skilled  Discharge Equipment Recommendations: to be determined by next level of care  Barriers to discharge:  ambulation deferred due to lightheaded during therapy with upright; pain; decreased mobility from baseline    Assessment:     Alvina Guallpa is a 83 y.o. female admitted with a medical diagnosis of Traumatic hemopneumothorax, initial encounter.  She presents with the following impairments/functional limitations: weakness, impaired endurance, impaired functional mobility, impaired self care skills, gait instability, decreased ROM, impaired cardiopulmonary response to activity.    Agreeable to therapy but reports fatigue. Upon seated EOB, pt c/o lightheadedness that did not resolve and worsened within 60 seconds requiring return to supine (dizziness exacerbated by pt impulsively standing to adjust gown). Agreed to perform slow positional changes to measure BP response.     +Supine x 5 minutes: 120/71 (85 map) 92HR  +EOB: 115/72 (84 map) 96 and dizzy  +EOB x 4 min's while performing LE ex's: 133/77 (87 map)  93HR and dizziness almost fully resolved  +Standing 112/77 (82 map) 98 HR    Pt performed step transfer x3' with RW, CGA, and with good tolerance. Remained up in chair. Needs at hand. Student nurse present in room for vitals check.     Rehab Prognosis: Good; patient would benefit from acute skilled PT services to address these deficits and reach maximum level of function.    Recent Surgery: * No surgery found *      Plan:     During this hospitalization, patient to be seen daily to address the identified rehab impairments via gait training, therapeutic activities, therapeutic exercises and progress toward the following goals:    Plan of Care Expires:  08/15/23    Subjective     Chief Complaint: significant lightheadedness with initial upright  "  Patient/Family Comments/goals: "I think I weigh the same now as when I was a baby"   Pain/Comfort:  Pain Rating 1: 8/10  Location - Side 1: Bilateral  Location - Orientation 1: posterior  Location 1:  ("neck and along my spine")  Pain Addressed 1: Reposition, Distraction  Pain Rating Post-Intervention 1: 8/10      Objective:     Communicated with nurse prior to session.  Patient found HOB elevated with telemetry, PureWick, bed alarm, peripheral IV upon PT entry to room.     General Precautions: Standard, fall  Orthopedic Precautions: N/A  Braces: N/A  Respiratory Status: Nasal cannula, flow 1 L/min     Functional Mobility:  Bed Mobility:     Supine to Sit: contact guard assistance  Sit to Supine: contact guard assistance  Transfers:     Sit to Stand:  contact guard assistance with rolling walker  Bed to Chair: contact guard assistance with  rolling walker  using  Stand Pivot  Gait: 3' lateral step transfer to chair , RW, CGA, no LOB      AM-PAC 6 CLICK MOBILITY          Treatment & Education:  -Seated BLE therex for improved BP response and reduced dizziness: LAQs, ankle circles, hip flexion 2x10  -Call light/fall prevention; slow transitions to reduce dizziness with upright;     Patient left up in chair with all lines intact, call button in reach, chair alarm on, and nursing student present..    GOALS:   Multidisciplinary Problems       Physical Therapy Goals          Problem: Physical Therapy    Goal Priority Disciplines Outcome Goal Variances Interventions   Physical Therapy Goal     PT, PT/OT Ongoing, Progressing     Description: Goals to be met by: 8/15/2023     Patient will increase functional independence with mobility by performin. Supine to sit with Stand-by Assistance  2. Sit to stand transfer with Stand-by Assistance  3. Gait  x 150 feet with Stand-by Assistance using Rolling Walker.                          Time Tracking:     PT Received On: 23  PT Start Time: 1057     PT Stop Time: " 1129  PT Total Time (min): 32 min     Billable Minutes: Therapeutic Activity 32    Treatment Type: Treatment  PT/PTA: PTA     Number of PTA visits since last PT visit: 2     07/20/2023

## 2023-07-20 NOTE — PROGRESS NOTES
Progress Note  PULMONARY    Admit Date: 7/13/2023 07/20/2023  History of Present Illness:  Pt is an 84 yo female with right side hemopneumothorax after a fall at home. She is a poor historian but does remember she has heart disease. She is on eliquis- new med 2 wks for atrial fib- last dose wed am. She endorses pain on the right side of her back, better s/p pain meds.. She was admitted at [facility] yesterday and transferred to [facility] ED for higher level of care. She underwent right side chest tube placement in ED and is being transferred to ICU.    SUBJECTIVE:     7/14- has been stable overnight. Hgb downtrending to 7. pt receiving 1 unit pRBCs. She states pain R back is 4-5/10. No other complaints. No SOB or cough. Chest tube has so far drained 600mL bloody output    7/15 the chest tube continues to drain as much fluid around it as it does through it.  Do not see any residual pneumothorax on today's film.     7/16 the thoracostomy tube continues to drain.  The patient is doing well.  She sat up in the chair for hours.  She ate.  Her pain is controlled.    7/17 The patient is now on the cardiology floor. Her CT put out 220 in the last 24 hours.  The patient is complaining of pain.  She did not get out of bed today because her chest tube and ribs were hurting so badly today.  Explained how important it is for her to get out of bed tomorrow.  Discussed with her nurse that she needs some pain medicine and accurate I's and O's and dressing changes to her site.    7/18 the patient had 90 cc recorded from her thoracostomy tube overnight.  Her chest x-ray shows that the apical and basilar pneumothorax have resolved.  There is a bit more pleural fluid.  On examination of the patient the chest tube was kinked but when opened there was still no drainage of pleural fluid.  The tube was removed without incident.    7/19 The patient is in the chair.  She is tired.  She had a CXR which shows a slightly larger effusion.  She had a  chest x-ray    7/20 patient is tired from working with physical therapy.  She sat up for a few minutes she stood up for a few minutes she is now sitting in the chair.  She is on 2 L of oxygen.    OBJECTIVE:     Vitals (Most recent):  Vitals:    07/20/23 1156   BP:    Pulse:    Resp: 18   Temp:        Vitals (24 hour range):  Temp:  [97.2 °F (36.2 °C)-98.4 °F (36.9 °C)]   Pulse:  []   Resp:  [16-20]   BP: (111-147)/(53-77)   SpO2:  [95 %-100 %]       Intake/Output Summary (Last 24 hours) at 7/20/2023 1200  Last data filed at 7/20/2023 0858  Gross per 24 hour   Intake 600 ml   Output 300 ml   Net 300 ml          PHYSICAL EXAM  GENERAL: Older patient in no distress.  HEENT: Pupils equal and reactive. Extraocular movements intact. Nose intact.   Pharynx moist.  NECK: Supple.   HEART: Regular rate and rhythm. No murmur or gallop auscultated.  LUNGS:  Decreased posteriorly on the R.  Lung excursion symmetrical. No change in fremitus.  ABDOMEN: Bowel sounds present. Non-tender, no masses palpated.  : Normal anatomy.  EXTREMITIES: Normal muscle tone and joint movement, no cyanosis or clubbing.   LYMPHATICS: No adenopathy palpated, no edema.  SKIN: Dry, intact, no lesions.   NEURO: Cranial nerves II-XII intact. Motor strength 5/5 bilaterally, upper and lower extremities.  PSYCH: Appropriate affect.      Radiographs reviewed: view by direct vision   CXR 7/14- R chest tube in place tip projects toward mediastinum, improved aeration R lower lung. Tiny apical pneumothorax visible   7/15 chest x-ray is unchanged  7/16 the right lower lobe is opening up.  There is no pneumothorax visible.  7/17 there is a tiny apical pneumothorax and a small basilar pneumothorax showing on today's chest x-ray.   7/18 the pneumothorax has resolved.  There is a little more fluid in the bottom of the thorax.  7/19 there is a bit more fluid in the bottom of the right hemithorax.  Otherwise it is stable    Labs     Recent Labs   Lab  07/20/23  0430   WBC 8.01   HGB 8.5*   HCT 26.3*        Recent Labs   Lab 07/20/23  0430   *   K 3.6      CO2 29   BUN 12   CREATININE 0.5      CALCIUM 8.3*   MG 1.8   AST 16   ALT 13   ALKPHOS 52*   BILITOT 0.7   PROT 4.7*   ALBUMIN 2.6*   No results for input(s): PH, PCO2, PO2, HCO3 in the last 24 hours.  Microbiology Results (last 7 days)       ** No results found for the last 168 hours. **            Impression:  Active Hospital Problems    Diagnosis  POA    *Traumatic hemopneumothorax, initial encounter [S27.2XXA]  Yes    Atrial fibrillation [I48.91]  Yes    HLD (hyperlipidemia) [E78.5]  Yes    Lumbar compression fracture [S32.000A]  Yes    Right lower lobe lung mass [R91.8]  Yes    Closed fracture of ramus of left pubis [S32.592A]  Yes    Fall [W19.XXXA]  Yes    Troponin level elevated [R77.8]  Yes    Multiple closed fractures of ribs of right side [S22.41XA]  Yes    Cardiac pacemaker in situ [Z95.0]  Yes    Primary hypertension [I10]  Yes    Acute posthemorrhagic anemia [D62]  Yes    Contusion of lung, closed, initial encounter [S27.329A]  Yes    Compression fracture of body of thoracic vertebra [S22.000A]  Yes    Hyponatremia [E87.1]  Yes      Resolved Hospital Problems   No resolved problems to display.         Traumatic hemopneumothorax  - thoracostomy tube removed  Anemia  - a little lower  - SCDs instead of enoxaparin  Thrombocytopenia  - resolved  Right lower lobe contusion  Multiple rib fractures  - patient having pain controlled with hydrocodone    Patient appears ready to discharge to rehab or skilled as is appropriate.  Please call if we can be of assistance  The patient should follow-up in 1 month with a new chest x-ray

## 2023-07-20 NOTE — SUBJECTIVE & OBJECTIVE
Interval History:  Very orthostatic today.  Continue to work on placement.  Will check orthostatic vital signs.  She has no other complaints other than her chronic back pain.    Review of Systems   All other systems reviewed and are negative.  Objective:     Vital Signs (Most Recent):  Temp: 97.7 °F (36.5 °C) (07/20/23 0805)  Pulse: 95 (07/20/23 0805)  Resp: 18 (07/20/23 1156)  BP: 126/65 (07/20/23 0805)  SpO2: 98 % (07/20/23 0805) Vital Signs (24h Range):  Temp:  [97.2 °F (36.2 °C)-98.4 °F (36.9 °C)] 97.7 °F (36.5 °C)  Pulse:  [] 95  Resp:  [16-20] 18  SpO2:  [95 %-100 %] 98 %  BP: (111-147)/(53-77) 126/65     Weight: 48 kg (105 lb 13.1 oz)  Body mass index is 18.16 kg/m².    Intake/Output Summary (Last 24 hours) at 7/20/2023 1234  Last data filed at 7/20/2023 0858  Gross per 24 hour   Intake 600 ml   Output 300 ml   Net 300 ml           Physical Exam  Constitutional:       Appearance: Normal appearance.      Comments: frail   HENT:      Head: Normocephalic and atraumatic.      Nose: Nose normal.      Mouth/Throat:      Mouth: Mucous membranes are moist.   Eyes:      Conjunctiva/sclera: Conjunctivae normal.      Pupils: Pupils are equal, round, and reactive to light.   Cardiovascular:      Rate and Rhythm: Normal rate and regular rhythm.      Pulses: Normal pulses.      Heart sounds: Normal heart sounds. No murmur heard.    No friction rub. No gallop.   Pulmonary:      Effort: Pulmonary effort is normal.      Breath sounds: Normal breath sounds. No wheezing or rales.      Comments: Chest tube removed  Abdominal:      General: Abdomen is flat. Bowel sounds are normal. There is no distension.      Palpations: Abdomen is soft.      Tenderness: There is no abdominal tenderness. There is no guarding.   Musculoskeletal:         General: No swelling. Normal range of motion.      Cervical back: Normal range of motion and neck supple.   Skin:     General: Skin is warm and dry.   Neurological:      General: No focal  deficit present.      Mental Status: She is alert.   Psychiatric:         Mood and Affect: Mood normal.         Thought Content: Thought content normal.         Judgment: Judgment normal.           Significant Labs: All pertinent labs within the past 24 hours have been reviewed.    Significant Imaging: I have reviewed all pertinent imaging results/findings within the past 24 hours.

## 2023-07-20 NOTE — PROGRESS NOTES
[facility] Medicine  Progress Note    Patient Name: Alvina Guallpa  MRN: [id number]  Patient Class: IP- Inpatient   Admission Date: 7/13/2023  Length of Stay: 7 days  Attending Physician: Armaan Sahu MD  Primary Care Provider: EDY Hurd        Subjective:     Principal Problem:Traumatic hemopneumothorax, initial encounter        HPI:  Patient is an 83-year-old female with a history of AFib, hypertension, hyperlipidemia, scoliosis with multiple chronic spinal degenerative changes with chronic back pain, history of pelvic fracture and hip fracture in the past who presents after tripping and falling at home.  She was recently replaced on Eliquis by her cardiologist due to episodes of AFib.  She tripped and fell at home onto her right side and developed severe pain in her side.  She went to the ER at [place] and was evaluated and found to have rib fractures at level 8 and 9 in addition to what appeared to be multiple chronic spinal compression fractures on CT imaging.  CT of the chest reveals hemopneumothorax with small apical pneumothorax.  She was admitted there and monitored with repeat CT scan today showing enlarging hemopneumothorax.    Additionally, she was noted to have EKG changes with mildly elevated troponin though her troponin level trended down.  She is not having any chest pain.  She had an episode of hypotension that improved with IV fluids.    She was transferred per  transfer to [facility] for further evaluation.  EN route, per Pulmonary request the patient was redirected to the ER for further evaluation and chest tube placement.    On arrival in the ED:  The patient's vital signs are stable.  She is mildly tachycardic with heart rate in the low 100s.  She is comfortable and easily communicating.  She says she has back pain that is similar to her chronic back pain.  She has mild right rib pain.  Her pelvis is without pain at this time.  She has no  other complaints.  She is breathing easily.  I discussed with the ED provider and Dr. Baker has graciously assisted with placement of chest tube.  Additionally, Dr Baker discussed with Dr Weiss who will consult for any potential need of surgical involvement and will assist with management of the chest tube.  I discussed with Dr. Carrasco and with Dr. Dhaliwal from the Pulmonary team regarding further management.  The patient will be moved to the ICU and will complete trauma workup with scans once chest tube is placed.    See referring provider note below.    83-year-old woman with PMH HTN, TIA (on Eliquis), pacemaker, and osteoporosis adm to  on 07/12 after falling and hitting her right side on her wheelchair.  Patient said that she tripped.  There was no LOC and she did not hit her head.  ROS pos for right-sided chest wall pain.       On admission /93, HR 95.  Exam was pos for bruising on the right chest and arms and impaired recent memory.  Labs:  WBC 13.1, Hb 11.7, Na 131, K 4.9, Cr 1.2.      CT chest WO contrast pos for acute fractures of right ribs 8 -9 with small pneumothorax and extensive subcutaneous emphysema.  Small right hemothorax, right lower lung contusion/hematoma.  Also,, extensive compression deformities throughout the thoracic spine of uncertain chronicity.      EKG NSR notable for small T-wave inversion V2-4 which were not present on an earlier EKG (03/2020).      Patient became hypotensive this morning on her way to radiology for a repeat CT chest.  BP improved when patient placed in Trendelenburg and given IVF.      Repeat ECG  (829 h) pos for deep T-wave inversion in V2-6 which was not present on the earlier ECG. Troponin 0.037 > 0.013.      CT chest today pos for interval increase in right pleural effusion/ hemothorax with no significant change in small pneumothorax.  Also, increasing atelectasis of the right lower lobe obscuring the previously described hematoma/ contusion.   Also, mild atelectasis on the left.       Patient complained of left hip pain this morning.  X-ray pos for left superior pubic ramus fracture of uncertain chronicity possibly acute.  also,, old intertrochanteric left hip fracture s/p ORIF.  Severe bony demineralization and degenerative changes noted.     VS (12 18 h) /62, , RR 15, SpO2 100% (2 L).  Labs WBC 13.1 > 10.2, Hb 11.7 > 9.7, Na 131> 132, Cr 1.2 > 0.8     Transfer requested for higher level of care.  Transfer diagnosis multiple right-sided rib fractures, right pulmonary contusion, right apical pneumothorax (small), rt > lt pleural effusion, left pubic ramus fracture of uncertain chronicity, EKG changes concerning for ischemia      Overview/Hospital Course:  Alvina Guallpa is an 83 year old female with a past medical history of Afib on Xarelto, cardiac pacemaker, HTN, anemia, HLD, and osteopenia with chronic thoracic, lumbar, left femur and pelvic fractures who presented with a mechanical fall leading to right sided 8th and 9th rib fractures with a hemopneumothorax. She had a chest tube placed in the ED upon transfer from [place]. Her Hgb fell to 7 as there was significant output from the chest tube once placed. She received one unit of FFP and one unit of PRBCs. Home Xarelto is currently held. Hgb is being trended and has improved; output from the chest tube is also decreasing. Pulmonary is following. She is stable on room air currently. Daily serial CXRs show progressive improvement. Troponin is also being trended given mild elevation with TWIs at the lateral leads. Cardiology has been consulted and is considering stress testing and/or angiogram. Transfer orders to leave the ICU were placed 7/16. She is pending SNF placement once medically appropriate.      Interval History:  Very orthostatic today.  Continue to work on placement.  Will check orthostatic vital signs.  She has no other complaints other than her chronic back pain.    Review of  Systems   All other systems reviewed and are negative.  Objective:     Vital Signs (Most Recent):  Temp: 97.7 °F (36.5 °C) (07/20/23 0805)  Pulse: 95 (07/20/23 0805)  Resp: 18 (07/20/23 1156)  BP: 126/65 (07/20/23 0805)  SpO2: 98 % (07/20/23 0805) Vital Signs (24h Range):  Temp:  [97.2 °F (36.2 °C)-98.4 °F (36.9 °C)] 97.7 °F (36.5 °C)  Pulse:  [] 95  Resp:  [16-20] 18  SpO2:  [95 %-100 %] 98 %  BP: (111-147)/(53-77) 126/65     Weight: 48 kg (105 lb 13.1 oz)  Body mass index is 18.16 kg/m².    Intake/Output Summary (Last 24 hours) at 7/20/2023 1234  Last data filed at 7/20/2023 0858  Gross per 24 hour   Intake 600 ml   Output 300 ml   Net 300 ml           Physical Exam  Constitutional:       Appearance: Normal appearance.      Comments: frail   HENT:      Head: Normocephalic and atraumatic.      Nose: Nose normal.      Mouth/Throat:      Mouth: Mucous membranes are moist.   Eyes:      Conjunctiva/sclera: Conjunctivae normal.      Pupils: Pupils are equal, round, and reactive to light.   Cardiovascular:      Rate and Rhythm: Normal rate and regular rhythm.      Pulses: Normal pulses.      Heart sounds: Normal heart sounds. No murmur heard.    No friction rub. No gallop.   Pulmonary:      Effort: Pulmonary effort is normal.      Breath sounds: Normal breath sounds. No wheezing or rales.      Comments: Chest tube removed  Abdominal:      General: Abdomen is flat. Bowel sounds are normal. There is no distension.      Palpations: Abdomen is soft.      Tenderness: There is no abdominal tenderness. There is no guarding.   Musculoskeletal:         General: No swelling. Normal range of motion.      Cervical back: Normal range of motion and neck supple.   Skin:     General: Skin is warm and dry.   Neurological:      General: No focal deficit present.      Mental Status: She is alert.   Psychiatric:         Mood and Affect: Mood normal.         Thought Content: Thought content normal.         Judgment: Judgment normal.            Significant Labs: All pertinent labs within the past 24 hours have been reviewed.    Significant Imaging: I have reviewed all pertinent imaging results/findings within the past 24 hours.      Assessment/Plan:      * Traumatic hemopneumothorax, initial encounter  Improving.  -Chest tube placed by Dr. Baker in the ED now removed and site is clean dry and intact  -Hold home anticoagulation  -Trend CBC  -Pulmonary consulted  - follow-up x-rays with resolved pneumothorax.  Small right pleural effusion.      Right lower lobe lung mass  -Continue to monitor  -Pulmonary consulted  -Dedicated imaging once hemopneumothorax resolves      Lumbar compression fracture  Chronic.  -PT/OT  -PRN analgesics      HLD (hyperlipidemia)  -Continue statin      Atrial fibrillation  -Telemetry  -Hold anticoagulation  -Continue diltiazem    Troponin level elevated  Mildly elevated. Stable. EKG shows TWIs at lateral leads. TTE unremarkable.  -Continue telemetry  -Cardiology consultation    Fall  -Fall precautions  -PT/OT  -CM consulted for SNF placement  - orthostatic today check orthostatic vital signs  - Needs Art hose and possible gentle IV fluids    Closed fracture of ramus of left pubis  Chronic.  -PT/OT when able  - pending SNF placement      Compression fracture of body of thoracic vertebra  Chronic.  -PRN analgesics  -PT/OT when able      Contusion of lung, closed, initial encounter  Secondary to fall.  -PRN analgesics      Hyponatremia  Mild. In setting of trauma.  -Continue to trend Na      Acute posthemorrhagic anemia  Improving.  -S/p FFP and one unit PRBCs  -Hold anticoagulation  -Trend CBC > downtrending but stable today  - monitor closely      Primary hypertension  -Hold home BP medications with exception of home diltiazem  -Continue to monitor      Cardiac pacemaker in situ  History of SSS. Stable.      Multiple closed fractures of ribs of right side  8th and 9th rib fracture. Stable.  -PRN analgesics        VTE  Risk Mitigation (From admission, onward)         Ordered     IP VTE HIGH RISK PATIENT  Once         07/13/23 1650     Place sequential compression device  Until discontinued         07/13/23 1650                Discharge Planning   QUIQUE: 7/24/2023     Code Status: Full Code   Is the patient medically ready for discharge?:     Reason for patient still in hospital (select all that apply): Treatment  Discharge Plan A: Skilled Nursing Facility   Discharge Delays: None known at this time              Armaan Sahu MD  Department of Hospital Medicine   [facility]

## 2023-07-21 LAB
ALBUMIN SERPL BCP-MCNC: 2.9 G/DL (ref 3.5–5.2)
ALP SERPL-CCNC: 58 U/L (ref 55–135)
ALT SERPL W/O P-5'-P-CCNC: 16 U/L (ref 10–44)
ANION GAP SERPL CALC-SCNC: 8 MMOL/L (ref 8–16)
AST SERPL-CCNC: 17 U/L (ref 10–40)
BASOPHILS # BLD AUTO: 0.04 K/UL (ref 0–0.2)
BASOPHILS NFR BLD: 0.5 % (ref 0–1.9)
BILIRUB SERPL-MCNC: 0.6 MG/DL (ref 0.1–1)
BUN SERPL-MCNC: 15 MG/DL (ref 8–23)
CALCIUM SERPL-MCNC: 8.5 MG/DL (ref 8.7–10.5)
CHLORIDE SERPL-SCNC: 98 MMOL/L (ref 95–110)
CO2 SERPL-SCNC: 29 MMOL/L (ref 23–29)
CREAT SERPL-MCNC: 0.5 MG/DL (ref 0.5–1.4)
DIFFERENTIAL METHOD: ABNORMAL
EOSINOPHIL # BLD AUTO: 0.5 K/UL (ref 0–0.5)
EOSINOPHIL NFR BLD: 5.5 % (ref 0–8)
ERYTHROCYTE [DISTWIDTH] IN BLOOD BY AUTOMATED COUNT: 12.7 % (ref 11.5–14.5)
EST. GFR  (NO RACE VARIABLE): >60 ML/MIN/1.73 M^2
GLUCOSE SERPL-MCNC: 110 MG/DL (ref 70–110)
HCT VFR BLD AUTO: 30 % (ref 37–48.5)
HGB BLD-MCNC: 9.5 G/DL (ref 12–16)
IMM GRANULOCYTES # BLD AUTO: 0.05 K/UL (ref 0–0.04)
IMM GRANULOCYTES NFR BLD AUTO: 0.6 % (ref 0–0.5)
LYMPHOCYTES # BLD AUTO: 1.3 K/UL (ref 1–4.8)
LYMPHOCYTES NFR BLD: 14.7 % (ref 18–48)
MAGNESIUM SERPL-MCNC: 1.9 MG/DL (ref 1.6–2.6)
MCH RBC QN AUTO: 30.6 PG (ref 27–31)
MCHC RBC AUTO-ENTMCNC: 31.7 G/DL (ref 32–36)
MCV RBC AUTO: 97 FL (ref 82–98)
MONOCYTES # BLD AUTO: 0.8 K/UL (ref 0.3–1)
MONOCYTES NFR BLD: 9.2 % (ref 4–15)
NEUTROPHILS # BLD AUTO: 6 K/UL (ref 1.8–7.7)
NEUTROPHILS NFR BLD: 69.5 % (ref 38–73)
NRBC BLD-RTO: 0 /100 WBC
PLATELET # BLD AUTO: 376 K/UL (ref 150–450)
PMV BLD AUTO: 11.1 FL (ref 9.2–12.9)
POTASSIUM SERPL-SCNC: 3.7 MMOL/L (ref 3.5–5.1)
PROT SERPL-MCNC: 5.6 G/DL (ref 6–8.4)
RBC # BLD AUTO: 3.1 M/UL (ref 4–5.4)
SODIUM SERPL-SCNC: 135 MMOL/L (ref 136–145)
WBC # BLD AUTO: 8.58 K/UL (ref 3.9–12.7)

## 2023-07-21 PROCEDURE — 83735 ASSAY OF MAGNESIUM: CPT | Performed by: STUDENT IN AN ORGANIZED HEALTH CARE EDUCATION/TRAINING PROGRAM

## 2023-07-21 PROCEDURE — 36415 COLL VENOUS BLD VENIPUNCTURE: CPT | Performed by: STUDENT IN AN ORGANIZED HEALTH CARE EDUCATION/TRAINING PROGRAM

## 2023-07-21 PROCEDURE — 80053 COMPREHEN METABOLIC PANEL: CPT | Performed by: STUDENT IN AN ORGANIZED HEALTH CARE EDUCATION/TRAINING PROGRAM

## 2023-07-21 PROCEDURE — 25000003 PHARM REV CODE 250: Performed by: STUDENT IN AN ORGANIZED HEALTH CARE EDUCATION/TRAINING PROGRAM

## 2023-07-21 PROCEDURE — 85025 COMPLETE CBC W/AUTO DIFF WBC: CPT | Performed by: STUDENT IN AN ORGANIZED HEALTH CARE EDUCATION/TRAINING PROGRAM

## 2023-07-21 PROCEDURE — 25000003 PHARM REV CODE 250

## 2023-07-21 PROCEDURE — 21400001 HC TELEMETRY ROOM

## 2023-07-21 RX ADMIN — HYDROCODONE BITARTRATE AND ACETAMINOPHEN 1 TABLET: 5; 325 TABLET ORAL at 10:07

## 2023-07-21 RX ADMIN — HYDROCODONE BITARTRATE AND ACETAMINOPHEN 1 TABLET: 5; 325 TABLET ORAL at 06:07

## 2023-07-21 RX ADMIN — HYDROCODONE BITARTRATE AND ACETAMINOPHEN 1 TABLET: 5; 325 TABLET ORAL at 02:07

## 2023-07-21 RX ADMIN — METOPROLOL TARTRATE 12.5 MG: 25 TABLET, FILM COATED ORAL at 08:07

## 2023-07-21 RX ADMIN — TRAZODONE HYDROCHLORIDE 50 MG: 50 TABLET ORAL at 08:07

## 2023-07-21 RX ADMIN — DILTIAZEM HYDROCHLORIDE 120 MG: 120 CAPSULE, COATED, EXTENDED RELEASE ORAL at 08:07

## 2023-07-21 RX ADMIN — HYDROCODONE BITARTRATE AND ACETAMINOPHEN 1 TABLET: 5; 325 TABLET ORAL at 04:07

## 2023-07-21 RX ADMIN — ATORVASTATIN CALCIUM 10 MG: 10 TABLET, FILM COATED ORAL at 08:07

## 2023-07-21 NOTE — PLAN OF CARE
"CM s/w Jhoan (gregg) regarding discharge plan. PRAVEEN explained that [facility] has reached out to  earlier this morning. [facility] no longer has a SNF in [place]. The closet [facility] unit is in [place]. Jhoan clarified that he would prefer [facility] over [facility] swing bed. PRAVEEN explained to Jhoan that [facility] is reviewing referral but may not have an open bed. Jhoan states "I really don't want the nursing home but if we have no other choice try [facility]". CM sent referral to [facility].    Cm contacted Fadia at [facility] to follow up on status of referral. Referral is under review.    1528- Per Fadia at [facility] pt needs to participate more in therapy. [facility] referral is pending pts progress.       PRAVEEN met with Jhoan at bedside and updated on plan of care and referrals.PRAVEEN and Jhoan encouraged pt to participate in therapy.       07/21/23 1058   Discharge Reassessment   Assessment Type Discharge Planning Reassessment   Did the patient's condition or plan change since previous assessment? No   Discharge Plan discussed with: Caregiver   Name(s) and Number(s) Jhoan [phone]   Communicated QUIQUE with patient/caregiver Yes   Discharge Plan A Skilled Nursing Facility   Discharge Plan B Skilled Nursing Facility   DME Needed Upon Discharge  other (see comments)   Why the patient remains in the hospital Requires continued medical care   Post-Acute Status   Post-Acute Authorization Placement   Post-Acute Placement Status Referrals Sent       "

## 2023-07-21 NOTE — PROGRESS NOTES
07/21/23 1130   Vital Signs   Resp 18   SpO2 99 %   Device (Oxygen Therapy) room air  (weaned to room air)

## 2023-07-21 NOTE — PT/OT/SLP PROGRESS
Physical Therapy      Patient Name:  Alvina Guallpa   MRN:  [id number]    Patient not seen today secondary to with attempt at 09:58, pt c/o severe back pain, anxious, tachypneic. Nurse made aware.     Attempt at 14:00, pt just returning to bed with nursing assist following [facility]IC x 2 hr. Declined additional activity or bed exercises. Will follow-up 7/22/23.

## 2023-07-22 PROCEDURE — 25000003 PHARM REV CODE 250: Performed by: STUDENT IN AN ORGANIZED HEALTH CARE EDUCATION/TRAINING PROGRAM

## 2023-07-22 PROCEDURE — 25000003 PHARM REV CODE 250

## 2023-07-22 PROCEDURE — 97165 OT EVAL LOW COMPLEX 30 MIN: CPT

## 2023-07-22 PROCEDURE — 97116 GAIT TRAINING THERAPY: CPT

## 2023-07-22 PROCEDURE — 21400001 HC TELEMETRY ROOM

## 2023-07-22 PROCEDURE — 25000003 PHARM REV CODE 250: Performed by: INTERNAL MEDICINE

## 2023-07-22 PROCEDURE — 97535 SELF CARE MNGMENT TRAINING: CPT

## 2023-07-22 RX ORDER — POLYETHYLENE GLYCOL 3350 17 G/17G
17 POWDER, FOR SOLUTION ORAL DAILY
Status: DISCONTINUED | OUTPATIENT
Start: 2023-07-22 | End: 2023-07-25 | Stop reason: HOSPADM

## 2023-07-22 RX ADMIN — DILTIAZEM HYDROCHLORIDE 120 MG: 120 CAPSULE, COATED, EXTENDED RELEASE ORAL at 08:07

## 2023-07-22 RX ADMIN — TRAZODONE HYDROCHLORIDE 50 MG: 50 TABLET ORAL at 08:07

## 2023-07-22 RX ADMIN — HYDROCODONE BITARTRATE AND ACETAMINOPHEN 1 TABLET: 5; 325 TABLET ORAL at 05:07

## 2023-07-22 RX ADMIN — METOPROLOL TARTRATE 12.5 MG: 25 TABLET, FILM COATED ORAL at 08:07

## 2023-07-22 RX ADMIN — ATORVASTATIN CALCIUM 10 MG: 10 TABLET, FILM COATED ORAL at 08:07

## 2023-07-22 RX ADMIN — HYDROCODONE BITARTRATE AND ACETAMINOPHEN 1 TABLET: 5; 325 TABLET ORAL at 08:07

## 2023-07-22 RX ADMIN — HYDROCODONE BITARTRATE AND ACETAMINOPHEN 1 TABLET: 5; 325 TABLET ORAL at 12:07

## 2023-07-22 RX ADMIN — HYDROCODONE BITARTRATE AND ACETAMINOPHEN 1 TABLET: 5; 325 TABLET ORAL at 07:07

## 2023-07-22 RX ADMIN — POLYETHYLENE GLYCOL 3350 17 G: 17 POWDER, FOR SOLUTION ORAL at 08:07

## 2023-07-22 NOTE — SUBJECTIVE & OBJECTIVE
Interval History:     Review of Systems   Unable to perform ROS: Mental status change   Objective:     Vital Signs (Most Recent):  Temp: 98.3 °F (36.8 °C) (07/21/23 1916)  Pulse: 100 (07/21/23 2022)  Resp: 20 (07/21/23 1916)  BP: 138/66 (07/21/23 2022)  SpO2: 96 % (07/21/23 1916) Vital Signs (24h Range):  Temp:  [97.3 °F (36.3 °C)-98.3 °F (36.8 °C)] 98.3 °F (36.8 °C)  Pulse:  [] 100  Resp:  [16-20] 20  SpO2:  [95 %-100 %] 96 %  BP: ()/(57-75) 138/66     Weight: 48 kg (105 lb 13.1 oz)  Body mass index is 18.16 kg/m².    Intake/Output Summary (Last 24 hours) at 7/21/2023 2209  Last data filed at 7/21/2023 1605  Gross per 24 hour   Intake 720 ml   Output 250 ml   Net 470 ml         Physical Exam  Vitals and nursing note reviewed.   Constitutional:       General: She is not in acute distress.     Comments: Confused    HENT:      Head: Atraumatic.      Right Ear: External ear normal.      Left Ear: External ear normal.      Nose: Nose normal.      Mouth/Throat:      Mouth: Mucous membranes are moist.   Cardiovascular:      Rate and Rhythm: Normal rate.   Pulmonary:      Effort: Pulmonary effort is normal.   Musculoskeletal:         General: Normal range of motion.      Cervical back: Normal range of motion.   Skin:     General: Skin is dry.   Neurological:      Comments: Confused            Significant Labs: All pertinent labs within the past 24 hours have been reviewed.  CBC:   Recent Labs   Lab 07/20/23  0430 07/21/23  0448   WBC 8.01 8.58   HGB 8.5* 9.5*   HCT 26.3* 30.0*    376     CMP:   Recent Labs   Lab 07/20/23  0430 07/21/23  0448   * 135*   K 3.6 3.7    98   CO2 29 29    110   BUN 12 15   CREATININE 0.5 0.5   CALCIUM 8.3* 8.5*   PROT 4.7* 5.6*   ALBUMIN 2.6* 2.9*   BILITOT 0.7 0.6   ALKPHOS 52* 58   AST 16 17   ALT 13 16   ANIONGAP 5* 8       Significant Imaging: I have reviewed all pertinent imaging results/findings within the past 24 hours.

## 2023-07-22 NOTE — PLAN OF CARE
Problem: Adult Inpatient Plan of Care  Goal: Plan of Care Review  Outcome: Ongoing, Progressing     Problem: Adult Inpatient Plan of Care  Goal: Optimal Comfort and Wellbeing  Outcome: Ongoing, Progressing     Problem: Fall Injury Risk  Goal: Absence of Fall and Fall-Related Injury  Outcome: Ongoing, Progressing     Problem: Skin Injury Risk Increased  Goal: Skin Health and Integrity  Outcome: Ongoing, Progressing     Problem: Pain Acute  Goal: Acceptable Pain Control and Functional Ability  Outcome: Ongoing, Progressing

## 2023-07-22 NOTE — PROGRESS NOTES
[facility]  Progress Note    Patient Name: Alvina Guallpa  MRN: [id number]  Patient Class: IP- Inpatient   Admission Date: 7/13/2023  Length of Stay: 8 days  Attending Physician: Philip Quinonez MD  Primary Care Provider: EDY Hurd        Subjective:     Principal Problem:Traumatic hemopneumothorax, initial encounter        HPI:  Patient is an 83-year-old female with a history of AFib, hypertension, hyperlipidemia, scoliosis with multiple chronic spinal degenerative changes with chronic back pain, history of pelvic fracture and hip fracture in the past who presents after tripping and falling at home.  She was recently replaced on Eliquis by her cardiologist due to episodes of AFib.  She tripped and fell at home onto her right side and developed severe pain in her side.  She went to the ER at [facility] and was evaluated and found to have rib fractures at level 8 and 9 in addition to what appeared to be multiple chronic spinal compression fractures on CT imaging.  CT of the chest reveals hemopneumothorax with small apical pneumothorax.  She was admitted there and monitored with repeat CT scan today showing enlarging hemopneumothorax.    Additionally, she was noted to have EKG changes with mildly elevated troponin though her troponin level trended down.  She is not having any chest pain.  She had an episode of hypotension that improved with IV fluids.    She was transferred per  transfer to [facility] for further evaluation.  EN route, per Pulmonary request the patient was redirected to the ER for further evaluation and chest tube placement.    On arrival in the ED:  The patient's vital signs are stable.  She is mildly tachycardic with heart rate in the low 100s.  She is comfortable and easily communicating.  She says she has back pain that is similar to her chronic back pain.  She has mild right rib pain.  Her pelvis is without pain at this time.  She has no other  complaints.  She is breathing easily.  I discussed with the ED provider and Dr. Baker has graciously assisted with placement of chest tube.  Additionally, Dr Baker discussed with Dr Weiss who will consult for any potential need of surgical involvement and will assist with management of the chest tube.  I discussed with Dr. Carrasco and with Dr. Dhaliwal from the Pulmonary team regarding further management.  The patient will be moved to the ICU and will complete trauma workup with scans once chest tube is placed.    See referring provider note below.    83-year-old woman with PMH HTN, TIA (on Eliquis), pacemaker, and osteoporosis adm to  on 07/12 after falling and hitting her right side on her wheelchair.  Patient said that she tripped.  There was no LOC and she did not hit her head.  ROS pos for right-sided chest wall pain.       On admission /93, HR 95.  Exam was pos for bruising on the right chest and arms and impaired recent memory.  Labs:  WBC 13.1, Hb 11.7, Na 131, K 4.9, Cr 1.2.      CT chest WO contrast pos for acute fractures of right ribs 8 -9 with small pneumothorax and extensive subcutaneous emphysema.  Small right hemothorax, right lower lung contusion/hematoma.  Also,, extensive compression deformities throughout the thoracic spine of uncertain chronicity.      EKG NSR notable for small T-wave inversion V2-4 which were not present on an earlier EKG (03/2020).      Patient became hypotensive this morning on her way to radiology for a repeat CT chest.  BP improved when patient placed in Trendelenburg and given IVF.      Repeat ECG  (829 h) pos for deep T-wave inversion in V2-6 which was not present on the earlier ECG. Troponin 0.037 > 0.013.      CT chest today pos for interval increase in right pleural effusion/ hemothorax with no significant change in small pneumothorax.  Also, increasing atelectasis of the right lower lobe obscuring the previously described hematoma/ contusion.  Also, mild  atelectasis on the left.       Patient complained of left hip pain this morning.  X-ray pos for left superior pubic ramus fracture of uncertain chronicity possibly acute.  also,, old intertrochanteric left hip fracture s/p ORIF.  Severe bony demineralization and degenerative changes noted.     VS (12 18 h) /62, , RR 15, SpO2 100% (2 L).  Labs WBC 13.1 > 10.2, Hb 11.7 > 9.7, Na 131> 132, Cr 1.2 > 0.8     Transfer requested for higher level of care.  Transfer diagnosis multiple right-sided rib fractures, right pulmonary contusion, right apical pneumothorax (small), rt > lt pleural effusion, left pubic ramus fracture of uncertain chronicity, EKG changes concerning for ischemia      Overview/Hospital Course:  Alvina Guallpa is an 83 year old female with a past medical history of Afib on Xarelto, cardiac pacemaker, HTN, anemia, HLD, and osteopenia with chronic thoracic, lumbar, left femur and pelvic fractures who presented with a mechanical fall leading to right sided 8th and 9th rib fractures with a hemopneumothorax. She had a chest tube placed in the ED upon transfer from [place]. Her Hgb fell to 7 as there was significant output from the chest tube once placed. She received one unit of FFP and one unit of PRBCs. Home Xarelto is currently held. Hgb is being trended and has improved; output from the chest tube is also decreasing. Pulmonary is following. She is stable on room air currently. Daily serial CXRs show progressive improvement. Troponin is also being trended given mild elevation with TWIs at the lateral leads. Cardiology has been consulted and is considering stress testing and/or angiogram. Transfer orders to leave the ICU were placed 7/16. She is pending SNF placement once medically appropriate.    07/21  Pt confused  Having hospital delirium  Awaiting SNF       Interval History:     Review of Systems   Unable to perform ROS: Mental status change   Objective:     Vital Signs (Most Recent):  Temp:  98.3 °F (36.8 °C) (07/21/23 1916)  Pulse: 100 (07/21/23 2022)  Resp: 20 (07/21/23 1916)  BP: 138/66 (07/21/23 2022)  SpO2: 96 % (07/21/23 1916) Vital Signs (24h Range):  Temp:  [97.3 °F (36.3 °C)-98.3 °F (36.8 °C)] 98.3 °F (36.8 °C)  Pulse:  [] 100  Resp:  [16-20] 20  SpO2:  [95 %-100 %] 96 %  BP: ()/(57-75) 138/66     Weight: 48 kg (105 lb 13.1 oz)  Body mass index is 18.16 kg/m².    Intake/Output Summary (Last 24 hours) at 7/21/2023 2209  Last data filed at 7/21/2023 1605  Gross per 24 hour   Intake 720 ml   Output 250 ml   Net 470 ml         Physical Exam  Vitals and nursing note reviewed.   Constitutional:       General: She is not in acute distress.     Comments: Confused    HENT:      Head: Atraumatic.      Right Ear: External ear normal.      Left Ear: External ear normal.      Nose: Nose normal.      Mouth/Throat:      Mouth: Mucous membranes are moist.   Cardiovascular:      Rate and Rhythm: Normal rate.   Pulmonary:      Effort: Pulmonary effort is normal.   Musculoskeletal:         General: Normal range of motion.      Cervical back: Normal range of motion.   Skin:     General: Skin is dry.   Neurological:      Comments: Confused            Significant Labs: All pertinent labs within the past 24 hours have been reviewed.  CBC:   Recent Labs   Lab 07/20/23  0430 07/21/23  0448   WBC 8.01 8.58   HGB 8.5* 9.5*   HCT 26.3* 30.0*    376     CMP:   Recent Labs   Lab 07/20/23  0430 07/21/23  0448   * 135*   K 3.6 3.7    98   CO2 29 29    110   BUN 12 15   CREATININE 0.5 0.5   CALCIUM 8.3* 8.5*   PROT 4.7* 5.6*   ALBUMIN 2.6* 2.9*   BILITOT 0.7 0.6   ALKPHOS 52* 58   AST 16 17   ALT 13 16   ANIONGAP 5* 8       Significant Imaging: I have reviewed all pertinent imaging results/findings within the past 24 hours.      Assessment/Plan:      * Traumatic hemopneumothorax, initial encounter  Improving.  -Chest tube placed  in the ED now removed and site is clean dry and  intact  -Hold home anticoagulation  - follow-up x-rays with resolved pneumothorax.  Small right pleural effusion.      Right lower lobe lung mass  -Continue to monitor  -Dedicated imaging later      Lumbar compression fracture  Chronic.  -PT/OT  -PRN analgesics      HLD (hyperlipidemia)  -Continue statin      Atrial fibrillation  -Telemetry  -Hold anticoagulation  -Continue diltiazem    Troponin level elevated  Mildly elevated. Stable. EKG shows TWIs at lateral leads. TTE unremarkable.  -Continue telemetry      Fall  -Fall precautions  -CM consulted for SNF placement  - pt suffers from orthostatic hypotension    Closed fracture of ramus of left pubis  Chronic.  - pending SNF placement      Compression fracture of body of thoracic vertebra  Chronic.  -PRN analgesics        Contusion of lung, closed, initial encounter  Secondary to fall.  -PRN analgesics      Hyponatremia  Mild. In setting of trauma.  -Continue to trend Na      Acute posthemorrhagic anemia  Improving.  -S/p FFP and one unit PRBCs        Primary hypertension  -Hold home BP medications with exception of home diltiazem  -Continue to monitor      Cardiac pacemaker in situ  History of SSS. Stable.      Multiple closed fractures of ribs of right side  8th and 9th rib fracture. Stable.  -PRN analgesics        VTE Risk Mitigation (From admission, onward)         Ordered     Place NELL hose  Until discontinued         07/20/23 1237     IP VTE HIGH RISK PATIENT  Once         07/13/23 1650     Place sequential compression device  Until discontinued         07/13/23 1650                Discharge Planning   QUIQUE: 7/24/2023     Code Status: Full Code   Is the patient medically ready for discharge?:     Reason for patient still in hospital (select all that apply): Pending disposition  Discharge Plan A: Skilled Nursing Facility   Discharge Delays: None known at this time              Philip Quinonez MD  Department of Hospital Medicine   [facility]

## 2023-07-22 NOTE — PT/OT/SLP PROGRESS
Physical Therapy Treatment    Patient Name:  Alvina Guallpa   MRN:  [id number]    Recommendations:     Discharge Recommendations: nursing facility, skilled  Discharge Equipment Recommendations: to be determined by next level of care  Barriers to discharge:  requires increased assist with mobility for safety    Assessment:     Alvina Guallpa is a 83 y.o. female admitted with a medical diagnosis of Traumatic hemopneumothorax, initial encounter.  She presents with the following impairments/functional limitations: weakness, impaired endurance, impaired functional mobility, gait instability, impaired balance, decreased ROM, impaired cardiopulmonary response to activity.    Rehab Prognosis: Fair; patient would benefit from acute skilled PT services to address these deficits and reach maximum level of function.    Recent Surgery: * No surgery found *      Plan:     During this hospitalization, patient to be seen 6 x/week to address the identified rehab impairments via gait training, therapeutic activities, therapeutic exercises and progress toward the following goals:    Plan of Care Expires:  08/15/23    Subjective     Chief Complaint: fatigue  Patient/Family Comments/goals: rest  Pain/Comfort:  Pain Rating 1: 0/10      Objective:     Communicated with RN and OT prior to session.  Patient found HOB elevated with telemetry, PureWick, bed alarm, peripheral IV upon PT entry to room.     General Precautions: Standard, fall  Orthopedic Precautions: N/A  Braces: N/A  Respiratory Status: Room air     Functional Mobility:  Bed Mobility:     Supine to Sit: minimum assistance  Sit to Supine: minimum assistance  Transfers:     Sit to Stand:  contact guard assistance with rolling walker  Gait: 2x65' w/RW       AM-PAC 6 CLICK MOBILITY  Turning over in bed (including adjusting bedclothes, sheets and blankets)?: 3  Sitting down on and standing up from a chair with arms (e.g., wheelchair, bedside commode, etc.): 3  Moving from  lying on back to sitting on the side of the bed?: 3  Moving to and from a bed to a chair (including a wheelchair)?: 3  Need to walk in hospital room?: 3  Climbing 3-5 steps with a railing?: 2  Basic Mobility Total Score: 17       Treatment & Education:  Pt was educated on the following: call light use, importance of OOB activity and functional mobility to negate the negative effects of prolonged bed rest during this hospitalization, safe transfers/ambulation and discharge planning recommendations/options.     Patient left HOB elevated with all lines intact, call button in reach, bed alarm on, and RN notified..    GOALS:   Multidisciplinary Problems       Physical Therapy Goals          Problem: Physical Therapy    Goal Priority Disciplines Outcome Goal Variances Interventions   Physical Therapy Goal     PT, PT/OT Ongoing, Progressing     Description: Goals to be met by: 8/15/2023     Patient will increase functional independence with mobility by performin. Supine to sit with Stand-by Assistance  2. Sit to stand transfer with Stand-by Assistance  3. Gait  x 150 feet with Stand-by Assistance using Rolling Walker.                          Time Tracking:     PT Received On: 23  PT Start Time: 1030     PT Stop Time: 1039  PT Total Time (min): 9 min     Billable Minutes: Gait Training 9    Treatment Type: Treatment  PT/PTA: PT     Number of PTA visits since last PT visit: 0     2023

## 2023-07-22 NOTE — ASSESSMENT & PLAN NOTE
Improving.  -Chest tube placed  in the ED now removed and site is clean dry and intact  -Hold home anticoagulation  - follow-up x-rays with resolved pneumothorax.  Small right pleural effusion.

## 2023-07-22 NOTE — PLAN OF CARE
Problem: Adult Inpatient Plan of Care  Goal: Plan of Care Review  Outcome: Ongoing, Progressing  Goal: Patient-Specific Goal (Individualized)  Outcome: Ongoing, Progressing  Goal: Absence of Hospital-Acquired Illness or Injury  Outcome: Ongoing, Progressing  Goal: Optimal Comfort and Wellbeing  Outcome: Ongoing, Progressing  Goal: Readiness for Transition of Care  Outcome: Ongoing, Progressing     Problem: Fall Injury Risk  Goal: Absence of Fall and Fall-Related Injury  Outcome: Ongoing, Progressing     Problem: Skin Injury Risk Increased  Goal: Skin Health and Integrity  Outcome: Ongoing, Progressing     Problem: Oral Intake Inadequate  Goal: Improved Oral Intake  Outcome: Ongoing, Progressing     Problem: Pain Acute  Goal: Acceptable Pain Control and Functional Ability  Outcome: Ongoing, Progressing

## 2023-07-22 NOTE — PT/OT/SLP EVAL
"Occupational Therapy   Evaluation    Name: Alvina Guallpa  MRN: [id number]  Admitting Diagnosis: Traumatic hemopneumothorax, initial encounter  Recent Surgery: * No surgery found *      Recommendations:     Discharge Recommendations: nursing facility, skilled  Discharge Equipment Recommendations:  to be determined by next level of care  Barriers to discharge:  None    Assessment:     Alvina Guallpa is a 83 y.o. female with a medical diagnosis of Traumatic hemopneumothorax, initial encounter.  She presents with c/o worsening lightheadedness with prolonged ADL participation and mobility. Patient reported no lightheadedness initially when sitting EOB. However, after performing toileting tasks and ambulating to sink, patient reported fatigue and requested to sit after washing her hands. Patient then reported exacerbating lightheadedness requiring complete rest from all activity at sink. Noted patient rested her head on her forearm when seated at sink. Patient stated "I think I'm going to pass out." Patient was given extra time for recovery and did not experience a syncopal episode. Patient's BP was 122/60 with  at time of complaint.    Patient ambulated back to bed requiring CGA using RW. Further participation with ADL participation and mobility was limited due to lightheadedness.    Performance deficits affecting function: weakness, impaired endurance, impaired functional mobility, gait instability, impaired self care skills, impaired cardiopulmonary response to activity, impaired balance.      Rehab Prognosis: Good; patient would benefit from acute skilled OT services to address these deficits and reach maximum level of function.       Plan:     Patient to be seen 5 x/week to address the above listed problems via self-care/home management, therapeutic activities, therapeutic exercises  Plan of Care Expires: 08/19/23  Plan of Care Reviewed with: patient    Subjective     Chief Complaint: lightheadedness " with activity  Patient/Family Comments/goals: none    Occupational Profile:  Living Environment: Patient lives with her grandson and grandson's spouse.   Previous level of function: Patient was independent with ADLs and ambulatory using RW.  Equipment Used at Home: walker, rolling, walker, standard, bedside commode, cane, straight, wheelchair  Assistance upon Discharge: Patient will receive assistance from family.     Pain/Comfort:  Pain Rating Post-Intervention 1: 0/10    Patients cultural, spiritual, Yazidi conflicts given the current situation: no    Objective:     Communicated with: nurse Van prior to session.  Patient found HOB elevated with bed alarm, PureWick, telemetry, peripheral IV upon OT entry to room.    General Precautions: Standard, fall  Orthopedic Precautions: N/A  Braces: N/A  Respiratory Status: Room air    Occupational Performance:    Bed Mobility:    Patient completed Scooting/Bridging with stand by assistance  Patient completed Supine to Sit with stand by assistance  Patient completed Sit to Supine with stand by assistance    Functional Mobility/Transfers:  Patient completed Sit <> Stand Transfer with contact guard assistance  with  rolling walker   Patient completed Toilet Transfer Stand Pivot technique with contact guard assistance with  rolling walker    Activities of Daily Living:  Grooming: stand by assistance with hand hygiene seated at sink  Lower Body Dressing: stand by assistance to don/doff socks while seated EOB  Toileting: stand by assistance with elvis-hygiene after voiding while seated on toilet    Cognitive/Visual Perceptual:  Cognitive/Psychosocial Skills:     -       Oriented to: x4   -       Follows Commands/attention:Follows multistep  commands  -       Communication: clear/fluent  -       Safety awareness/insight to disability: intact   -       Mood/Affect/Coping skills/emotional control: Appropriate to situation, Cooperative, and Pleasant  Visual/Perceptual:       -Intact     Physical Exam:  Postural examination/scapula alignment:    -       Rounded shoulders  -       Forward head  Upper Extremity Range of Motion:     -       Right Upper Extremity: WFL  -       Left Upper Extremity: WFL  Upper Extremity Strength:    -       Right Upper Extremity: WFL  -       Left Upper Extremity: WFL   Strength:    -       Right Upper Extremity: WFL  -       Left Upper Extremity: WFL  Fine Motor Coordination:    -       Intact  Gross motor coordination:   WFL    AMPAC 6 Click ADL:  AMPAC Total Score: 20    Treatment & Education:  OT ed pt on OT role & POC as well as discharge recommendations.  OT educated patient on energy conservation techniques to reduce demand on cardiovascular system with performance of ADL tasks.       Patient left HOB elevated with all lines intact, call button in reach, and bed alarm on    GOALS:   Multidisciplinary Problems       Occupational Therapy Goals          Problem: Occupational Therapy    Goal Priority Disciplines Outcome Interventions   Occupational Therapy Goal     OT, PT/OT Ongoing, Progressing    Description: Goals to be met by: 8/19/2023     Patient will increase functional independence with ADLs by performing:    LE Dressing with Modified [place].  Grooming while standing at sink with Modified [place].  Toileting from toilet with Modified [place] for hygiene and clothing management.   Supine to sit with Modified [place].  Toilet transfer to toilet with Modified [place].                         History:     Past Medical History:   Diagnosis Date    Anxiety     Chronic neck pain     Depression     Hypertension     Osteoporosis     Seasonal allergies     TIA (transient ischemic attack)          Past Surgical History:   Procedure Laterality Date    HYSTERECTOMY      INSERTION OF PACEMAKER      NASAL POLYP SURGERY Bilateral     NECK SURGERY         Time Tracking:     OT Date of Treatment: 07/22/23  OT Start Time: 0938  OT Stop  Time: 1007  OT Total Time (min): 29 min    Billable Minutes:Evaluation 5  Self Care/Home Management 24    7/22/2023

## 2023-07-22 NOTE — ASSESSMENT & PLAN NOTE
Mildly elevated. Stable. EKG shows TWIs at lateral leads. TTE unremarkable.  -Continue telemetry

## 2023-07-22 NOTE — PLAN OF CARE
Problem: Occupational Therapy  Goal: Occupational Therapy Goal  Description: Goals to be met by: 8/19/2023     Patient will increase functional independence with ADLs by performing:    LE Dressing with Modified [place].  Grooming while standing at sink with Modified [place].  Toileting from toilet with Modified [place] for hygiene and clothing management.   Supine to sit with Modified [place].  Toilet transfer to toilet with Modified [place].    Outcome: Ongoing, Progressing

## 2023-07-23 PROCEDURE — 25000003 PHARM REV CODE 250: Performed by: STUDENT IN AN ORGANIZED HEALTH CARE EDUCATION/TRAINING PROGRAM

## 2023-07-23 PROCEDURE — 63600175 PHARM REV CODE 636 W HCPCS: Performed by: STUDENT IN AN ORGANIZED HEALTH CARE EDUCATION/TRAINING PROGRAM

## 2023-07-23 PROCEDURE — 25000003 PHARM REV CODE 250

## 2023-07-23 PROCEDURE — 21400001 HC TELEMETRY ROOM

## 2023-07-23 PROCEDURE — 25000003 PHARM REV CODE 250: Performed by: INTERNAL MEDICINE

## 2023-07-23 RX ADMIN — PSYLLIUM HUSK 1 PACKET: 3.4 POWDER ORAL at 09:07

## 2023-07-23 RX ADMIN — MORPHINE SULFATE 2 MG: 2 INJECTION, SOLUTION INTRAMUSCULAR; INTRAVENOUS at 03:07

## 2023-07-23 RX ADMIN — HYDROCODONE BITARTRATE AND ACETAMINOPHEN 1 TABLET: 5; 325 TABLET ORAL at 09:07

## 2023-07-23 RX ADMIN — TRAZODONE HYDROCHLORIDE 50 MG: 50 TABLET ORAL at 09:07

## 2023-07-23 RX ADMIN — ATORVASTATIN CALCIUM 10 MG: 10 TABLET, FILM COATED ORAL at 08:07

## 2023-07-23 RX ADMIN — DILTIAZEM HYDROCHLORIDE 120 MG: 120 CAPSULE, COATED, EXTENDED RELEASE ORAL at 08:07

## 2023-07-23 RX ADMIN — METOPROLOL TARTRATE 12.5 MG: 25 TABLET, FILM COATED ORAL at 09:07

## 2023-07-23 RX ADMIN — HYDROCODONE BITARTRATE AND ACETAMINOPHEN 1 TABLET: 5; 325 TABLET ORAL at 08:07

## 2023-07-23 RX ADMIN — METOPROLOL TARTRATE 12.5 MG: 25 TABLET, FILM COATED ORAL at 08:07

## 2023-07-23 RX ADMIN — HYDROCODONE BITARTRATE AND ACETAMINOPHEN 1 TABLET: 5; 325 TABLET ORAL at 12:07

## 2023-07-23 RX ADMIN — POLYETHYLENE GLYCOL 3350 17 G: 17 POWDER, FOR SOLUTION ORAL at 08:07

## 2023-07-23 NOTE — SUBJECTIVE & OBJECTIVE
Interval History:     Review of Systems   Constitutional:  Positive for fatigue. Negative for activity change and appetite change.   HENT:  Negative for congestion and dental problem.    Eyes:  Negative for discharge and itching.   Respiratory:  Negative for shortness of breath.    Cardiovascular:  Negative for chest pain.   Gastrointestinal:  Negative for abdominal distention and abdominal pain.   Endocrine: Negative for cold intolerance.   Genitourinary:  Negative for difficulty urinating and dysuria.   Musculoskeletal:  Negative for arthralgias and back pain.   Skin:  Negative for color change.   Neurological:  Negative for dizziness and facial asymmetry.   Hematological:  Negative for adenopathy.   Psychiatric/Behavioral:  Negative for agitation and behavioral problems.    Objective:     Vital Signs (Most Recent):  Temp: 97.9 °F (36.6 °C) (07/22/23 1935)  Pulse: 94 (07/22/23 1935)  Resp: 18 (07/22/23 1935)  BP: (!) 165/80 (95) (07/22/23 1935)  SpO2: 98 % (07/22/23 1935) Vital Signs (24h Range):  Temp:  [97.9 °F (36.6 °C)-98.9 °F (37.2 °C)] 97.9 °F (36.6 °C)  Pulse:  [] 94  Resp:  [18-20] 18  SpO2:  [93 %-98 %] 98 %  BP: (113-165)/(66-83) 165/80     Weight: 45.2 kg (99 lb 10.4 oz)  Body mass index is 17.1 kg/m².    Intake/Output Summary (Last 24 hours) at 7/22/2023 2014  Last data filed at 7/22/2023 1901  Gross per 24 hour   Intake 370 ml   Output 650 ml   Net -280 ml         Physical Exam  Vitals and nursing note reviewed.   Constitutional:       General: She is not in acute distress.  HENT:      Head: Atraumatic.      Right Ear: External ear normal.      Left Ear: External ear normal.      Nose: Nose normal.      Mouth/Throat:      Mouth: Mucous membranes are moist.   Cardiovascular:      Rate and Rhythm: Normal rate.   Pulmonary:      Effort: Pulmonary effort is normal.   Musculoskeletal:         General: Normal range of motion.      Cervical back: Normal range of motion.   Skin:     General: Skin is  warm.   Neurological:      Mental Status: She is alert and oriented to person, place, and time.   Psychiatric:         Behavior: Behavior normal.           Significant Labs: All pertinent labs within the past 24 hours have been reviewed.  CBC:   Recent Labs   Lab 07/21/23  0448   WBC 8.58   HGB 9.5*   HCT 30.0*        CMP:   Recent Labs   Lab 07/21/23 0448   *   K 3.7   CL 98   CO2 29      BUN 15   CREATININE 0.5   CALCIUM 8.5*   PROT 5.6*   ALBUMIN 2.9*   BILITOT 0.6   ALKPHOS 58   AST 17   ALT 16   ANIONGAP 8       Significant Imaging: I have reviewed all pertinent imaging results/findings within the past 24 hours.

## 2023-07-23 NOTE — PROGRESS NOTES
[facility] Medicine  Progress Note    Patient Name: Alvina Guallpa  MRN: [id number]  Patient Class: IP- Inpatient   Admission Date: 7/13/2023  Length of Stay: 9 days  Attending Physician: Philip Quinonez MD  Primary Care Provider: EDY Hurd        Subjective:     Principal Problem:Traumatic hemopneumothorax, initial encounter        HPI:  Patient is an 83-year-old female with a history of AFib, hypertension, hyperlipidemia, scoliosis with multiple chronic spinal degenerative changes with chronic back pain, history of pelvic fracture and hip fracture in the past who presents after tripping and falling at home.  She was recently replaced on Eliquis by her cardiologist due to episodes of AFib.  She tripped and fell at home onto her right side and developed severe pain in her side.  She went to the ER at [place] and was evaluated and found to have rib fractures at level 8 and 9 in addition to what appeared to be multiple chronic spinal compression fractures on CT imaging.  CT of the chest reveals hemopneumothorax with small apical pneumothorax.  She was admitted there and monitored with repeat CT scan today showing enlarging hemopneumothorax.    Additionally, she was noted to have EKG changes with mildly elevated troponin though her troponin level trended down.  She is not having any chest pain.  She had an episode of hypotension that improved with IV fluids.    She was transferred per  transfer to [facility] for further evaluation.  EN route, per Pulmonary request the patient was redirected to the ER for further evaluation and chest tube placement.    On arrival in the ED:  The patient's vital signs are stable.  She is mildly tachycardic with heart rate in the low 100s.  She is comfortable and easily communicating.  She says she has back pain that is similar to her chronic back pain.  She has mild right rib pain.  Her pelvis is without pain at this time.  She has no other  complaints.  She is breathing easily.  I discussed with the ED provider and Dr. Baker has graciously assisted with placement of chest tube.  Additionally, Dr Baker discussed with Dr Weiss who will consult for any potential need of surgical involvement and will assist with management of the chest tube.  I discussed with Dr. Carrasco and with Dr. Dhaliwal from the Pulmonary team regarding further management.  The patient will be moved to the ICU and will complete trauma workup with scans once chest tube is placed.    See referring provider note below.    83-year-old woman with PMH HTN, TIA (on Eliquis), pacemaker, and osteoporosis adm to  on 07/12 after falling and hitting her right side on her wheelchair.  Patient said that she tripped.  There was no LOC and she did not hit her head.  ROS pos for right-sided chest wall pain.       On admission /93, HR 95.  Exam was pos for bruising on the right chest and arms and impaired recent memory.  Labs:  WBC 13.1, Hb 11.7, Na 131, K 4.9, Cr 1.2.      CT chest WO contrast pos for acute fractures of right ribs 8 -9 with small pneumothorax and extensive subcutaneous emphysema.  Small right hemothorax, right lower lung contusion/hematoma.  Also,, extensive compression deformities throughout the thoracic spine of uncertain chronicity.      EKG NSR notable for small T-wave inversion V2-4 which were not present on an earlier EKG (03/2020).      Patient became hypotensive this morning on her way to radiology for a repeat CT chest.  BP improved when patient placed in Trendelenburg and given IVF.      Repeat ECG  (829 h) pos for deep T-wave inversion in V2-6 which was not present on the earlier ECG. Troponin 0.037 > 0.013.      CT chest today pos for interval increase in right pleural effusion/ hemothorax with no significant change in small pneumothorax.  Also, increasing atelectasis of the right lower lobe obscuring the previously described hematoma/ contusion.  Also, mild  atelectasis on the left.       Patient complained of left hip pain this morning.  X-ray pos for left superior pubic ramus fracture of uncertain chronicity possibly acute.  also,, old intertrochanteric left hip fracture s/p ORIF.  Severe bony demineralization and degenerative changes noted.     VS (12 18 h) /62, , RR 15, SpO2 100% (2 L).  Labs WBC 13.1 > 10.2, Hb 11.7 > 9.7, Na 131> 132, Cr 1.2 > 0.8     Transfer requested for higher level of care.  Transfer diagnosis multiple right-sided rib fractures, right pulmonary contusion, right apical pneumothorax (small), rt > lt pleural effusion, left pubic ramus fracture of uncertain chronicity, EKG changes concerning for ischemia      Overview/Hospital Course:  Alvina Guallpa is an 83 year old female with a past medical history of Afib on Xarelto, cardiac pacemaker, HTN, anemia, HLD, and osteopenia with chronic thoracic, lumbar, left femur and pelvic fractures who presented with a mechanical fall leading to right sided 8th and 9th rib fractures with a hemopneumothorax. She had a chest tube placed in the ED upon transfer from [place]. Her Hgb fell to 7 as there was significant output from the chest tube once placed. She received one unit of FFP and one unit of PRBCs. Home Xarelto is currently held. Hgb is being trended and has improved; output from the chest tube is also decreasing. Pulmonary is following. She is stable on room air currently. Daily serial CXRs show progressive improvement. Troponin is also being trended given mild elevation with TWIs at the lateral leads. Cardiology has been consulted and is considering stress testing and/or angiogram. Transfer orders to leave the ICU were placed 7/16. She is pending SNF placement once medically appropriate.    07/21  Pt confused  Having hospital delirium  Awaiting SNF     07/22  Mental status better  Awaiting SNF placement      Interval History:     Review of Systems   Constitutional:  Positive for fatigue.  Negative for activity change and appetite change.   HENT:  Negative for congestion and dental problem.    Eyes:  Negative for discharge and itching.   Respiratory:  Negative for shortness of breath.    Cardiovascular:  Negative for chest pain.   Gastrointestinal:  Negative for abdominal distention and abdominal pain.   Endocrine: Negative for cold intolerance.   Genitourinary:  Negative for difficulty urinating and dysuria.   Musculoskeletal:  Negative for arthralgias and back pain.   Skin:  Negative for color change.   Neurological:  Negative for dizziness and facial asymmetry.   Hematological:  Negative for adenopathy.   Psychiatric/Behavioral:  Negative for agitation and behavioral problems.    Objective:     Vital Signs (Most Recent):  Temp: 97.9 °F (36.6 °C) (07/22/23 1935)  Pulse: 94 (07/22/23 1935)  Resp: 18 (07/22/23 1935)  BP: (!) 165/80 (95) (07/22/23 1935)  SpO2: 98 % (07/22/23 1935) Vital Signs (24h Range):  Temp:  [97.9 °F (36.6 °C)-98.9 °F (37.2 °C)] 97.9 °F (36.6 °C)  Pulse:  [] 94  Resp:  [18-20] 18  SpO2:  [93 %-98 %] 98 %  BP: (113-165)/(66-83) 165/80     Weight: 45.2 kg (99 lb 10.4 oz)  Body mass index is 17.1 kg/m².    Intake/Output Summary (Last 24 hours) at 7/22/2023 2014  Last data filed at 7/22/2023 1901  Gross per 24 hour   Intake 370 ml   Output 650 ml   Net -280 ml         Physical Exam  Vitals and nursing note reviewed.   Constitutional:       General: She is not in acute distress.  HENT:      Head: Atraumatic.      Right Ear: External ear normal.      Left Ear: External ear normal.      Nose: Nose normal.      Mouth/Throat:      Mouth: Mucous membranes are moist.   Cardiovascular:      Rate and Rhythm: Normal rate.   Pulmonary:      Effort: Pulmonary effort is normal.   Musculoskeletal:         General: Normal range of motion.      Cervical back: Normal range of motion.   Skin:     General: Skin is warm.   Neurological:      Mental Status: She is alert and oriented to person,  place, and time.   Psychiatric:         Behavior: Behavior normal.           Significant Labs: All pertinent labs within the past 24 hours have been reviewed.  CBC:   Recent Labs   Lab 07/21/23  0448   WBC 8.58   HGB 9.5*   HCT 30.0*        CMP:   Recent Labs   Lab 07/21/23  0448   *   K 3.7   CL 98   CO2 29      BUN 15   CREATININE 0.5   CALCIUM 8.5*   PROT 5.6*   ALBUMIN 2.9*   BILITOT 0.6   ALKPHOS 58   AST 17   ALT 16   ANIONGAP 8       Significant Imaging: I have reviewed all pertinent imaging results/findings within the past 24 hours.      Assessment/Plan:      * Traumatic hemopneumothorax, initial encounter  Improved  Chest tube placed  in the ED now removed and site is clean dry and intact  Medically stable to go to SNF       Right lower lobe lung mass  -Continue to monitor  -Dedicated imaging later      Lumbar compression fracture  Chronic.  -PT/OT  -PRN analgesics      HLD (hyperlipidemia)  -Continue statin      Atrial fibrillation  -Telemetry  -Hold anticoagulation  -Continue diltiazem    Troponin level elevated  Mildly elevated. Stable. EKG shows TWIs at lateral leads. TTE unremarkable.  -Continue telemetry      Fall  -Fall precautions  -CM consulted for SNF placement  - pt suffers from orthostatic hypotension    Closed fracture of ramus of left pubis  Chronic.  - pending SNF placement      Compression fracture of body of thoracic vertebra  Chronic.  -PRN analgesics        Contusion of lung, closed, initial encounter  Secondary to fall.  -PRN analgesics      Acute posthemorrhagic anemia  Improving.  -S/p FFP and one unit PRBCs        Primary hypertension  -Hold home BP medications with exception of home diltiazem  -Continue to monitor      Cardiac pacemaker in situ  History of SSS. Stable.      Multiple closed fractures of ribs of right side  8th and 9th rib fracture. Stable.  -PRN analgesics      VTE Risk Mitigation (From admission, onward)         Ordered     Place NELL hose  Until  discontinued         07/20/23 1237     IP VTE HIGH RISK PATIENT  Once         07/13/23 1650     Place sequential compression device  Until discontinued         07/13/23 1650                Discharge Planning   QUIQUE: 7/24/2023     Code Status: Full Code   Is the patient medically ready for discharge?:     Reason for patient still in hospital (select all that apply): Pending disposition  Discharge Plan A: Skilled Nursing Facility   Discharge Delays: None known at this time              Philip Quinonez MD  Department of Hospital Medicine   [facility]

## 2023-07-23 NOTE — ASSESSMENT & PLAN NOTE
Improved  Chest tube placed  in the ED now removed and site is clean dry and intact  Medically stable to go to SNF

## 2023-07-24 PROCEDURE — 25000003 PHARM REV CODE 250

## 2023-07-24 PROCEDURE — 97535 SELF CARE MNGMENT TRAINING: CPT

## 2023-07-24 PROCEDURE — 97116 GAIT TRAINING THERAPY: CPT

## 2023-07-24 PROCEDURE — 21400001 HC TELEMETRY ROOM

## 2023-07-24 PROCEDURE — 25000003 PHARM REV CODE 250: Performed by: STUDENT IN AN ORGANIZED HEALTH CARE EDUCATION/TRAINING PROGRAM

## 2023-07-24 PROCEDURE — 25000003 PHARM REV CODE 250: Performed by: INTERNAL MEDICINE

## 2023-07-24 RX ADMIN — ATORVASTATIN CALCIUM 10 MG: 10 TABLET, FILM COATED ORAL at 08:07

## 2023-07-24 RX ADMIN — TRAZODONE HYDROCHLORIDE 50 MG: 50 TABLET ORAL at 09:07

## 2023-07-24 RX ADMIN — HYDROCODONE BITARTRATE AND ACETAMINOPHEN 1 TABLET: 5; 325 TABLET ORAL at 01:07

## 2023-07-24 RX ADMIN — METOPROLOL TARTRATE 12.5 MG: 25 TABLET, FILM COATED ORAL at 09:07

## 2023-07-24 RX ADMIN — DILTIAZEM HYDROCHLORIDE 120 MG: 120 CAPSULE, COATED, EXTENDED RELEASE ORAL at 08:07

## 2023-07-24 RX ADMIN — HYDROCODONE BITARTRATE AND ACETAMINOPHEN 1 TABLET: 5; 325 TABLET ORAL at 09:07

## 2023-07-24 RX ADMIN — POLYETHYLENE GLYCOL 3350 17 G: 17 POWDER, FOR SOLUTION ORAL at 08:07

## 2023-07-24 RX ADMIN — BACLOFEN 10 MG: 10 TABLET ORAL at 09:07

## 2023-07-24 RX ADMIN — METOPROLOL TARTRATE 12.5 MG: 25 TABLET, FILM COATED ORAL at 08:07

## 2023-07-24 RX ADMIN — HYDROCODONE BITARTRATE AND ACETAMINOPHEN 1 TABLET: 5; 325 TABLET ORAL at 08:07

## 2023-07-24 RX ADMIN — BACLOFEN 10 MG: 10 TABLET ORAL at 12:07

## 2023-07-24 NOTE — SUBJECTIVE & OBJECTIVE
Interval History:         Review of Systems   Constitutional:  Negative for activity change and appetite change.   HENT:  Negative for congestion and dental problem.    Eyes:  Negative for discharge and itching.   Respiratory:  Negative for shortness of breath.    Cardiovascular:  Negative for chest pain.   Gastrointestinal:  Negative for abdominal distention and abdominal pain.   Endocrine: Negative for cold intolerance.   Genitourinary:  Negative for difficulty urinating and dysuria.   Musculoskeletal:  Negative for arthralgias and back pain.   Skin:  Negative for color change.   Neurological:  Negative for dizziness and facial asymmetry.   Hematological:  Negative for adenopathy.   Psychiatric/Behavioral:  Negative for agitation and behavioral problems.    Objective:     Vital Signs (Most Recent):  Temp: 99 °F (37.2 °C) (07/23/23 1917)  Pulse: 100 (07/23/23 1917)  Resp: 19 (07/23/23 1917)  BP: (!) 154/82 (07/23/23 1917)  SpO2: 97 % (07/23/23 1917) Vital Signs (24h Range):  Temp:  [97.9 °F (36.6 °C)-99 °F (37.2 °C)] 99 °F (37.2 °C)  Pulse:  [] 100  Resp:  [17-20] 19  SpO2:  [92 %-97 %] 97 %  BP: (119-154)/(69-82) 154/82     Weight: 45.2 kg (99 lb 10.4 oz)  Body mass index is 17.1 kg/m².    Intake/Output Summary (Last 24 hours) at 7/23/2023 2024  Last data filed at 7/23/2023 1530  Gross per 24 hour   Intake 120 ml   Output 550 ml   Net -430 ml         Physical Exam  Vitals and nursing note reviewed.   Constitutional:       General: She is not in acute distress.  HENT:      Head: Atraumatic.      Right Ear: External ear normal.      Left Ear: External ear normal.      Nose: Nose normal.      Mouth/Throat:      Mouth: Mucous membranes are dry.   Cardiovascular:      Rate and Rhythm: Normal rate.   Pulmonary:      Effort: Pulmonary effort is normal.   Musculoskeletal:         General: Normal range of motion.      Cervical back: Normal range of motion.   Skin:     General: Skin is warm.   Neurological:       Mental Status: She is alert and oriented to person, place, and time.   Psychiatric:         Behavior: Behavior normal.           Significant Labs: All pertinent labs within the past 24 hours have been reviewed.  CBC: No results for input(s): WBC, HGB, HCT, PLT in the last 48 hours.  CMP: No results for input(s): NA, K, CL, CO2, GLU, BUN, CREATININE, CALCIUM, PROT, ALBUMIN, BILITOT, ALKPHOS, AST, ALT, ANIONGAP, EGFRNONAA in the last 48 hours.    Invalid input(s): ESTGFAFR[place]    Significant Imaging: I have reviewed all pertinent imaging results/findings within the past 24 hours.

## 2023-07-24 NOTE — PT/OT/SLP PROGRESS
Physical Therapy Treatment    Patient Name:  Alvina Guallpa   MRN:  [id number]    Recommendations:     Discharge Recommendations: nursing facility, skilled  Discharge Equipment Recommendations: to be determined by next level of care  Barriers to discharge:  high fall risk, decrease activity tolerance,     Assessment:     Alvina Guallpa is a 83 y.o. female admitted with a medical diagnosis of Traumatic hemopneumothorax, initial encounter.  She presents with the following impairments/functional limitations: weakness, impaired endurance, impaired self care skills, impaired functional mobility, gait instability, impaired balance, decreased lower extremity function, decreased safety awareness, pain, impaired cardiopulmonary response to activity.    Pt found in bed with HOB elevated. Pt agreeable to visit. Pt ambulated 75 ft x 2 with RW and CGA. No loss of balance or shortness of breath but limited due to decrease activity tolerance.     Rehab Prognosis: Fair; patient would benefit from acute skilled PT services to address these deficits and reach maximum level of function.    Recent Surgery: * No surgery found *      Plan:     During this hospitalization, patient to be seen 6 x/week to address the identified rehab impairments via gait training, therapeutic activities, therapeutic exercises, neuromuscular re-education and progress toward the following goals:    Plan of Care Expires:  08/24/23    Subjective     Chief Complaint: none   Patient/Family Comments/goals: go to SNF  Pain/Comfort:  Pain Rating 1: 0/10      Objective:     Communicated with Rn prior to session.  Patient found HOB elevated with bed alarm, telemetry, peripheral IV upon PT entry to room.     General Precautions: Standard, fall  Orthopedic Precautions: N/A  Braces: N/A  Respiratory Status: Room air     Functional Mobility:  Bed Mobility:     Supine to Sit: contact guard assistance  Sit to Supine: contact guard assistance  Transfers:     Sit to  Stand:  contact guard assistance with rolling walker  Gait: 75 ft x 2 with RW and CGA      AM-PAC 6 CLICK MOBILITY          Treatment & Education:  Pt educated on POC, discharge recommendation, importance of time OOB, pacing/energy conservation, need for assist with mobility, use of call bell to seek assistance as needed and fall prevention      Patient left HOB elevated with all lines intact, call button in reach, and chair alarm on..    GOALS:   Multidisciplinary Problems       Physical Therapy Goals          Problem: Physical Therapy    Goal Priority Disciplines Outcome Goal Variances Interventions   Physical Therapy Goal     PT, PT/OT Ongoing, Progressing     Description: Goals to be met by: 8/15/2023     Patient will increase functional independence with mobility by performin. Supine to sit with Stand-by Assistance  2. Sit to stand transfer with Stand-by Assistance  3. Gait  x 150 feet with Stand-by Assistance using Rolling Walker.                          Time Tracking:     PT Received On: 23  PT Start Time: 937     PT Stop Time: 945  PT Total Time (min): 8 min     Billable Minutes: Gait Training 8    Treatment Type: Treatment  PT/PTA: PT     Number of PTA visits since last PT visit: 0     2023

## 2023-07-24 NOTE — PLAN OF CARE
PRAVEEN messaged Fadia Ly at [facility] to inquire about TCU acceptance.    0948[facility] is submitting for auth this am.PRAVEEN contacted pt's grandsonJhoan to update on discharge plan. Pt and Jhoan both agree with SNF/TCU.    1315- CM called Germaine and s/w Yony. SNF auth is still under review.

## 2023-07-24 NOTE — PT/OT/SLP PROGRESS
Occupational Therapy   Treatment    Name: Alvina Guallpa  MRN: [id number]  Admitting Diagnosis:  Traumatic hemopneumothorax, initial encounter       Recommendations:     Discharge Recommendations: nursing facility, skilled  Discharge Equipment Recommendations:  other (see comments) (TBD at next level of care)  Barriers to discharge:  None    Assessment:     Alvina Guallpa is a 83 y.o. female with a medical diagnosis of Traumatic hemopneumothorax, initial encounter. Performance deficits affecting function are weakness, impaired endurance, impaired self care skills, impaired functional mobility, gait instability, decreased safety awareness, impaired cardiopulmonary response to activity, impaired balance.She reports she is open to SNF placement prior to returning home. Grandson is primary caregiver. She reports less dizziness today.     Rehab Prognosis:  Fair; patient would benefit from acute skilled OT services to address these deficits and reach maximum level of function.       Plan:     Patient to be seen 5 x/week to address the above listed problems via self-care/home management, therapeutic activities, therapeutic exercises  Plan of Care Expires: 08/19/23  Plan of Care Reviewed with: patient    Subjective     Chief Complaint: no new complaints  Patient/Family Comments/goals: return home  Pain/Comfort:  Pain Rating 1: 0/10  Pain Rating Post-Intervention 1: 0/10    Objective:     Communicated with: nurse prior to session.  Patient found HOB elevated with bed alarm, PureWick, telemetry, peripheral IV upon OT entry to room.    General Precautions: Standard, fall    Orthopedic Precautions:N/A  Braces: N/A  Respiratory Status: Room air     Occupational Performance:     Bed Mobility:    Patient completed Rolling/Turning to Left with  contact guard assistance  Patient completed Scooting/Bridging with contact guard assistance  Patient completed Supine to Sit with contact guard assistance     Activities of Daily  Living:  Grooming: stand by assistance/setup for oral care and washing face while seated at edge of bed    Treatment & Education:  Patient was educated on safety during transfers, ADLs and functional mobility, reviewed discharge planning and equipment needs and instructed in use of call bell for assistance.    Patient left HOB elevated with all lines intact and call button in reach    GOALS:   Multidisciplinary Problems       Occupational Therapy Goals          Problem: Occupational Therapy    Goal Priority Disciplines Outcome Interventions   Occupational Therapy Goal     OT, PT/OT Ongoing, Progressing    Description: Goals to be met by: 8/19/2023     Patient will increase functional independence with ADLs by performing:    LE Dressing with Modified [place].  Grooming while standing at sink with Modified [place].  Toileting from toilet with Modified [place] for hygiene and clothing management.   Supine to sit with Modified [place].  Toilet transfer to toilet with Modified [place].                         Time Tracking:     OT Date of Treatment: 07/24/23  OT Start Time: 1035  OT Stop Time: 1050  OT Total Time (min): 15 min    Billable Minutes:Self Care/Home Management 15    OT/DANIEL: OT          7/24/2023

## 2023-07-24 NOTE — PROGRESS NOTES
[facility] Medicine  Progress Note    Patient Name: Alvina Guallpa  MRN: [id number]  Patient Class: IP- Inpatient   Admission Date: 7/13/2023  Length of Stay: 10 days  Attending Physician: Philip Quinonez MD  Primary Care Provider: EDY Hurd        Subjective:     Principal Problem:Traumatic hemopneumothorax, initial encounter        HPI:  Patient is an 83-year-old female with a history of AFib, hypertension, hyperlipidemia, scoliosis with multiple chronic spinal degenerative changes with chronic back pain, history of pelvic fracture and hip fracture in the past who presents after tripping and falling at home.  She was recently replaced on Eliquis by her cardiologist due to episodes of AFib.  She tripped and fell at home onto her right side and developed severe pain in her side.  She went to the ER at [facility] and was evaluated and found to have rib fractures at level 8 and 9 in addition to what appeared to be multiple chronic spinal compression fractures on CT imaging.  CT of the chest reveals hemopneumothorax with small apical pneumothorax.  She was admitted there and monitored with repeat CT scan today showing enlarging hemopneumothorax.    Additionally, she was noted to have EKG changes with mildly elevated troponin though her troponin level trended down.  She is not having any chest pain.  She had an episode of hypotension that improved with IV fluids.    She was transferred per  transfer to [facility] for further evaluation.  EN route, per Pulmonary request the patient was redirected to the ER for further evaluation and chest tube placement.    On arrival in the ED:  The patient's vital signs are stable.  She is mildly tachycardic with heart rate in the low 100s.  She is comfortable and easily communicating.  She says she has back pain that is similar to her chronic back pain.  She has mild right rib pain.  Her pelvis is without pain at this time.  She has no other  complaints.  She is breathing easily.  I discussed with the ED provider and Dr. Baker has graciously assisted with placement of chest tube.  Additionally, Dr Baker discussed with Dr Weiss who will consult for any potential need of surgical involvement and will assist with management of the chest tube.  I discussed with Dr. Carrasco and with Dr. Dhaliwal from the Pulmonary team regarding further management.  The patient will be moved to the ICU and will complete trauma workup with scans once chest tube is placed.    See referring provider note below.    83-year-old woman with PMH HTN, TIA (on Eliquis), pacemaker, and osteoporosis adm to  on 07/12 after falling and hitting her right side on her wheelchair.  Patient said that she tripped.  There was no LOC and she did not hit her head.  ROS pos for right-sided chest wall pain.       On admission /93, HR 95.  Exam was pos for bruising on the right chest and arms and impaired recent memory.  Labs:  WBC 13.1, Hb 11.7, Na 131, K 4.9, Cr 1.2.      CT chest WO contrast pos for acute fractures of right ribs 8 -9 with small pneumothorax and extensive subcutaneous emphysema.  Small right hemothorax, right lower lung contusion/hematoma.  Also,, extensive compression deformities throughout the thoracic spine of uncertain chronicity.      EKG NSR notable for small T-wave inversion V2-4 which were not present on an earlier EKG (03/2020).      Patient became hypotensive this morning on her way to radiology for a repeat CT chest.  BP improved when patient placed in Trendelenburg and given IVF.      Repeat ECG  (829 h) pos for deep T-wave inversion in V2-6 which was not present on the earlier ECG. Troponin 0.037 > 0.013.      CT chest today pos for interval increase in right pleural effusion/ hemothorax with no significant change in small pneumothorax.  Also, increasing atelectasis of the right lower lobe obscuring the previously described hematoma/ contusion.  Also, mild  atelectasis on the left.       Patient complained of left hip pain this morning.  X-ray pos for left superior pubic ramus fracture of uncertain chronicity possibly acute.  also,, old intertrochanteric left hip fracture s/p ORIF.  Severe bony demineralization and degenerative changes noted.     VS (12 18 h) /62, , RR 15, SpO2 100% (2 L).  Labs WBC 13.1 > 10.2, Hb 11.7 > 9.7, Na 131> 132, Cr 1.2 > 0.8     Transfer requested for higher level of care.  Transfer diagnosis multiple right-sided rib fractures, right pulmonary contusion, right apical pneumothorax (small), rt > lt pleural effusion, left pubic ramus fracture of uncertain chronicity, EKG changes concerning for ischemia      Overview/Hospital Course:  Alvina Guallpa is an 83 year old female with a past medical history of Afib on Xarelto, cardiac pacemaker, HTN, anemia, HLD, and osteopenia with chronic thoracic, lumbar, left femur and pelvic fractures who presented with a mechanical fall leading to right sided 8th and 9th rib fractures with a hemopneumothorax. She had a chest tube placed in the ED upon transfer from [place]. Her Hgb fell to 7 as there was significant output from the chest tube once placed. She received one unit of FFP and one unit of PRBCs. Home Xarelto is currently held. Hgb is being trended and has improved; output from the chest tube is also decreasing. Pulmonary is following. She is stable on room air currently. Daily serial CXRs show progressive improvement. Troponin is also being trended given mild elevation with TWIs at the lateral leads. Cardiology has been consulted and is considering stress testing and/or angiogram. Transfer orders to leave the ICU were placed 7/16. She is pending SNF placement once medically appropriate.    07/21  Pt confused  Having hospital delirium  Awaiting SNF     07/22  Mental status better  Awaiting SNF placement    07/23  Medically stable  Awaiting SNF      Interval History:         Review of Systems    Constitutional:  Negative for activity change and appetite change.   HENT:  Negative for congestion and dental problem.    Eyes:  Negative for discharge and itching.   Respiratory:  Negative for shortness of breath.    Cardiovascular:  Negative for chest pain.   Gastrointestinal:  Negative for abdominal distention and abdominal pain.   Endocrine: Negative for cold intolerance.   Genitourinary:  Negative for difficulty urinating and dysuria.   Musculoskeletal:  Negative for arthralgias and back pain.   Skin:  Negative for color change.   Neurological:  Negative for dizziness and facial asymmetry.   Hematological:  Negative for adenopathy.   Psychiatric/Behavioral:  Negative for agitation and behavioral problems.    Objective:     Vital Signs (Most Recent):  Temp: 99 °F (37.2 °C) (07/23/23 1917)  Pulse: 100 (07/23/23 1917)  Resp: 19 (07/23/23 1917)  BP: (!) 154/82 (07/23/23 1917)  SpO2: 97 % (07/23/23 1917) Vital Signs (24h Range):  Temp:  [97.9 °F (36.6 °C)-99 °F (37.2 °C)] 99 °F (37.2 °C)  Pulse:  [] 100  Resp:  [17-20] 19  SpO2:  [92 %-97 %] 97 %  BP: (119-154)/(69-82) 154/82     Weight: 45.2 kg (99 lb 10.4 oz)  Body mass index is 17.1 kg/m².    Intake/Output Summary (Last 24 hours) at 7/23/2023 2024  Last data filed at 7/23/2023 1530  Gross per 24 hour   Intake 120 ml   Output 550 ml   Net -430 ml         Physical Exam  Vitals and nursing note reviewed.   Constitutional:       General: She is not in acute distress.  HENT:      Head: Atraumatic.      Right Ear: External ear normal.      Left Ear: External ear normal.      Nose: Nose normal.      Mouth/Throat:      Mouth: Mucous membranes are dry.   Cardiovascular:      Rate and Rhythm: Normal rate.   Pulmonary:      Effort: Pulmonary effort is normal.   Musculoskeletal:         General: Normal range of motion.      Cervical back: Normal range of motion.   Skin:     General: Skin is warm.   Neurological:      Mental Status: She is alert and oriented to  person, place, and time.   Psychiatric:         Behavior: Behavior normal.           Significant Labs: All pertinent labs within the past 24 hours have been reviewed.  CBC: No results for input(s): WBC, HGB, HCT, PLT in the last 48 hours.  CMP: No results for input(s): NA, K, CL, CO2, GLU, BUN, CREATININE, CALCIUM, PROT, ALBUMIN, BILITOT, ALKPHOS, AST, ALT, ANIONGAP, EGFRNONAA in the last 48 hours.    Invalid input(s): ESTGFAFR[place]    Significant Imaging: I have reviewed all pertinent imaging results/findings within the past 24 hours.      Assessment/Plan:      * Traumatic hemopneumothorax, initial encounter  Improved  Chest tube placed  in the ED now removed and site is clean dry and intact  Medically stable to go to SNF       Right lower lobe lung mass  -Continue to monitor  -Dedicated imaging later      Lumbar compression fracture  Chronic.  -PT/OT  -PRN analgesics      HLD (hyperlipidemia)  -Continue statin      Atrial fibrillation  -Telemetry  -Hold anticoagulation  -Continue diltiazem    Troponin level elevated  Mildly elevated. Stable. EKG shows TWIs at lateral leads. TTE unremarkable.  -Continue telemetry      Fall  -Fall precautions  -CM consulted for SNF placement  - pt suffers from orthostatic hypotension    Closed fracture of ramus of left pubis  Chronic.  - pending SNF placement      Compression fracture of body of thoracic vertebra  Chronic.  -PRN analgesics        Contusion of lung, closed, initial encounter  Secondary to fall.  -PRN analgesics      Acute posthemorrhagic anemia  Improving.  -S/p FFP and one unit PRBCs        Primary hypertension  -Hold home BP medications with exception of home diltiazem  -Continue to monitor      Cardiac pacemaker in situ  History of SSS. Stable.      Multiple closed fractures of ribs of right side  8th and 9th rib fracture. Stable.  -PRN analgesics        VTE Risk Mitigation (From admission, onward)         Ordered     Place NELL hose  Until discontinued          07/20/23 1237     IP VTE HIGH RISK PATIENT  Once         07/13/23 1650     Place sequential compression device  Until discontinued         07/13/23 1650                Discharge Planning   QUIQUE: 7/24/2023     Code Status: Full Code   Is the patient medically ready for discharge?:     Reason for patient still in hospital (select all that apply): Pending disposition  Discharge Plan A: Skilled Nursing Facility   Discharge Delays: None known at this time              Philip Quinonez MD  Department of Hospital Medicine   [facility]

## 2023-07-25 VITALS
HEART RATE: 94 BPM | BODY MASS INDEX: 17.01 KG/M2 | RESPIRATION RATE: 16 BRPM | HEIGHT: 64 IN | TEMPERATURE: 99 F | WEIGHT: 99.63 LBS | DIASTOLIC BLOOD PRESSURE: 64 MMHG | SYSTOLIC BLOOD PRESSURE: 121 MMHG | OXYGEN SATURATION: 96 %

## 2023-07-25 PROCEDURE — 25000003 PHARM REV CODE 250: Performed by: STUDENT IN AN ORGANIZED HEALTH CARE EDUCATION/TRAINING PROGRAM

## 2023-07-25 PROCEDURE — 97116 GAIT TRAINING THERAPY: CPT | Mod: CQ

## 2023-07-25 PROCEDURE — 30200315 PPD INTRADERMAL TEST REV CODE 302: Performed by: INTERNAL MEDICINE

## 2023-07-25 PROCEDURE — 25000003 PHARM REV CODE 250: Performed by: INTERNAL MEDICINE

## 2023-07-25 PROCEDURE — 25000003 PHARM REV CODE 250

## 2023-07-25 PROCEDURE — 86580 TB INTRADERMAL TEST: CPT | Performed by: INTERNAL MEDICINE

## 2023-07-25 RX ORDER — DEXTROMETHORPHAN POLISTIREX 30 MG/5 ML
1 SUSPENSION, EXTENDED RELEASE 12 HR ORAL ONCE
Status: DISCONTINUED | OUTPATIENT
Start: 2023-07-25 | End: 2023-07-25 | Stop reason: HOSPADM

## 2023-07-25 RX ORDER — METOPROLOL TARTRATE 25 MG/1
12.5 TABLET, FILM COATED ORAL 2 TIMES DAILY
Qty: 90 TABLET | Refills: 0 | Status: SHIPPED | OUTPATIENT
Start: 2023-07-25 | End: 2023-10-23

## 2023-07-25 RX ADMIN — DILTIAZEM HYDROCHLORIDE 120 MG: 120 CAPSULE, COATED, EXTENDED RELEASE ORAL at 08:07

## 2023-07-25 RX ADMIN — TUBERCULIN PURIFIED PROTEIN DERIVATIVE 5 UNITS: 5 INJECTION, SOLUTION INTRADERMAL at 12:07

## 2023-07-25 RX ADMIN — METOPROLOL TARTRATE 12.5 MG: 25 TABLET, FILM COATED ORAL at 08:07

## 2023-07-25 RX ADMIN — POLYETHYLENE GLYCOL 3350 17 G: 17 POWDER, FOR SOLUTION ORAL at 08:07

## 2023-07-25 RX ADMIN — HYDROCODONE BITARTRATE AND ACETAMINOPHEN 1 TABLET: 5; 325 TABLET ORAL at 04:07

## 2023-07-25 RX ADMIN — ATORVASTATIN CALCIUM 10 MG: 10 TABLET, FILM COATED ORAL at 08:07

## 2023-07-25 RX ADMIN — HYDROCODONE BITARTRATE AND ACETAMINOPHEN 1 TABLET: 5; 325 TABLET ORAL at 08:07

## 2023-07-25 NOTE — PT/OT/SLP PROGRESS
"Physical Therapy Treatment    Patient Name:  Alvina Guallpa   MRN:  [id number]    Recommendations:     Discharge Recommendations: nursing facility, skilled  Discharge Equipment Recommendations: to be determined by next level of care  Barriers to discharge:  increased assist with mobility    Assessment:     Alvina Guallpa is a 83 y.o. female admitted with a medical diagnosis of Traumatic hemopneumothorax, initial encounter.  She presents with the following impairments/functional limitations: weakness, impaired endurance, impaired self care skills, impaired functional mobility, gait instability, impaired balance, decreased lower extremity function, decreased safety awareness, pain, impaired cardiopulmonary response to activity.    Pt agreeable to visit. Pt reports chronic pain from neck and down spine. Pt required contact guard assist for bed mobility and transfers with RW. Pt ambulated 100' with RW and contact guard assist. Pt with slow pacing and reports feeling "off" and a little lightheaded. Verbal cuing for RW proximity. Pt up in chair at end of session.    Rehab Prognosis: Fair; patient would benefit from acute skilled PT services to address these deficits and reach maximum level of function.    Recent Surgery: * No surgery found *      Plan:     During this hospitalization, patient to be seen 6 x/week to address the identified rehab impairments via gait training, therapeutic activities, therapeutic exercises, neuromuscular re-education and progress toward the following goals:    Plan of Care Expires:  08/24/23    Subjective     Chief Complaint: chronic pain  Patient/Family Comments/goals: to get better  Pain/Comfort:  Pain Rating 1: other (see comments) (not rated)  Location - Orientation 1: generalized  Location 1:  (neck and along spine)  Pain Addressed 1: Reposition, Distraction  Pain Rating Post-Intervention 1: other (see comments) (not rated)      Objective:     Communicated with RN prior to " session.  Patient found HOB elevated with bed alarm, telemetry, peripheral IV upon PT entry to room.     General Precautions: Standard, fall  Orthopedic Precautions: N/A  Braces: N/A  Respiratory Status: Room air     Functional Mobility:  Bed Mobility:     Supine to Sit: contact guard assistance  Transfers:     Sit to Stand:  contact guard assistance with rolling walker  Gait: x 100' with RW and CGA, slow pacing. VC for RW proximity.      AM-PAC 6 CLICK MOBILITY          Treatment & Education:  Pt educated on importance of time OOB, importance of intermittent mobility, safe techniques for transfers/ambulation, discharge recommendations/options, and use of call light for assistance and fall prevention.      Patient left up in chair with all lines intact, call button in reach, chair alarm on, and RN notified..    GOALS:   Multidisciplinary Problems       Physical Therapy Goals          Problem: Physical Therapy    Goal Priority Disciplines Outcome Goal Variances Interventions   Physical Therapy Goal     PT, PT/OT Ongoing, Progressing     Description: Goals to be met by: 8/15/2023     Patient will increase functional independence with mobility by performin. Supine to sit with Stand-by Assistance  2. Sit to stand transfer with Stand-by Assistance  3. Gait  x 150 feet with Stand-by Assistance using Rolling Walker.                          Time Tracking:     PT Received On: 23  PT Start Time: 1001     PT Stop Time: 1017  PT Total Time (min): 16 min     Billable Minutes: Gait Training 16    Treatment Type: Treatment  PT/PTA: PTA     Number of PTA visits since last PT visit: 2023

## 2023-07-25 NOTE — PLAN OF CARE
Patient cleared for discharge from case management standpoint.    Yarely, [facility], liaison pulled dc orders from Epic. Nurse Briscoe called report to Nurse Watson at [facility]. Pt will discharge with [facility], eta 5 pm.    CM called Neal ([facility]) notified of discharge today to [facility]. Neal is in agreement with plan of care.    Chart and discharge orders reviewed.  Patient discharged to [facility] with no further case management needs.       07/25/23 1139   Final Note   Assessment Type Final Discharge Note   Anticipated Discharge Disposition [facility]   Hospital Resources/Appts/Education Provided Provided patient/caregiver with written discharge plan information;Appointments scheduled and added to AVS   Post-Acute Status   Post-Acute Authorization Placement   Post-Acute Placement Status Set-up Complete/Auth obtained   Discharge Delays None known at this time

## 2023-07-25 NOTE — PROGRESS NOTES
[facility] Medicine  Progress Note    Patient Name: Alvina Guallpa  MRN: [id number]  Patient Class: IP- Inpatient   Admission Date: 7/13/2023  Length of Stay: 11 days  Attending Physician: Philip Quinonez MD  Primary Care Provider: EDY Hurd        Subjective:     Principal Problem:Traumatic hemopneumothorax, initial encounter        HPI:  Patient is an 83-year-old female with a history of AFib, hypertension, hyperlipidemia, scoliosis with multiple chronic spinal degenerative changes with chronic back pain, history of pelvic fracture and hip fracture in the past who presents after tripping and falling at home.  She was recently replaced on Eliquis by her cardiologist due to episodes of AFib.  She tripped and fell at home onto her right side and developed severe pain in her side.  She went to the ER at [place] and was evaluated and found to have rib fractures at level 8 and 9 in addition to what appeared to be multiple chronic spinal compression fractures on CT imaging.  CT of the chest reveals hemopneumothorax with small apical pneumothorax.  She was admitted there and monitored with repeat CT scan today showing enlarging hemopneumothorax.    Additionally, she was noted to have EKG changes with mildly elevated troponin though her troponin level trended down.  She is not having any chest pain.  She had an episode of hypotension that improved with IV fluids.    She was transferred per  transfer to [facility] for further evaluation.  EN route, per Pulmonary request the patient was redirected to the ER for further evaluation and chest tube placement.    On arrival in the ED:  The patient's vital signs are stable.  She is mildly tachycardic with heart rate in the low 100s.  She is comfortable and easily communicating.  She says she has back pain that is similar to her chronic back pain.  She has mild right rib pain.  Her pelvis is without pain at this time.  She has no other  complaints.  She is breathing easily.  I discussed with the ED provider and Dr. Baker has graciously assisted with placement of chest tube.  Additionally, Dr Baker discussed with Dr Weiss who will consult for any potential need of surgical involvement and will assist with management of the chest tube.  I discussed with Dr. Carrasco and with Dr. Dhaliwal from the Pulmonary team regarding further management.  The patient will be moved to the ICU and will complete trauma workup with scans once chest tube is placed.    See referring provider note below.    83-year-old woman with PMH HTN, TIA (on Eliquis), pacemaker, and osteoporosis adm to  on 07/12 after falling and hitting her right side on her wheelchair.  Patient said that she tripped.  There was no LOC and she did not hit her head.  ROS pos for right-sided chest wall pain.       On admission /93, HR 95.  Exam was pos for bruising on the right chest and arms and impaired recent memory.  Labs:  WBC 13.1, Hb 11.7, Na 131, K 4.9, Cr 1.2.      CT chest WO contrast pos for acute fractures of right ribs 8 -9 with small pneumothorax and extensive subcutaneous emphysema.  Small right hemothorax, right lower lung contusion/hematoma.  Also,, extensive compression deformities throughout the thoracic spine of uncertain chronicity.      EKG NSR notable for small T-wave inversion V2-4 which were not present on an earlier EKG (03/2020).      Patient became hypotensive this morning on her way to radiology for a repeat CT chest.  BP improved when patient placed in Trendelenburg and given IVF.      Repeat ECG  (829 h) pos for deep T-wave inversion in V2-6 which was not present on the earlier ECG. Troponin 0.037 > 0.013.      CT chest today pos for interval increase in right pleural effusion/ hemothorax with no significant change in small pneumothorax.  Also, increasing atelectasis of the right lower lobe obscuring the previously described hematoma/ contusion.  Also, mild  atelectasis on the left.       Patient complained of left hip pain this morning.  X-ray pos for left superior pubic ramus fracture of uncertain chronicity possibly acute.  also,, old intertrochanteric left hip fracture s/p ORIF.  Severe bony demineralization and degenerative changes noted.     VS (12 18 h) /62, , RR 15, SpO2 100% (2 L).  Labs WBC 13.1 > 10.2, Hb 11.7 > 9.7, Na 131> 132, Cr 1.2 > 0.8     Transfer requested for higher level of care.  Transfer diagnosis multiple right-sided rib fractures, right pulmonary contusion, right apical pneumothorax (small), rt > lt pleural effusion, left pubic ramus fracture of uncertain chronicity, EKG changes concerning for ischemia      Overview/Hospital Course:  Alvina Guallpa is an 83 year old female with a past medical history of Afib on Xarelto, cardiac pacemaker, HTN, anemia, HLD, and osteopenia with chronic thoracic, lumbar, left femur and pelvic fractures who presented with a mechanical fall leading to right sided 8th and 9th rib fractures with a hemopneumothorax. She had a chest tube placed in the ED upon transfer from [place]. Her Hgb fell to 7 as there was significant output from the chest tube once placed. She received one unit of FFP and one unit of PRBCs. Home Xarelto is currently held. Hgb is being trended and has improved; output from the chest tube is also decreasing. Pulmonary is following. She is stable on room air currently. Daily serial CXRs show progressive improvement. Troponin is also being trended given mild elevation with TWIs at the lateral leads. Cardiology has been consulted and is considering stress testing and/or angiogram. Transfer orders to leave the ICU were placed 7/16. She is pending SNF placement once medically appropriate.    07/21  Pt confused  Having hospital delirium  Awaiting SNF     07/22  Mental status better  Awaiting SNF placement    07/23  Medically stable  Awaiting SNF    07/24  Still awaiting placement  Medically  stable for days       Interval History:         Review of Systems   Constitutional:  Negative for activity change and appetite change.   HENT:  Negative for congestion and dental problem.    Eyes:  Negative for discharge and itching.   Respiratory:  Negative for shortness of breath.    Cardiovascular:  Negative for chest pain.   Gastrointestinal:  Negative for abdominal distention and abdominal pain.   Endocrine: Negative for cold intolerance.   Genitourinary:  Negative for difficulty urinating and dysuria.   Musculoskeletal:  Negative for arthralgias and back pain.   Skin:  Negative for color change.   Neurological:  Negative for dizziness and facial asymmetry.   Hematological:  Negative for adenopathy.   Psychiatric/Behavioral:  Negative for agitation and behavioral problems.    Objective:     Vital Signs (Most Recent):  Temp: 98.6 °F (37 °C) (07/24/23 1913)  Pulse: 97 (07/24/23 1913)  Resp: 20 (07/24/23 1913)  BP: (!) 142/64 (07/24/23 1913)  SpO2: 98 % (07/24/23 1913) Vital Signs (24h Range):  Temp:  [98.1 °F (36.7 °C)-98.6 °F (37 °C)] 98.6 °F (37 °C)  Pulse:  [79-97] 97  Resp:  [18-20] 20  SpO2:  [92 %-98 %] 98 %  BP: (102-143)/(53-76) 142/64     Weight: 45.2 kg (99 lb 10.4 oz)  Body mass index is 17.1 kg/m².    Intake/Output Summary (Last 24 hours) at 7/24/2023 2106  Last data filed at 7/24/2023 1650  Gross per 24 hour   Intake 240 ml   Output 950 ml   Net -710 ml         Physical Exam  Vitals and nursing note reviewed.   Constitutional:       General: She is not in acute distress.  HENT:      Head: Atraumatic.      Right Ear: External ear normal.      Left Ear: External ear normal.      Nose: Nose normal.      Mouth/Throat:      Mouth: Mucous membranes are moist.   Cardiovascular:      Rate and Rhythm: Normal rate.   Pulmonary:      Effort: Pulmonary effort is normal.   Musculoskeletal:         General: Normal range of motion.      Cervical back: Normal range of motion.   Skin:     General: Skin is warm.    Neurological:      Mental Status: She is alert and oriented to person, place, and time.   Psychiatric:         Behavior: Behavior normal.           Significant Labs: All pertinent labs within the past 24 hours have been reviewed.  CBC: No results for input(s): WBC, HGB, HCT, PLT in the last 48 hours.  CMP: No results for input(s): NA, K, CL, CO2, GLU, BUN, CREATININE, CALCIUM, PROT, ALBUMIN, BILITOT, ALKPHOS, AST, ALT, ANIONGAP, EGFRNONAA in the last 48 hours.    Invalid input(s): ESTGFAFR[place]    Significant Imaging: I have reviewed all pertinent imaging results/findings within the past 24 hours.      Assessment/Plan:      * Traumatic hemopneumothorax, initial encounter  Improved  Chest tube placed  in the ED now removed and site is clean dry and intact  Medically stable to go to SNF       Right lower lobe lung mass  -Continue to monitor        Lumbar compression fracture  Chronic.  -PT/OT  -PRN analgesics      HLD (hyperlipidemia)  -Continue statin      Atrial fibrillation  -Telemetry  -Hold anticoagulation  -Continue diltiazem  -can be restarted on xarelto upon discharge from hospital    Troponin level elevated  Mildly elevated. Stable. EKG shows TWIs at lateral leads. TTE unremarkable.  -Continue telemetry      Fall  -Fall precautions  -CM consulted for SNF placement  - pt suffers from orthostatic hypotension    Closed fracture of ramus of left pubis  Chronic.  - pending SNF placement      Compression fracture of body of thoracic vertebra  Chronic.  -PRN analgesics  -pending SNF placement      Contusion of lung, closed, initial encounter  Secondary to fall.  -PRN analgesics  -pt suffers from postural BP drop       Acute posthemorrhagic anemia  Improving.  -S/p FFP and one unit PRBCs        Primary hypertension  Maintain present regime      Cardiac pacemaker in situ  History of SSS. Stable.      Multiple closed fractures of ribs of right side  8th and 9th rib fracture. Stable.  -PRN analgesics      VTE Risk  Mitigation (From admission, onward)         Ordered     Place NELL hose  Until discontinued         07/20/23 1237     IP VTE HIGH RISK PATIENT  Once         07/13/23 1650     Place sequential compression device  Until discontinued         07/13/23 1650                Discharge Planning   QUIQUE: 7/25/2023     Code Status: Full Code   Is the patient medically ready for discharge?:     Reason for patient still in hospital (select all that apply): Pending disposition  Discharge Plan A: Skilled Nursing Facility   Discharge Delays: None known at this time              Philip Quinonez MD  Department of Hospital Medicine   [facility]

## 2023-07-25 NOTE — NURSING
Report given to Shirley at [facility]. Transport set up for 1700. Will continue to monitor.

## 2023-07-25 NOTE — SUBJECTIVE & OBJECTIVE
Interval History:         Review of Systems   Constitutional:  Negative for activity change and appetite change.   HENT:  Negative for congestion and dental problem.    Eyes:  Negative for discharge and itching.   Respiratory:  Negative for shortness of breath.    Cardiovascular:  Negative for chest pain.   Gastrointestinal:  Negative for abdominal distention and abdominal pain.   Endocrine: Negative for cold intolerance.   Genitourinary:  Negative for difficulty urinating and dysuria.   Musculoskeletal:  Negative for arthralgias and back pain.   Skin:  Negative for color change.   Neurological:  Negative for dizziness and facial asymmetry.   Hematological:  Negative for adenopathy.   Psychiatric/Behavioral:  Negative for agitation and behavioral problems.    Objective:     Vital Signs (Most Recent):  Temp: 98.6 °F (37 °C) (07/24/23 1913)  Pulse: 97 (07/24/23 1913)  Resp: 20 (07/24/23 1913)  BP: (!) 142/64 (07/24/23 1913)  SpO2: 98 % (07/24/23 1913) Vital Signs (24h Range):  Temp:  [98.1 °F (36.7 °C)-98.6 °F (37 °C)] 98.6 °F (37 °C)  Pulse:  [79-97] 97  Resp:  [18-20] 20  SpO2:  [92 %-98 %] 98 %  BP: (102-143)/(53-76) 142/64     Weight: 45.2 kg (99 lb 10.4 oz)  Body mass index is 17.1 kg/m².    Intake/Output Summary (Last 24 hours) at 7/24/2023 2106  Last data filed at 7/24/2023 1650  Gross per 24 hour   Intake 240 ml   Output 950 ml   Net -710 ml         Physical Exam  Vitals and nursing note reviewed.   Constitutional:       General: She is not in acute distress.  HENT:      Head: Atraumatic.      Right Ear: External ear normal.      Left Ear: External ear normal.      Nose: Nose normal.      Mouth/Throat:      Mouth: Mucous membranes are moist.   Cardiovascular:      Rate and Rhythm: Normal rate.   Pulmonary:      Effort: Pulmonary effort is normal.   Musculoskeletal:         General: Normal range of motion.      Cervical back: Normal range of motion.   Skin:     General: Skin is warm.   Neurological:      Mental  Status: She is alert and oriented to person, place, and time.   Psychiatric:         Behavior: Behavior normal.           Significant Labs: All pertinent labs within the past 24 hours have been reviewed.  CBC: No results for input(s): WBC, HGB, HCT, PLT in the last 48 hours.  CMP: No results for input(s): NA, K, CL, CO2, GLU, BUN, CREATININE, CALCIUM, PROT, ALBUMIN, BILITOT, ALKPHOS, AST, ALT, ANIONGAP, EGFRNONAA in the last 48 hours.    Invalid input(s): ESTGFA[place]    Significant Imaging: I have reviewed all pertinent imaging results/findings within the past 24 hours.

## 2023-07-25 NOTE — PLAN OF CARE
Problem: Adult Inpatient Plan of Care  Goal: Plan of Care Review  Outcome: Met  Goal: Patient-Specific Goal (Individualized)  Outcome: Met  Goal: Absence of Hospital-Acquired Illness or Injury  Outcome: Met  Goal: Optimal Comfort and Wellbeing  Outcome: Met  Goal: Readiness for Transition of Care  Outcome: Met     Problem: Fall Injury Risk  Goal: Absence of Fall and Fall-Related Injury  Outcome: Met     Problem: Skin Injury Risk Increased  Goal: Skin Health and Integrity  Outcome: Met     Problem: Oral Intake Inadequate  Goal: Improved Oral Intake  Outcome: Met     Problem: Pain Acute  Goal: Acceptable Pain Control and Functional Ability  Outcome: Met

## 2023-07-25 NOTE — PT/OT/SLP PROGRESS
Occupational Therapy      Patient Name:  Alvina Guallpa   MRN:  [id number]    Patient not seen today secondary to Other (Comment) (pending d/c). Will follow-up next service date if d/c falls through.    7/25/2023

## 2023-07-25 NOTE — PLAN OF CARE
[facility] SNF. Fadia will let CM know when to Request discharge orders.    1029- Per Y[facility] bed available. CM requested discharge orders.    1129- Yarely notified DC aorders are in. PPD ordered. Per Neal escobedo, clothing and hearing aides are at bedside in a bag. CM informed Nurse Rachna to have pt dressed and send hearing aides with pt when she discharges to [facility].

## 2023-07-25 NOTE — ASSESSMENT & PLAN NOTE
-Telemetry  -Hold anticoagulation  -Continue diltiazem  -can be restarted on xarelto upon discharge from hospital

## 2023-07-25 NOTE — NURSING
Discharge instructions and med script sent with pt. Discharge in wheelchair to wheelchair van to [facility].

## 2023-07-25 NOTE — PLAN OF CARE
Problem: Adult Inpatient Plan of Care  Goal: Absence of Hospital-Acquired Illness or Injury  Outcome: Ongoing, Progressing  Goal: Optimal Comfort and Wellbeing  Outcome: Ongoing, Progressing  Goal: Readiness for Transition of Care  Outcome: Ongoing, Progressing     Problem: Fall Injury Risk  Goal: Absence of Fall and Fall-Related Injury  Outcome: Ongoing, Progressing     Problem: Skin Injury Risk Increased  Goal: Skin Health and Integrity  Outcome: Ongoing, Progressing     Problem: Pain Acute  Goal: Acceptable Pain Control and Functional Ability  Outcome: Ongoing, Progressing

## 2023-07-26 PROBLEM — Z75.8 DISCHARGE PLANNING ISSUES: Status: ACTIVE | Noted: 2023-07-26

## 2023-07-26 PROBLEM — Z71.89 ACP (ADVANCE CARE PLANNING): Status: ACTIVE | Noted: 2023-07-26

## 2023-07-26 PROBLEM — R53.81 PHYSICAL DECONDITIONING: Status: ACTIVE | Noted: 2023-07-26

## 2023-07-26 NOTE — DISCHARGE SUMMARY
Formerly [facility], [facility] Medicine  Discharge Summary      Patient Name: Alvina Guallpa  MRN: [id number]  [facility]: [id number]  Patient Class: IP- Inpatient  Admission Date: 7/13/2023  Hospital Length of Stay: 12 days  Discharge Date and Time:  07/25/2023 8:40 PM  Attending Physician: No att. providers found   Discharging Provider: Jeffry Browning MD  Primary Care Provider: EDY Hurd    Primary Care Team: Networked reference to record PCT     HPI:   Patient is an 83-year-old female with a history of AFib, hypertension, hyperlipidemia, scoliosis with multiple chronic spinal degenerative changes with chronic back pain, history of pelvic fracture and hip fracture in the past who presents after tripping and falling at home.  She was recently replaced on Eliquis by her cardiologist due to episodes of AFib.  She tripped and fell at home onto her right side and developed severe pain in her side.  She went to the ER at [facility] and was evaluated and found to have rib fractures at level 8 and 9 in addition to what appeared to be multiple chronic spinal compression fractures on CT imaging.  CT of the chest reveals hemopneumothorax with small apical pneumothorax.  She was admitted there and monitored with repeat CT scan today showing enlarging hemopneumothorax.    Additionally, she was noted to have EKG changes with mildly elevated troponin though her troponin level trended down.  She is not having any chest pain.  She had an episode of hypotension that improved with IV fluids.    She was transferred per  transfer to [facility] for further evaluation.  EN route, per Pulmonary request the patient was redirected to the ER for further evaluation and chest tube placement.    On arrival in the ED:  The patient's vital signs are stable.  She is mildly tachycardic with heart rate in the low 100s.  She is comfortable and easily communicating.  She says she has back pain that is similar to her chronic back  pain.  She has mild right rib pain.  Her pelvis is without pain at this time.  She has no other complaints.  She is breathing easily.  I discussed with the ED provider and Dr. Baker has graciously assisted with placement of chest tube.  Additionally, Dr Baker discussed with Dr Weiss who will consult for any potential need of surgical involvement and will assist with management of the chest tube.  I discussed with Dr. Carrasco and with Dr. Dhaliwal from the Pulmonary team regarding further management.  The patient will be moved to the ICU and will complete trauma workup with scans once chest tube is placed.    See referring provider note below.    83-year-old woman with PMH HTN, TIA (on Eliquis), pacemaker, and osteoporosis adm to  on 07/12 after falling and hitting her right side on her wheelchair.  Patient said that she tripped.  There was no LOC and she did not hit her head.  ROS pos for right-sided chest wall pain.       On admission /93, HR 95.  Exam was pos for bruising on the right chest and arms and impaired recent memory.  Labs:  WBC 13.1, Hb 11.7, Na 131, K 4.9, Cr 1.2.      CT chest WO contrast pos for acute fractures of right ribs 8 -9 with small pneumothorax and extensive subcutaneous emphysema.  Small right hemothorax, right lower lung contusion/hematoma.  Also,, extensive compression deformities throughout the thoracic spine of uncertain chronicity.      EKG NSR notable for small T-wave inversion V2-4 which were not present on an earlier EKG (03/2020).      Patient became hypotensive this morning on her way to radiology for a repeat CT chest.  BP improved when patient placed in Trendelenburg and given IVF.      Repeat ECG  (829 h) pos for deep T-wave inversion in V2-6 which was not present on the earlier ECG. Troponin 0.037 > 0.013.      CT chest today pos for interval increase in right pleural effusion/ hemothorax with no significant change in small pneumothorax.  Also, increasing  atelectasis of the right lower lobe obscuring the previously described hematoma/ contusion.  Also, mild atelectasis on the left.       Patient complained of left hip pain this morning.  X-ray pos for left superior pubic ramus fracture of uncertain chronicity possibly acute.  also,, old intertrochanteric left hip fracture s/p ORIF.  Severe bony demineralization and degenerative changes noted.     VS (12 18 h) /62, , RR 15, SpO2 100% (2 L).  Labs WBC 13.1 > 10.2, Hb 11.7 > 9.7, Na 131> 132, Cr 1.2 > 0.8     Transfer requested for higher level of care.  Transfer diagnosis multiple right-sided rib fractures, right pulmonary contusion, right apical pneumothorax (small), rt > lt pleural effusion, left pubic ramus fracture of uncertain chronicity, EKG changes concerning for ischemia      * No surgery found *      Hospital Course:   Alvina Guallpa is an with chronic thoracic, lumbar, left femur and pelvic fractures who presented with a mechanical fall leading to right sided 8th and 9th rib fractures with a hemopneumothorax.   She had a chest tube placed in the ED upon transfer from [facility]. Her Hgb fell to 7 as there was significant output from the chest tube once placed. She received one unit of FFP and one unit of PRBCs. Home Xarelto was held.   Hgb is being trended and has improved; output from the chest tube is also decreased.   Pulmonary consulted and later stable on room air    Daily serial CXRs show progressive improvement. Troponin is also being trended given mild elevation with TWIs at the lateral leads.   Cardiology reviewed as well and advised outpatient stress test .   Later patient sable and DC to SNF .         Goals of Care Treatment Preferences:  Code Status: Full Code      Consults:   Consults (From admission, onward)        Status Ordering Provider     Inpatient consult to Social Work/Case Management  Once        Provider:  (Not yet assigned)    Completed YOANA AGEE     Inpatient consult to  Cardiology  Once        Provider:  Félix Castro MD    Completed YOANA AGEE     IP consult to case management  Once        Provider:  (Not yet assigned)    Completed SNEHAL KELLEY     Pulmonology  Once        Provider:  (Not yet assigned)    Completed SNEHAL KELLEY          No new Assessment & Plan notes have been filed under this hospital service since the last note was generated.  Service: Hospital Medicine    Final Active Diagnoses:    Diagnosis Date Noted POA    PRINCIPAL PROBLEM:  Traumatic hemopneumothorax, initial encounter [S27.2XXA] 07/12/2023 Yes    Atrial fibrillation [I48.91] 07/14/2023 Yes    HLD (hyperlipidemia) [E78.5] 07/14/2023 Yes    Lumbar compression fracture [S32.000A] 07/14/2023 Yes    Right lower lobe lung mass [R91.8] 07/14/2023 Yes    Closed fracture of ramus of left pubis [S32.592A] 07/13/2023 Yes    Fall [W19.XXXA] 07/13/2023 Yes    Troponin level elevated [R77.8] 07/13/2023 Yes    Multiple closed fractures of ribs of right side [S22.41XA] 07/12/2023 Yes    Cardiac pacemaker in situ [Z95.0] 07/12/2023 Yes    Primary hypertension [I10] 07/12/2023 Yes    Acute posthemorrhagic anemia [D62] 07/12/2023 Yes    Contusion of lung, closed, initial encounter [S27.329A] 07/12/2023 Yes    Compression fracture of body of thoracic vertebra [S22.000A] 07/12/2023 Yes      Problems Resolved During this Admission:       Discharged Condition: good    Disposition: Skilled Nursing Facility    Follow Up:   Follow-up Information     Aziza Carrasco MD Follow up in 1 month(s).    Specialties: Pulmonary Disease, Sleep Medicine  Contact information:  [address]  [address]  [place] [id number] [phone]             Félix Castro MD Follow up in 1 month(s).    Specialties: Interventional Cardiology, Cardiology, Cardiovascular Disease  Contact information:  [address]  [address]  [place] [address]  [phone]                       Patient Instructions:      Diet  Cardiac     Activity as tolerated       Significant Diagnostic Studies: Labs: BMP: No results for input(s): GLU, NA, K, CL, CO2, BUN, CREATININE, CALCIUM, MG in the last 48 hours. and CMP No results for input(s): NA, K, CL, CO2, GLU, BUN, CREATININE, CALCIUM, PROT, ALBUMIN, BILITOT, ALKPHOS, AST, ALT, ANIONGAP, ESTGFRAFRICA, EGFRNONAA in the last 48 hours.    Pending Diagnostic Studies:     None         Medications:  Reconciled Home Medications:      Medication List      START taking these medications    metoprolol tartrate 25 MG tablet  Commonly known as: LOPRESSOR  Take 0.5 tablets (12.5 mg total) by mouth 2 (two) times daily.        CONTINUE taking these medications    aspirin 81 MG EC tablet  Commonly known as: ECOTRIN  Take 81 mg by mouth once daily.     atorvastatin 10 MG tablet  Commonly known as: LIPITOR  Take 10 mg by mouth once daily.     baclofen 10 MG tablet  Commonly known as: LIORESAL  Take 10 mg by mouth 2 (two) times daily. prn     budesonide 0.5 mg/2 mL nebulizer solution  Commonly known as: PULMICORT  Take 2 mLs (0.5 mg total) by nebulization 2 (two) times daily. For use in saline irrigation as directed.     celecoxib 100 MG capsule  Commonly known as: CeleBREX  Take 100 mg by mouth 2 (two) times daily.     CENTRUM SILVER ORAL  Take by mouth.     clobetasoL 0.05 % cream  Commonly known as: TEMOVATE  Apply topically every evening. X 2 weeks, then as needed.     cloNIDine 0.1 MG tablet  Commonly known as: CATAPRES  Take 0.1 mg by mouth once daily.     diltiaZEM HCl 120 mg Cp12  Take 1 capsule by mouth every 12 (twelve) hours.     estradioL 0.01 % (0.1 mg/gram) vaginal cream  Commonly known as: ESTRACE  Apply 1 gram vaginally daily for two weeks and then twice weekly     * HYDROcodone-acetaminophen 5-325 mg per tablet  Commonly known as: NORCO  Take 1 tablet by mouth every 6 (six) hours as needed for Pain.     * HYDROcodone-acetaminophen 7.5-325 mg per tablet  Commonly known as: NORCO  Take 1 tablet  by mouth 2 (two) times daily.     lisinopriL 10 MG tablet  Take 10 mg by mouth.     rivaroxaban 10 mg Tab  Commonly known as: XARELTO  Take 10 mg by mouth daily with dinner or evening meal.     traZODone 50 MG tablet  Commonly known as: DESYREL  Take 50 mg by mouth every evening.         * This list has 2 medication(s) that are the same as other medications prescribed for you. Read the directions carefully, and ask your doctor or other care provider to review them with you.                Indwelling Lines/Drains at time of discharge:   Lines/Drains/Airways     None               General: Patient resting comfortably in no acute distress. Appears as stated age. Calm  Eyes: EOM intact. No conjunctivae injection. No scleral icterus.  ENT: Hearing grossly intact. No discharge from ears. No nasal discharge.   CVS: RRR. No LE edema BL.  Lungs: CTA BL, no wheezing or crackles. Good breath sounds. No accessory muscle use. No acute respiratory distress  Neuro: non focal , Follows commands. Responds appropriately  Time spent on the discharge of patient: 22 minutes         Jeffry Browning MD  Department of Hospital Medicine  Formerly [facility]

## 2023-07-31 PROBLEM — E55.9 VITAMIN D DEFICIENCY: Status: ACTIVE | Noted: 2023-07-31

## 2023-08-06 PROBLEM — U07.1 COVID-19 VIRUS INFECTION: Status: ACTIVE | Noted: 2023-08-06

## 2023-08-11 PROBLEM — G93.41 ENCEPHALOPATHY, METABOLIC: Status: ACTIVE | Noted: 2023-08-11

## 2023-10-16 PROBLEM — G45.9 TRANSIENT ISCHEMIC ATTACK: Status: RESOLVED | Noted: 2023-07-14 | Resolved: 2023-10-16

## 2024-03-08 ENCOUNTER — HOSPITAL ENCOUNTER (EMERGENCY)
Facility: HOSPITAL | Age: 84
Discharge: HOME | End: 2024-03-08
Attending: EMERGENCY MEDICINE
Payer: MEDICARE

## 2024-03-08 VITALS
DIASTOLIC BLOOD PRESSURE: 116 MMHG | HEART RATE: 88 BPM | SYSTOLIC BLOOD PRESSURE: 142 MMHG | OXYGEN SATURATION: 99 % | RESPIRATION RATE: 18 BRPM | HEIGHT: 63 IN | WEIGHT: 100 LBS | BODY MASS INDEX: 17.72 KG/M2 | TEMPERATURE: 98 F

## 2024-03-08 DIAGNOSIS — Z76.0 MEDICATION REFILL: Primary | ICD-10-CM

## 2024-03-08 PROCEDURE — 25000003 PHARM REV CODE 250: Performed by: NURSE PRACTITIONER

## 2024-03-08 PROCEDURE — 63600175 PHARM REV CODE 636 W HCPCS: Performed by: NURSE PRACTITIONER

## 2024-03-08 PROCEDURE — 96372 THER/PROPH/DIAG INJ SC/IM: CPT | Performed by: NURSE PRACTITIONER

## 2024-03-08 PROCEDURE — 99284 EMERGENCY DEPT VISIT MOD MDM: CPT | Mod: 25

## 2024-03-08 RX ORDER — HYDROCODONE BITARTRATE AND ACETAMINOPHEN 7.5; 325 MG/1; MG/1
1 TABLET ORAL
Status: COMPLETED | OUTPATIENT
Start: 2024-03-08 | End: 2024-03-08

## 2024-03-08 RX ORDER — BACLOFEN 10 MG/1
TABLET ORAL
Qty: 90 TABLET | Refills: 2 | Status: SHIPPED | OUTPATIENT
Start: 2024-03-08

## 2024-03-08 RX ORDER — METHYLPREDNISOLONE SOD SUCC 125 MG
125 VIAL (EA) INJECTION
Status: COMPLETED | OUTPATIENT
Start: 2024-03-08 | End: 2024-03-08

## 2024-03-08 RX ORDER — KETOROLAC TROMETHAMINE 30 MG/ML
15 INJECTION, SOLUTION INTRAMUSCULAR; INTRAVENOUS
Status: COMPLETED | OUTPATIENT
Start: 2024-03-08 | End: 2024-03-08

## 2024-03-08 RX ADMIN — METHYLPREDNISOLONE SODIUM SUCCINATE 125 MG: 125 INJECTION, POWDER, FOR SOLUTION INTRAMUSCULAR; INTRAVENOUS at 05:03

## 2024-03-08 RX ADMIN — KETOROLAC TROMETHAMINE 15 MG: 30 INJECTION, SOLUTION INTRAMUSCULAR; INTRAVENOUS at 05:03

## 2024-03-08 RX ADMIN — HYDROCODONE BITARTRATE AND ACETAMINOPHEN 1 TABLET: 7.5; 325 TABLET ORAL at 05:03

## 2024-03-08 NOTE — ED TRIAGE NOTES
Patient presents to ED POV with c/o generalized body aches. Per her grandson, the pt takes norco 7.5 mg BID for chronic pain. Her prescription ran out and a refill was [facility] to [facility]. [facility] called and told her grandson that they have 5 mg and 10 mg but are out of 7.5 mg. He called her doctors office to have them send a different strength prescription, but they are closed and have not returned his call.The pts last dose of norco was taken yesterday at 3 pm. She is AAOx4. Skin warm, dry to touch. Respirations even, non labored. Ambulatory using a walker.

## 2024-03-09 NOTE — ED PROVIDER NOTES
Encounter Date: 3/8/2024       History     Chief Complaint   Patient presents with    Medication Refill     POV to ED with gregg.  Gregg is the primary historian.  States that the patient is prescribed Norco 7.5 for chronic pain.  She has been on medication for many years.  States the prescription was put in for refilled.  Pharmacy is out of [place] 7.5.  Pharmacy only has 5 mg and 10 mg tablets.  Gregg states that he has attempted to call the office all day.  Without any results.  Presents today for medication refill for Norco.  Her last dose was yesterday.  Her last prescription refill per  was 02/05/24.  History of chronic neck, chronic back.  She denies fall or injury, no other complaints    The history is provided by the patient and a relative.     Review of patient's allergies indicates:   Allergen Reactions    Penicillins Hives     Past Medical History:   Diagnosis Date    Anxiety     Atrial fibrillation 7/14/2023    Cardiac pacemaker in situ 7/12/2023    Chronic kidney disease, stage 3b 7/12/2023    Chronic neck pain     Depression     Hypertension     Osteoporosis     Seasonal allergies     TIA (transient ischemic attack)      Past Surgical History:   Procedure Laterality Date    HYSTERECTOMY      INSERTION OF PACEMAKER      NASAL POLYP SURGERY Bilateral     NECK SURGERY       Family History   Problem Relation Age of Onset    Cancer Mother     Stomach cancer Mother      Social History     Tobacco Use    Smoking status: Never    Smokeless tobacco: Never   Substance Use Topics    Alcohol use: No     Alcohol/week: 0.0 standard drinks of alcohol    Drug use: No     Review of Systems   Constitutional:  Negative for chills and fever.   Musculoskeletal:         Chronic neck and chronic back pain   All other systems reviewed and are negative.      Physical Exam     Initial Vitals [03/08/24 1715]   BP Pulse Resp Temp SpO2   (!) 190/121 90 18 97.6 °F (36.4 °C) 99 %      MAP       --         Physical  "Exam    Nursing note and vitals reviewed.  Constitutional: She appears well-developed and well-nourished. No distress.   HENT:   Head: Normocephalic and atraumatic.   Eyes: Pupils are equal, round, and reactive to light.   Neck:   Normal range of motion.  Cardiovascular:  Normal rate and regular rhythm.           Pulmonary/Chest: Breath sounds normal. No respiratory distress.   Abdominal: Abdomen is soft. There is no abdominal tenderness.   Musculoskeletal:      Cervical back: Normal range of motion.      Comments: Slow steady gait with use of walker in the ED.  Tolerates ambulation well.  Kyphosis.  Normal range of motion for patient.  Reporting pain all over her back and neck     Neurological: She is alert and oriented to person, place, and time. GCS score is 15. GCS eye subscore is 4. GCS verbal subscore is 5. GCS motor subscore is 6.   Skin: Skin is warm and dry. Capillary refill takes 2 to 3 seconds.   Psychiatric: She has a normal mood and affect. Thought content normal.         ED Course   Procedures  Labs Reviewed - No data to display       Imaging Results    None          Medications   HYDROcodone-acetaminophen 7.5-325 mg per tablet 1 tablet (1 tablet Oral Given 3/8/24 1755)   methylPREDNISolone sodium succinate injection 125 mg (125 mg Intramuscular Given 3/8/24 1756)   ketorolac injection 15 mg (15 mg Intramuscular Given 3/8/24 1756)     Medical Decision Making  Presents for medication refill, out [place], disposition pending  Differentials include not limited to medication refill, chronic pain.  Patient and grandson are advised of [facility] regulations.  That will not be able to fill a Norco prescription.  She will get 1 dose today.  Agreeable to Solu-Medrol IM and Toradol IM.  To follow up with clinic.  Return as needed. After discharge, gregg tells me that the pharmacy has told him there are "27" pills at the pharmacy that they will fill for the patient. They will  the partial prescription. No " additional concerns.    Amount and/or Complexity of Data Reviewed  Independent Historian: caregiver     Details: grandson    Risk  Prescription drug management.                                      Clinical Impression:  Final diagnoses:  [Z76.0] Medication refill - history of chronic back and neck pain (Primary)          ED Disposition Condition    Discharge Stable          ED Prescriptions       Medication Sig Dispense Start Date End Date Auth. Provider    baclofen (LIORESAL) 10 MG tablet One PO TID PRN back pain 90 tablet 3/8/2024 -- Mary Cast NP          Follow-up Information       Follow up With Specialties Details Why Contact Info    Darby Roper, [facility] Call in 3 days  [address] [place] [address] [phone]               Mary Cast NP  03/08/24 2354

## 2024-03-09 NOTE — DISCHARGE INSTRUCTIONS
Rest, increase fluids, lots of water and liquids.  Continue prescribed medications.  Call office for recheck.  Return as needed. Blood pressure recheck in one week

## 2024-04-15 NOTE — ASSESSMENT & PLAN NOTE
Improving.  -Chest tube placed by Dr. Baker in the ED  -Hold home anticoagulation  -Trend CBC  -Pulmonary consulted  -Serial CXRs     done